# Patient Record
Sex: FEMALE | Race: BLACK OR AFRICAN AMERICAN | NOT HISPANIC OR LATINO | Employment: OTHER | URBAN - METROPOLITAN AREA
[De-identification: names, ages, dates, MRNs, and addresses within clinical notes are randomized per-mention and may not be internally consistent; named-entity substitution may affect disease eponyms.]

---

## 2023-03-14 PROBLEM — I73.9 PAD (PERIPHERAL ARTERY DISEASE) (CMS-HCC): Status: ACTIVE | Noted: 2023-03-14

## 2023-03-14 PROBLEM — M54.50 LOWER BACK PAIN: Status: ACTIVE | Noted: 2023-03-14

## 2023-03-14 PROBLEM — N18.6 ESRD (END STAGE RENAL DISEASE) ON DIALYSIS (MULTI): Status: ACTIVE | Noted: 2023-03-14

## 2023-03-14 PROBLEM — E87.5 HYPERKALEMIA: Status: ACTIVE | Noted: 2023-03-14

## 2023-03-14 PROBLEM — E16.2 HYPOGLYCEMIA: Status: ACTIVE | Noted: 2023-03-14

## 2023-03-14 PROBLEM — R09.02 HYPOXIA: Status: ACTIVE | Noted: 2023-03-14

## 2023-03-14 PROBLEM — E87.1 HYPONATREMIA: Status: ACTIVE | Noted: 2023-03-14

## 2023-03-14 PROBLEM — I20.9 ANGINA PECTORIS (CMS-HCC): Status: ACTIVE | Noted: 2023-03-14

## 2023-03-14 PROBLEM — R63.4 WEIGHT LOSS: Status: ACTIVE | Noted: 2023-03-14

## 2023-03-14 PROBLEM — I10 HTN (HYPERTENSION): Status: ACTIVE | Noted: 2023-03-14

## 2023-03-14 PROBLEM — K57.90 DIVERTICULOSIS: Status: ACTIVE | Noted: 2023-03-14

## 2023-03-14 PROBLEM — R91.8 PULMONARY INFILTRATES ON CXR: Status: ACTIVE | Noted: 2023-03-14

## 2023-03-14 PROBLEM — I50.9: Status: ACTIVE | Noted: 2023-03-14

## 2023-03-14 PROBLEM — J44.9 COPD (CHRONIC OBSTRUCTIVE PULMONARY DISEASE) (MULTI): Status: ACTIVE | Noted: 2023-03-14

## 2023-03-14 PROBLEM — E87.70 VOLUME OVERLOAD: Status: ACTIVE | Noted: 2023-03-14

## 2023-03-14 PROBLEM — J18.9 PNA (PNEUMONIA): Status: ACTIVE | Noted: 2023-03-14

## 2023-03-14 PROBLEM — K22.10 EROSIVE ESOPHAGITIS: Status: ACTIVE | Noted: 2023-03-14

## 2023-03-14 PROBLEM — I42.9 CARDIOMYOPATHY (MULTI): Status: ACTIVE | Noted: 2023-03-14

## 2023-03-14 PROBLEM — N18.5 CHRONIC KIDNEY DISEASE, STAGE 5 (MULTI): Status: ACTIVE | Noted: 2023-03-14

## 2023-03-14 PROBLEM — Z99.2 ESRD (END STAGE RENAL DISEASE) ON DIALYSIS (MULTI): Status: ACTIVE | Noted: 2023-03-14

## 2023-03-14 PROBLEM — K29.00 ACUTE GASTRITIS: Status: ACTIVE | Noted: 2023-03-14

## 2023-03-14 PROBLEM — R53.81 PHYSICAL DEBILITY: Status: ACTIVE | Noted: 2023-03-14

## 2023-03-14 PROBLEM — E78.5 HYPERLIPIDEMIA: Status: ACTIVE | Noted: 2023-03-14

## 2023-03-14 PROBLEM — R63.0 POOR APPETITE: Status: ACTIVE | Noted: 2023-03-14

## 2023-03-14 PROBLEM — K59.09 CHRONIC CONSTIPATION: Status: ACTIVE | Noted: 2023-03-14

## 2023-03-14 PROBLEM — R06.02 SOB (SHORTNESS OF BREATH) ON EXERTION: Status: ACTIVE | Noted: 2023-03-14

## 2023-03-14 PROBLEM — E83.51 HYPOCALCEMIA: Status: ACTIVE | Noted: 2023-03-14

## 2023-03-14 PROBLEM — E83.42 HYPOMAGNESEMIA: Status: ACTIVE | Noted: 2023-03-14

## 2023-03-14 PROBLEM — R10.2 PELVIC PAIN IN FEMALE: Status: ACTIVE | Noted: 2023-03-14

## 2023-03-14 PROBLEM — M11.239 PSEUDOGOUT OF WRIST: Status: ACTIVE | Noted: 2023-03-14

## 2023-03-14 PROBLEM — R53.1 WEAKNESS: Status: ACTIVE | Noted: 2023-03-14

## 2023-03-14 RX ORDER — LANOLIN ALCOHOL/MO/W.PET/CERES
1 CREAM (GRAM) TOPICAL DAILY
COMMUNITY
Start: 2022-06-26

## 2023-03-14 RX ORDER — DOCUSATE SODIUM 100 MG/1
100 CAPSULE, LIQUID FILLED ORAL 2 TIMES DAILY
COMMUNITY
End: 2024-01-31

## 2023-03-14 RX ORDER — CARVEDILOL 12.5 MG/1
12.5 TABLET ORAL 2 TIMES DAILY
COMMUNITY
End: 2023-05-02 | Stop reason: SDUPTHER

## 2023-03-14 RX ORDER — ISOSORBIDE DINITRATE 30 MG/1
1 TABLET ORAL DAILY
COMMUNITY
Start: 2022-06-26 | End: 2023-07-03 | Stop reason: WASHOUT

## 2023-03-14 RX ORDER — IPRATROPIUM BROMIDE AND ALBUTEROL SULFATE 2.5; .5 MG/3ML; MG/3ML
3 SOLUTION RESPIRATORY (INHALATION) EVERY 4 HOURS PRN
Status: ON HOLD | COMMUNITY
End: 2024-01-31 | Stop reason: ENTERED-IN-ERROR

## 2023-03-14 RX ORDER — ASPIRIN 81 MG/1
81 TABLET ORAL DAILY
COMMUNITY

## 2023-03-14 RX ORDER — ISOSORBIDE MONONITRATE 30 MG/1
60 TABLET, EXTENDED RELEASE ORAL DAILY
COMMUNITY
End: 2023-07-03 | Stop reason: SDUPTHER

## 2023-03-14 RX ORDER — MIRTAZAPINE 7.5 MG/1
1 TABLET, FILM COATED ORAL NIGHTLY
COMMUNITY
Start: 2022-09-12 | End: 2023-05-24 | Stop reason: SDUPTHER

## 2023-03-14 RX ORDER — TALC
1 POWDER (GRAM) TOPICAL NIGHTLY PRN
COMMUNITY

## 2023-03-14 RX ORDER — SEVELAMER HYDROCHLORIDE 800 MG/1
800 TABLET, FILM COATED ORAL
COMMUNITY
End: 2023-08-15 | Stop reason: SDUPTHER

## 2023-03-14 RX ORDER — ATORVASTATIN CALCIUM 80 MG/1
1 TABLET, FILM COATED ORAL DAILY
COMMUNITY
Start: 2022-06-26 | End: 2023-04-03 | Stop reason: SDUPTHER

## 2023-03-14 RX ORDER — ACETAMINOPHEN 325 MG/1
1-2 TABLET ORAL EVERY 4 HOURS PRN
COMMUNITY
Start: 2022-06-26 | End: 2024-01-31

## 2023-03-14 RX ORDER — FUROSEMIDE 80 MG/1
1 TABLET ORAL 2 TIMES DAILY
COMMUNITY
Start: 2022-06-26 | End: 2023-07-03 | Stop reason: WASHOUT

## 2023-03-14 RX ORDER — AMLODIPINE BESYLATE 5 MG/1
5 TABLET ORAL DAILY
COMMUNITY
End: 2023-05-02 | Stop reason: SDUPTHER

## 2023-03-14 RX ORDER — GABAPENTIN 100 MG/1
1 CAPSULE ORAL NIGHTLY
COMMUNITY
Start: 2022-06-26 | End: 2023-05-02 | Stop reason: SDUPTHER

## 2023-03-16 ENCOUNTER — APPOINTMENT (OUTPATIENT)
Dept: PRIMARY CARE | Facility: CLINIC | Age: 83
End: 2023-03-16
Payer: COMMERCIAL

## 2023-04-03 ENCOUNTER — OFFICE VISIT (OUTPATIENT)
Dept: PRIMARY CARE | Facility: CLINIC | Age: 83
End: 2023-04-03
Payer: COMMERCIAL

## 2023-04-03 VITALS
SYSTOLIC BLOOD PRESSURE: 120 MMHG | BODY MASS INDEX: 17.79 KG/M2 | DIASTOLIC BLOOD PRESSURE: 70 MMHG | WEIGHT: 110.23 LBS | TEMPERATURE: 98 F | HEART RATE: 72 BPM | RESPIRATION RATE: 16 BRPM

## 2023-04-03 DIAGNOSIS — I50.9 CHF, STAGE C (MULTI): ICD-10-CM

## 2023-04-03 DIAGNOSIS — I73.9 PAD (PERIPHERAL ARTERY DISEASE) (CMS-HCC): ICD-10-CM

## 2023-04-03 DIAGNOSIS — M11.231 PSEUDOGOUT OF RIGHT WRIST: ICD-10-CM

## 2023-04-03 DIAGNOSIS — I10 PRIMARY HYPERTENSION: ICD-10-CM

## 2023-04-03 DIAGNOSIS — J44.9 CHRONIC OBSTRUCTIVE PULMONARY DISEASE, UNSPECIFIED COPD TYPE (MULTI): Primary | ICD-10-CM

## 2023-04-03 DIAGNOSIS — N18.6 ANEMIA DUE TO CHRONIC KIDNEY DISEASE, ON CHRONIC DIALYSIS (MULTI): ICD-10-CM

## 2023-04-03 DIAGNOSIS — N18.6 ESRD (END STAGE RENAL DISEASE) ON DIALYSIS (MULTI): ICD-10-CM

## 2023-04-03 DIAGNOSIS — Z99.2 ANEMIA DUE TO CHRONIC KIDNEY DISEASE, ON CHRONIC DIALYSIS (MULTI): ICD-10-CM

## 2023-04-03 DIAGNOSIS — E78.49 OTHER HYPERLIPIDEMIA: ICD-10-CM

## 2023-04-03 DIAGNOSIS — D63.1 ANEMIA DUE TO CHRONIC KIDNEY DISEASE, ON CHRONIC DIALYSIS (MULTI): ICD-10-CM

## 2023-04-03 DIAGNOSIS — Z99.2 ESRD (END STAGE RENAL DISEASE) ON DIALYSIS (MULTI): ICD-10-CM

## 2023-04-03 PROCEDURE — 1157F ADVNC CARE PLAN IN RCRD: CPT | Performed by: INTERNAL MEDICINE

## 2023-04-03 PROCEDURE — 1159F MED LIST DOCD IN RCRD: CPT | Performed by: INTERNAL MEDICINE

## 2023-04-03 PROCEDURE — 3078F DIAST BP <80 MM HG: CPT | Performed by: INTERNAL MEDICINE

## 2023-04-03 PROCEDURE — 1160F RVW MEDS BY RX/DR IN RCRD: CPT | Performed by: INTERNAL MEDICINE

## 2023-04-03 PROCEDURE — 1036F TOBACCO NON-USER: CPT | Performed by: INTERNAL MEDICINE

## 2023-04-03 PROCEDURE — 99495 TRANSJ CARE MGMT MOD F2F 14D: CPT | Performed by: INTERNAL MEDICINE

## 2023-04-03 PROCEDURE — 3074F SYST BP LT 130 MM HG: CPT | Performed by: INTERNAL MEDICINE

## 2023-04-03 RX ORDER — ATORVASTATIN CALCIUM 80 MG/1
80 TABLET, FILM COATED ORAL DAILY
Qty: 30 TABLET | Refills: 2 | Status: SHIPPED | OUTPATIENT
Start: 2023-04-03 | End: 2024-02-13 | Stop reason: SDUPTHER

## 2023-04-03 NOTE — PROGRESS NOTES
Alyce Dougherty is a 83 y.o. female   Patient with a past medical history of HTN,  HLD, ESRD on hemodialysis (T, Th, Sat), HFrEF,  PAD, GERD,  Anemia of Chronic Disease, history of Breast CA status postlumpectomy s/p displaced fractures of left superior and inferior pubic rami after a fall, Pseudogout, Osteoarthritis    Admit 3/19  Discharged 3/23  Was admitted with LGI Bleed  Received 3 units PRBC  Colonoscopy done was negative    Feels fine  No chest pain/  SOB/ dizziness  BM OK  Energy level ok  Appetite OK             Review of Systems     Constitutional: no fever, no chills, not feeling poorly, not feeling tired and no recent weight gain, no recent weight loss.   ENT: no earache, no hearing loss, no nosebleeds, no nasal discharge, no sore throat and no hoarseness.   Cardiovascular: the heart rate was not slow, the heart rate was not fast, no chest pain, no palpitations, no intermittent leg claudication and no lower extremity edema.   Respiratory: no cough, wheezing or shortness of breath at rest or exertion  Gastrointestinal: no abdominal pain, no constipation, no melena, no nausea, no diarrhea, no vomiting and no blood in stools.   Musculoskeletal: no arthralgias, no myalgias, no back pain, no joint swelling, no joint stiffness, no limb pain and no limb swelling.   Integumentary: no skin rashes, no skin lesions, no itching, no skin wound and no dry skin.   Neurological: no headache, no confusion, no numbness, no dizziness, no tingling and no fainting.   All other systems have been reviewed and are negative for complaint.       Vitals:    04/03/23 1147   BP: 120/70   Pulse: 72   Resp: 16   Temp: 36.7 °C (98 °F)        Physical Exam     Constitutional   General appearance: Alert and in no acute distress.     Pulmonary   Respiratory assessment: No respiratory distress, normal respiratory rhythm and effort.    Auscultation of Lungs: Clear bilateral breath sounds.   Cardiovascular   Auscultation of heart: Apical  pulse normal, heart rate and rhythm normal, normal S1 and S2, no murmurs and no pericardial rub.    Exam for edema: 1 +  edema.   Abdomen   Abdominal Exam: No bruits, normal bowel sounds, soft, non-tender, no abdominal mass palpated.    Liver and Spleen exam: No hepato-splenomegaly.   Musculoskeletal   Examination of gait: Normal.    Inspection of digits and nails: No clubbing or cyanosis of the fingernails.    Inspection/palpation of joints, bones and muscles: No joint swelling. Normal movement of all extremities.   Skin   Skin inspection: Normal skin color and pigmentation, normal skin turgor and no visible rash.   Neurologic   Cranial nerves: Nerves 2-12 were intact, no focal neuro defects.     Assessment/Plan          Patient with a past medical history of HTN,  HLD, ESRD on hemodialysis (T, Th, Sat), HFrEF,  PAD, GERD,  Anemia of Chronic Disease, history of Breast CA status postlumpectomy s/p displaced fractures of left superior and inferior pubic rami after a fall, Pseudogout, Osteoarthritis    # LGI Bleed  Normal colonoscopy  Likely diverticular  Check CBC    # HTN  Stable  Continue current medications    # HLD  Stable  Continue current medications    # ESRD  On HD    # HFeEF  # Pedal edema  stable

## 2023-05-02 ENCOUNTER — TELEPHONE (OUTPATIENT)
Dept: PRIMARY CARE | Facility: CLINIC | Age: 83
End: 2023-05-02
Payer: COMMERCIAL

## 2023-05-02 DIAGNOSIS — I10 PRIMARY HYPERTENSION: Primary | ICD-10-CM

## 2023-05-02 DIAGNOSIS — G89.29 CHRONIC LOW BACK PAIN WITH SCIATICA, SCIATICA LATERALITY UNSPECIFIED, UNSPECIFIED BACK PAIN LATERALITY: ICD-10-CM

## 2023-05-02 DIAGNOSIS — M54.40 CHRONIC LOW BACK PAIN WITH SCIATICA, SCIATICA LATERALITY UNSPECIFIED, UNSPECIFIED BACK PAIN LATERALITY: ICD-10-CM

## 2023-05-02 RX ORDER — FUROSEMIDE 40 MG/1
40 TABLET ORAL DAILY
Qty: 60 TABLET | Refills: 2 | Status: SHIPPED | OUTPATIENT
Start: 2023-05-02 | End: 2023-07-03 | Stop reason: SDUPTHER

## 2023-05-02 RX ORDER — FUROSEMIDE 40 MG/1
TABLET ORAL 2 TIMES DAILY
COMMUNITY
Start: 2021-12-23 | End: 2023-05-02 | Stop reason: SDUPTHER

## 2023-05-02 RX ORDER — CARVEDILOL 12.5 MG/1
12.5 TABLET ORAL 2 TIMES DAILY
Qty: 120 TABLET | Refills: 2 | Status: SHIPPED | OUTPATIENT
Start: 2023-05-02 | End: 2023-06-27 | Stop reason: SDUPTHER

## 2023-05-02 RX ORDER — AMLODIPINE BESYLATE 5 MG/1
5 TABLET ORAL DAILY
Qty: 60 TABLET | Refills: 2 | Status: SHIPPED | OUTPATIENT
Start: 2023-05-02 | End: 2023-08-15 | Stop reason: SDUPTHER

## 2023-05-02 RX ORDER — GABAPENTIN 100 MG/1
100 CAPSULE ORAL NIGHTLY
Qty: 60 CAPSULE | Refills: 2 | Status: SHIPPED | OUTPATIENT
Start: 2023-05-02 | End: 2023-11-17 | Stop reason: SDUPTHER

## 2023-05-02 NOTE — TELEPHONE ENCOUNTER
Rx Refill Request Telephone Encounter    Name:  Alyce Dougherty  :  [id number]  Medication Name: Gabapentin 100MG Furosemide 40MG Carvedilol 12.5MG Amlodpine 5MG  Specific Pharmacy location:  [facility]   Date of last appointment:  4/3/2023  Date of next appointment:  2023

## 2023-05-02 NOTE — TELEPHONE ENCOUNTER
Patient son walked into office to state patient needs refills on these medications:    Gabapentin 100MG  Furosemide 40MG  Carvedilol 12.5MG  Amlodpine 5MG    [facility] [phone]

## 2023-05-11 ENCOUNTER — TELEPHONE (OUTPATIENT)
Dept: PRIMARY CARE | Facility: CLINIC | Age: 83
End: 2023-05-11
Payer: COMMERCIAL

## 2023-05-11 NOTE — TELEPHONE ENCOUNTER
Macy from visiting nurse wants to know if you received paper work for Dr. Corado to sign.   # [phone]

## 2023-05-18 ENCOUNTER — PATIENT OUTREACH (OUTPATIENT)
Dept: CARE COORDINATION | Facility: CLINIC | Age: 83
End: 2023-05-18
Payer: COMMERCIAL

## 2023-05-18 NOTE — PROGRESS NOTES
Discharge Facility:[facility]  Discharge Diagnosis: Hyperkalemia  Admission Date:23  Discharge Date: 23    PCP Appointment Date:  Specialist Appointment Date: Follow up with GI Appt will be made by Patient   Hospital Encounter and Summary: Linked   See discharge assessment below for further details  Engagement  Call Start Time: 0120 (2023  1:23 PM)    Medications  Medications reviewed with patient/caregiver?: Yes (no new meds) (2023  1:23 PM)  Is the patient having any side effects they believe may be caused by any medication additions or changes?: No (2023  1:23 PM)  Does the patient have all medications ordered at discharge?: Not applicable (2023  1:23 PM)  Is the patient taking all medications as directed (includes completed medication regime)?: Yes (2023  1:23 PM)    Appointments  Does the patient have a primary care provider?: Yes (2023  1:23 PM)  Care Management Interventions: Advised patient to make appointment (2023  1:23 PM)    Self Management  Has home health visited the patient within 72 hours of discharge?: Not applicable (2023  1:23 PM)  Has all Durable Medical Equipment (DME) been delivered?: No (2023  1:23 PM)    Patient Teaching  Does the patient have access to their discharge instructions?: No (2023  1:23 PM)  What is the patient's perception of their health status since discharge?: Improving (patient stated that she on ly had a few min to talk she was onher way to a [employer] .) (2023  1:23 PM)    Wrap Up  Call End Time: 0130 (2023  1:23 PM)

## 2023-05-24 DIAGNOSIS — R63.0 POOR APPETITE: ICD-10-CM

## 2023-05-24 RX ORDER — MIRTAZAPINE 7.5 MG/1
7.5 TABLET, FILM COATED ORAL NIGHTLY
Qty: 90 TABLET | Refills: 0 | Status: SHIPPED | OUTPATIENT
Start: 2023-05-24 | End: 2023-07-03 | Stop reason: SDUPTHER

## 2023-05-24 NOTE — TELEPHONE ENCOUNTER
A nurse from her nursing facility said she is only going to have physical therapy one time this week due to conflict with her other appointments.

## 2023-06-06 ENCOUNTER — PATIENT OUTREACH (OUTPATIENT)
Dept: CARE COORDINATION | Facility: CLINIC | Age: 83
End: 2023-06-06
Payer: COMMERCIAL

## 2023-06-06 NOTE — PROGRESS NOTES
Unable to reach patient for call back after patient's follow up appointment with PCP.  Patient had a no show for her follow up on 06/05/23.  [facility] with call back number for patient to call if needed   If no voicemail available call attempts x 2 were made to contact the patient to assist with any questions or concerns patient may have.

## 2023-06-12 ENCOUNTER — PATIENT OUTREACH (OUTPATIENT)
Dept: CARE COORDINATION | Facility: CLINIC | Age: 83
End: 2023-06-12
Payer: COMMERCIAL

## 2023-06-12 NOTE — PROGRESS NOTES
Discharge Facility:[facility]  Discharge Diagnosis:GI Bleed  Admission Date:06/07/23  Discharge Date: 06/09/23    PCP Appointment Date:  Specialist Appointment Date:   Hospital Encounter and Summary: Linked   See discharge assessment below for further details

## 2023-06-23 ENCOUNTER — TELEPHONE (OUTPATIENT)
Dept: PRIMARY CARE | Facility: CLINIC | Age: 83
End: 2023-06-23
Payer: COMMERCIAL

## 2023-06-27 DIAGNOSIS — I10 PRIMARY HYPERTENSION: ICD-10-CM

## 2023-06-27 RX ORDER — CARVEDILOL 12.5 MG/1
12.5 TABLET ORAL 2 TIMES DAILY
Qty: 120 TABLET | Refills: 2 | Status: SHIPPED | OUTPATIENT
Start: 2023-06-27 | End: 2024-02-05 | Stop reason: HOSPADM

## 2023-07-03 ENCOUNTER — OFFICE VISIT (OUTPATIENT)
Dept: PRIMARY CARE | Facility: CLINIC | Age: 83
End: 2023-07-03
Payer: COMMERCIAL

## 2023-07-03 VITALS
DIASTOLIC BLOOD PRESSURE: 62 MMHG | SYSTOLIC BLOOD PRESSURE: 140 MMHG | HEART RATE: 62 BPM | WEIGHT: 116 LBS | OXYGEN SATURATION: 96 % | RESPIRATION RATE: 16 BRPM | BODY MASS INDEX: 19.3 KG/M2

## 2023-07-03 DIAGNOSIS — K62.5 RECTAL BLEED: Primary | ICD-10-CM

## 2023-07-03 DIAGNOSIS — I73.9 PAD (PERIPHERAL ARTERY DISEASE) (CMS-HCC): ICD-10-CM

## 2023-07-03 DIAGNOSIS — R63.0 POOR APPETITE: ICD-10-CM

## 2023-07-03 DIAGNOSIS — I10 PRIMARY HYPERTENSION: ICD-10-CM

## 2023-07-03 DIAGNOSIS — I50.9 CHF, STAGE C (MULTI): ICD-10-CM

## 2023-07-03 PROCEDURE — 3078F DIAST BP <80 MM HG: CPT

## 2023-07-03 PROCEDURE — 1036F TOBACCO NON-USER: CPT

## 2023-07-03 PROCEDURE — 1160F RVW MEDS BY RX/DR IN RCRD: CPT

## 2023-07-03 PROCEDURE — 3077F SYST BP >= 140 MM HG: CPT

## 2023-07-03 PROCEDURE — 1157F ADVNC CARE PLAN IN RCRD: CPT

## 2023-07-03 PROCEDURE — 1159F MED LIST DOCD IN RCRD: CPT

## 2023-07-03 PROCEDURE — 99214 OFFICE O/P EST MOD 30 MIN: CPT

## 2023-07-03 RX ORDER — FUROSEMIDE 40 MG/1
40 TABLET ORAL 2 TIMES DAILY
Qty: 60 TABLET | Refills: 0 | Status: SHIPPED | OUTPATIENT
Start: 2023-07-03 | End: 2023-08-03 | Stop reason: SDUPTHER

## 2023-07-03 RX ORDER — PANTOPRAZOLE SODIUM 40 MG/1
40 TABLET, DELAYED RELEASE ORAL
COMMUNITY
Start: 2021-03-09 | End: 2024-02-04 | Stop reason: ENTERED-IN-ERROR

## 2023-07-03 RX ORDER — MIRTAZAPINE 7.5 MG/1
7.5 TABLET, FILM COATED ORAL NIGHTLY
Qty: 90 TABLET | Refills: 0 | Status: SHIPPED | OUTPATIENT
Start: 2023-07-03 | End: 2023-12-13 | Stop reason: SDUPTHER

## 2023-07-03 RX ORDER — ISOSORBIDE MONONITRATE 30 MG/1
60 TABLET, EXTENDED RELEASE ORAL DAILY
Qty: 30 TABLET | Refills: 0 | Status: SHIPPED | OUTPATIENT
Start: 2023-07-03 | End: 2024-02-13 | Stop reason: SDUPTHER

## 2023-07-03 ASSESSMENT — ENCOUNTER SYMPTOMS
POLYPHAGIA: 0
DYSURIA: 0
PALPITATIONS: 0
CONSTIPATION: 0
CONFUSION: 0
SLEEP DISTURBANCE: 0
EYE DISCHARGE: 0
BRUISES/BLEEDS EASILY: 0
MYALGIAS: 0
HEADACHES: 0
UNEXPECTED WEIGHT CHANGE: 0
FREQUENCY: 0
RESPIRATORY NEGATIVE: 1
COUGH: 0
ACTIVITY CHANGE: 0
NUMBNESS: 0
DIAPHORESIS: 0
PSYCHIATRIC NEGATIVE: 1
EYES NEGATIVE: 1
HYPERACTIVE: 0
APPETITE CHANGE: 0
FLANK PAIN: 0
TROUBLE SWALLOWING: 0
BACK PAIN: 0
FATIGUE: 0
VOICE CHANGE: 0
CHEST TIGHTNESS: 0
WHEEZING: 0
NAUSEA: 0
STRIDOR: 0
BLOOD IN STOOL: 0
SORE THROAT: 0
SHORTNESS OF BREATH: 0
DIARRHEA: 0
RECTAL PAIN: 0
LIGHT-HEADEDNESS: 0
DYSPHORIC MOOD: 0
NEUROLOGICAL NEGATIVE: 1
NECK PAIN: 0
ABDOMINAL DISTENTION: 0
CHILLS: 0
ANAL BLEEDING: 0
DIZZINESS: 0
TREMORS: 0
SPEECH DIFFICULTY: 0
SEIZURES: 0
HEMATURIA: 0
PHOTOPHOBIA: 0
NERVOUS/ANXIOUS: 0
RHINORRHEA: 0
GASTROINTESTINAL NEGATIVE: 1
APNEA: 0
COLOR CHANGE: 0
DIFFICULTY URINATING: 0
WEAKNESS: 0
CONSTITUTIONAL NEGATIVE: 1
CARDIOVASCULAR NEGATIVE: 1
ABDOMINAL PAIN: 0
JOINT SWELLING: 0
SINUS PRESSURE: 0
MUSCULOSKELETAL NEGATIVE: 1
AGITATION: 0
ENDOCRINE NEGATIVE: 1
HEMATOLOGIC/LYMPHATIC NEGATIVE: 1
NECK STIFFNESS: 0
FEVER: 0
POLYDIPSIA: 0

## 2023-07-03 NOTE — PROGRESS NOTES
Primary Care Provider: Chanelle Corado MD    Subjective   Alyce Dougherty is a 83 y.o. female who presents for Follow-up (Hospital discharge.).    Patient with a past medical history of HTN, HLD, ESRD on hemodialysis (T, Th, Sat), HFrEF, PAD, GERD, Anemia of Chronic Disease, history of Breast CA status postlumpectomy s/p displaced fractures of left superior and inferior pubic rami after a fall, Pseudogout, Osteoarthritis    First week of June hospitalized with Acute blood loss anemia. GI bleed; diverticular likely. Received a few units of blood.  Planned for outpatient GI follow-up.  She has an appointment 7/7 at 10am.    Feels fine  No chest pain/  SOB/ dizziness  BM OK  Energy level ok  Appetite OK           Review of Systems   Constitutional: Negative.  Negative for activity change, appetite change, chills, diaphoresis, fatigue, fever and unexpected weight change.   HENT: Negative.  Negative for congestion, dental problem, ear discharge, ear pain, hearing loss, mouth sores, nosebleeds, postnasal drip, rhinorrhea, sinus pressure, sneezing, sore throat, tinnitus, trouble swallowing and voice change.    Eyes: Negative.  Negative for photophobia, discharge and visual disturbance.   Respiratory: Negative.  Negative for apnea, cough, chest tightness, shortness of breath, wheezing and stridor.    Cardiovascular: Negative.  Negative for chest pain, palpitations and leg swelling.   Gastrointestinal: Negative.  Negative for abdominal distention, abdominal pain, anal bleeding, blood in stool, constipation, diarrhea, nausea and rectal pain.   Endocrine: Negative.  Negative for cold intolerance, heat intolerance, polydipsia, polyphagia and polyuria.   Genitourinary: Negative.  Negative for decreased urine volume, difficulty urinating, dysuria, flank pain, frequency, hematuria and urgency.   Musculoskeletal: Negative.  Negative for back pain, gait problem, joint swelling, myalgias, neck pain and neck stiffness.   Skin:  Negative.  Negative for color change and rash.   Neurological: Negative.  Negative for dizziness, tremors, seizures, syncope, speech difficulty, weakness, light-headedness, numbness and headaches.   Hematological: Negative.  Does not bruise/bleed easily.   Psychiatric/Behavioral: Negative.  Negative for agitation, confusion, dysphoric mood, sleep disturbance and suicidal ideas. The patient is not nervous/anxious and is not hyperactive.    All other systems reviewed and are negative.        Objective   /62   Pulse 62   Resp 16   Wt 52.6 kg (116 lb)   SpO2 96%   BMI 19.30 kg/m²     Physical Exam  Vitals reviewed.   Constitutional:       General: She is not in acute distress.     Appearance: Normal appearance. She is normal weight. She is not ill-appearing, toxic-appearing or diaphoretic.   HENT:      Head: Normocephalic and atraumatic.      Nose: Nose normal.   Eyes:      Extraocular Movements: Extraocular movements intact.      Conjunctiva/sclera: Conjunctivae normal.      Pupils: Pupils are equal, round, and reactive to light.   Neck:      Vascular: No carotid bruit.   Cardiovascular:      Rate and Rhythm: Normal rate and regular rhythm.      Pulses: Normal pulses.      Heart sounds: Normal heart sounds. No murmur heard.     No friction rub. No gallop.   Pulmonary:      Effort: Pulmonary effort is normal. No respiratory distress.      Breath sounds: Normal breath sounds.   Abdominal:      General: Abdomen is flat. Bowel sounds are normal.      Palpations: Abdomen is soft.   Musculoskeletal:         General: Normal range of motion.      Cervical back: Normal range of motion and neck supple.   Lymphadenopathy:      Cervical: No cervical adenopathy.   Skin:     General: Skin is warm and dry.      Capillary Refill: Capillary refill takes less than 2 seconds.   Neurological:      General: No focal deficit present.      Mental Status: She is alert and oriented to person, place, and time. Mental status is at  baseline.   Psychiatric:         Mood and Affect: Mood normal.         Behavior: Behavior normal.         Thought Content: Thought content normal.         Judgment: Judgment normal.         Assessment/Plan   Problem List Items Addressed This Visit       CHF, stage C (CMS/HCC)    Relevant Medications    isosorbide mononitrate ER (Imdur) 30 mg 24 hr tablet    furosemide (Lasix) 40 mg tablet    Other Relevant Orders    CBC    Comprehensive metabolic panel    HTN (hypertension)    Relevant Medications    furosemide (Lasix) 40 mg tablet    Other Relevant Orders    CBC    Comprehensive metabolic panel    PAD (peripheral artery disease) (CMS/HCC)    Relevant Orders    Referral to Vascular Medicine    Poor appetite    Relevant Medications    mirtazapine (Remeron) 7.5 mg tablet     Other Visit Diagnoses       Rectal bleed    -  Primary    Relevant Orders    CBC    Comprehensive metabolic panel        Follow up in 3 months or sooner as needed

## 2023-07-10 ENCOUNTER — LAB (OUTPATIENT)
Dept: LAB | Facility: LAB | Age: 83
End: 2023-07-10
Payer: COMMERCIAL

## 2023-07-10 DIAGNOSIS — I10 PRIMARY HYPERTENSION: ICD-10-CM

## 2023-07-10 DIAGNOSIS — I50.9 CHF, STAGE C (MULTI): ICD-10-CM

## 2023-07-10 DIAGNOSIS — K62.5 RECTAL BLEED: ICD-10-CM

## 2023-07-10 LAB
ERYTHROCYTE DISTRIBUTION WIDTH (RATIO) BY AUTOMATED COUNT: 17.5 % (ref 11.5–14.5)
ERYTHROCYTE MEAN CORPUSCULAR HEMOGLOBIN CONCENTRATION (G/DL) BY AUTOMATED: 30 G/DL (ref 32–36)
ERYTHROCYTE MEAN CORPUSCULAR VOLUME (FL) BY AUTOMATED COUNT: 98 FL (ref 80–100)
ERYTHROCYTES (10*6/UL) IN BLOOD BY AUTOMATED COUNT: 3.35 X10E12/L (ref 4–5.2)
HEMATOCRIT (%) IN BLOOD BY AUTOMATED COUNT: 32.7 % (ref 36–46)
HEMOGLOBIN (G/DL) IN BLOOD: 9.8 G/DL (ref 12–16)
LEUKOCYTES (10*3/UL) IN BLOOD BY AUTOMATED COUNT: 3.8 X10E9/L (ref 4.4–11.3)
PLATELETS (10*3/UL) IN BLOOD AUTOMATED COUNT: 133 X10E9/L (ref 150–450)

## 2023-07-10 PROCEDURE — 85027 COMPLETE CBC AUTOMATED: CPT

## 2023-07-10 PROCEDURE — 36415 COLL VENOUS BLD VENIPUNCTURE: CPT

## 2023-07-10 PROCEDURE — 80053 COMPREHEN METABOLIC PANEL: CPT

## 2023-07-11 LAB
ALANINE AMINOTRANSFERASE (SGPT) (U/L) IN SER/PLAS: 21 U/L (ref 7–45)
ALBUMIN (G/DL) IN SER/PLAS: 3.5 G/DL (ref 3.4–5)
ALKALINE PHOSPHATASE (U/L) IN SER/PLAS: 232 U/L (ref 33–136)
ANION GAP IN SER/PLAS: 17 MMOL/L (ref 10–20)
ASPARTATE AMINOTRANSFERASE (SGOT) (U/L) IN SER/PLAS: 26 U/L (ref 9–39)
BILIRUBIN TOTAL (MG/DL) IN SER/PLAS: 0.5 MG/DL (ref 0–1.2)
CALCIUM (MG/DL) IN SER/PLAS: 9.6 MG/DL (ref 8.6–10.6)
CARBON DIOXIDE, TOTAL (MMOL/L) IN SER/PLAS: 26 MMOL/L (ref 21–32)
CHLORIDE (MMOL/L) IN SER/PLAS: 98 MMOL/L (ref 98–107)
CREATININE (MG/DL) IN SER/PLAS: 6.4 MG/DL (ref 0.5–1.05)
GFR FEMALE: 6 ML/MIN/1.73M2
GLUCOSE (MG/DL) IN SER/PLAS: 109 MG/DL (ref 74–99)
POTASSIUM (MMOL/L) IN SER/PLAS: 5.1 MMOL/L (ref 3.5–5.3)
PROTEIN TOTAL: 6.8 G/DL (ref 6.4–8.2)
SODIUM (MMOL/L) IN SER/PLAS: 136 MMOL/L (ref 136–145)
UREA NITROGEN (MG/DL) IN SER/PLAS: 38 MG/DL (ref 6–23)

## 2023-08-03 DIAGNOSIS — I10 PRIMARY HYPERTENSION: ICD-10-CM

## 2023-08-03 DIAGNOSIS — I50.9 CHF, STAGE C (MULTI): ICD-10-CM

## 2023-08-04 RX ORDER — FUROSEMIDE 40 MG/1
40 TABLET ORAL 2 TIMES DAILY
Qty: 60 TABLET | Refills: 3 | Status: SHIPPED | OUTPATIENT
Start: 2023-08-04 | End: 2024-02-05 | Stop reason: HOSPADM

## 2023-08-15 DIAGNOSIS — I10 PRIMARY HYPERTENSION: ICD-10-CM

## 2023-08-15 DIAGNOSIS — Z99.2 ESRD (END STAGE RENAL DISEASE) ON DIALYSIS (MULTI): Primary | ICD-10-CM

## 2023-08-15 DIAGNOSIS — N18.6 ESRD (END STAGE RENAL DISEASE) ON DIALYSIS (MULTI): Primary | ICD-10-CM

## 2023-08-15 RX ORDER — AMLODIPINE BESYLATE 5 MG/1
5 TABLET ORAL DAILY
Qty: 60 TABLET | Refills: 2 | Status: SHIPPED | OUTPATIENT
Start: 2023-08-15 | End: 2024-02-05 | Stop reason: HOSPADM

## 2023-08-15 RX ORDER — SEVELAMER HYDROCHLORIDE 800 MG/1
800 TABLET, FILM COATED ORAL DAILY
Qty: 30 TABLET | Refills: 3 | Status: SHIPPED | OUTPATIENT
Start: 2023-08-15

## 2023-08-15 NOTE — TELEPHONE ENCOUNTER
PT son called and stated that his mother needs a script refill for different medications.    RX: Sevelamer 800 mg tablet    RX: Amlodipine 5 mg tablet

## 2023-08-22 ENCOUNTER — PATIENT OUTREACH (OUTPATIENT)
Dept: CARE COORDINATION | Facility: CLINIC | Age: 83
End: 2023-08-22
Payer: COMMERCIAL

## 2023-08-23 ENCOUNTER — TELEPHONE (OUTPATIENT)
Dept: PRIMARY CARE | Facility: CLINIC | Age: 83
End: 2023-08-23

## 2023-08-23 NOTE — TELEPHONE ENCOUNTER
PT son called and stated that she needs a new order [facility] to come.    PT needs to get more lidocaine from [facility].

## 2023-08-24 DIAGNOSIS — M54.50 CHRONIC LOW BACK PAIN, UNSPECIFIED BACK PAIN LATERALITY, UNSPECIFIED WHETHER SCIATICA PRESENT: Primary | ICD-10-CM

## 2023-08-24 DIAGNOSIS — G89.29 CHRONIC LOW BACK PAIN, UNSPECIFIED BACK PAIN LATERALITY, UNSPECIFIED WHETHER SCIATICA PRESENT: Primary | ICD-10-CM

## 2023-08-24 RX ORDER — LIDOCAINE 50 MG/G
1 PATCH TOPICAL DAILY
Qty: 30 PATCH | Refills: 11 | Status: SHIPPED | OUTPATIENT
Start: 2023-08-24 | End: 2024-02-05 | Stop reason: HOSPADM

## 2023-09-20 ENCOUNTER — PATIENT OUTREACH (OUTPATIENT)
Dept: CARE COORDINATION | Facility: CLINIC | Age: 83
End: 2023-09-20
Payer: COMMERCIAL

## 2023-09-20 NOTE — PROGRESS NOTES
Patient has met target of no readmission for 90) days post (hospital  discharge and is graduated from Transitional Care Management program at this time.

## 2023-10-06 ENCOUNTER — HOME HEALTH ADMISSION (OUTPATIENT)
Dept: HOME HEALTH SERVICES | Facility: HOME HEALTH | Age: 83
End: 2023-10-06
Payer: MEDICARE

## 2023-10-06 ENCOUNTER — OFFICE VISIT (OUTPATIENT)
Dept: PRIMARY CARE | Facility: CLINIC | Age: 83
End: 2023-10-06
Payer: COMMERCIAL

## 2023-10-06 VITALS
BODY MASS INDEX: 19.8 KG/M2 | RESPIRATION RATE: 18 BRPM | SYSTOLIC BLOOD PRESSURE: 130 MMHG | DIASTOLIC BLOOD PRESSURE: 70 MMHG | HEART RATE: 68 BPM | WEIGHT: 119 LBS

## 2023-10-06 DIAGNOSIS — E78.49 OTHER HYPERLIPIDEMIA: ICD-10-CM

## 2023-10-06 DIAGNOSIS — J44.9 CHRONIC OBSTRUCTIVE PULMONARY DISEASE, UNSPECIFIED COPD TYPE (MULTI): ICD-10-CM

## 2023-10-06 DIAGNOSIS — Z99.2 ESRD (END STAGE RENAL DISEASE) ON DIALYSIS (MULTI): ICD-10-CM

## 2023-10-06 DIAGNOSIS — Z00.00 HEALTHCARE MAINTENANCE: ICD-10-CM

## 2023-10-06 DIAGNOSIS — I10 PRIMARY HYPERTENSION: Primary | ICD-10-CM

## 2023-10-06 DIAGNOSIS — N18.6 ESRD (END STAGE RENAL DISEASE) ON DIALYSIS (MULTI): ICD-10-CM

## 2023-10-06 PROCEDURE — 1160F RVW MEDS BY RX/DR IN RCRD: CPT | Performed by: INTERNAL MEDICINE

## 2023-10-06 PROCEDURE — 90471 IMMUNIZATION ADMIN: CPT | Performed by: INTERNAL MEDICINE

## 2023-10-06 PROCEDURE — 99214 OFFICE O/P EST MOD 30 MIN: CPT | Performed by: INTERNAL MEDICINE

## 2023-10-06 PROCEDURE — 3075F SYST BP GE 130 - 139MM HG: CPT | Performed by: INTERNAL MEDICINE

## 2023-10-06 PROCEDURE — 1126F AMNT PAIN NOTED NONE PRSNT: CPT | Performed by: INTERNAL MEDICINE

## 2023-10-06 PROCEDURE — 1159F MED LIST DOCD IN RCRD: CPT | Performed by: INTERNAL MEDICINE

## 2023-10-06 PROCEDURE — 3078F DIAST BP <80 MM HG: CPT | Performed by: INTERNAL MEDICINE

## 2023-10-06 PROCEDURE — 1036F TOBACCO NON-USER: CPT | Performed by: INTERNAL MEDICINE

## 2023-10-06 PROCEDURE — 90662 IIV NO PRSV INCREASED AG IM: CPT | Performed by: INTERNAL MEDICINE

## 2023-10-06 RX ORDER — TIOTROPIUM BROMIDE 18 UG/1
1 CAPSULE ORAL; RESPIRATORY (INHALATION) NIGHTLY
Qty: 30 CAPSULE | Refills: 11 | Status: SHIPPED | OUTPATIENT
Start: 2023-10-06 | End: 2024-05-20 | Stop reason: SDUPTHER

## 2023-10-06 RX ORDER — BENZONATATE 100 MG/1
100 CAPSULE ORAL 3 TIMES DAILY PRN
Qty: 42 CAPSULE | Refills: 0 | Status: SHIPPED | OUTPATIENT
Start: 2023-10-06 | End: 2023-11-05

## 2023-10-06 NOTE — PROGRESS NOTES
Alyce Dougherty is a 83 y.o. female   Patient with a past medical history of HTN,  HLD, ESRD on hemodialysis (T, Th, Sat), HFrEF,  PAD, GERD,  Anemia of Chronic Disease, history of Breast CA status postlumpectomy s/p displaced fractures of left superior and inferior pubic rami after a fall, Pseudogout, Osteoarthritis    C/o morning phlegm  Every morning, clears up bey afternoon  No SOB  No Sinus congestion    Feels fine  No chest pain/  SOB/ dizziness  BM OK  Energy level ok  Appetite OK             Review of Systems     Constitutional: no fever, no chills, not feeling poorly, not feeling tired and no recent weight gain, no recent weight loss.   ENT: no earache, no hearing loss, no nosebleeds, no nasal discharge, no sore throat and no hoarseness.   Cardiovascular: the heart rate was not slow, the heart rate was not fast, no chest pain, no palpitations, no intermittent leg claudication and no lower extremity edema.   Respiratory: no cough, wheezing or shortness of breath at rest or exertion  Gastrointestinal: no abdominal pain, no constipation, no melena, no nausea, no diarrhea, no vomiting and no blood in stools.   Musculoskeletal: no arthralgias, no myalgias, no back pain, no joint swelling, no joint stiffness, no limb pain and no limb swelling.   Integumentary: no skin rashes, no skin lesions, no itching, no skin wound and no dry skin.   Neurological: no headache, no confusion, no numbness, no dizziness, no tingling and no fainting.   All other systems have been reviewed and are negative for complaint.       There were no vitals filed for this visit.       Physical Exam     Constitutional   General appearance: Alert and in no acute distress.     Pulmonary   Respiratory assessment: No respiratory distress, normal respiratory rhythm and effort.    Auscultation of Lungs: Clear bilateral breath sounds.   Cardiovascular   Auscultation of heart: Apical pulse normal, heart rate and rhythm normal, normal S1 and S2, no  murmurs and no pericardial rub.    Exam for edema: 1 +  edema.   Abdomen   Abdominal Exam: No bruits, normal bowel sounds, soft, non-tender, no abdominal mass palpated.    Liver and Spleen exam: No hepato-splenomegaly.   Musculoskeletal   Examination of gait: Normal.    Inspection of digits and nails: No clubbing or cyanosis of the fingernails.    Inspection/palpation of joints, bones and muscles: No joint swelling. Normal movement of all extremities.   Skin   Skin inspection: Normal skin color and pigmentation, normal skin turgor and no visible rash.   Neurologic   Cranial nerves: Nerves 2-12 were intact, no focal neuro defects.     Assessment/Plan          Patient with a past medical history of HTN,  HLD, ESRD on hemodialysis (T, Th, Sat), HFrEF,  PAD, GERD,  Anemia of Chronic Disease, history of Breast CA status postlumpectomy s/p displaced fractures of left superior and inferior pubic rami after a fall, Pseudogout, Osteoarthritis    # Chronic productive cough in AM  # COPD  Chest xray  Tessalon perles  Start Spiriva    # HTN  Stable  Continue current medications    # HLD  Stable  Continue current medications    # ESRD  On HD    # HFeEF  # Pedal edema  stable

## 2023-10-09 ENCOUNTER — HOME CARE VISIT (OUTPATIENT)
Dept: HOME HEALTH SERVICES | Facility: HOME HEALTH | Age: 83
End: 2023-10-09
Payer: COMMERCIAL

## 2023-10-09 VITALS
HEIGHT: 65 IN | WEIGHT: 101.5 LBS | SYSTOLIC BLOOD PRESSURE: 100 MMHG | BODY MASS INDEX: 16.91 KG/M2 | TEMPERATURE: 97.6 F | HEART RATE: 68 BPM | DIASTOLIC BLOOD PRESSURE: 68 MMHG

## 2023-10-09 PROCEDURE — G0299 HHS/HOSPICE OF RN EA 15 MIN: HCPCS | Mod: HHH

## 2023-10-09 PROCEDURE — 1090000003 HH PPS REVENUE ADJ

## 2023-10-09 PROCEDURE — 169592 NO-PAY CLAIM PROCEDURE

## 2023-10-09 PROCEDURE — 1090000002 HH PPS REVENUE DEBIT

## 2023-10-09 PROCEDURE — 1090000001 HH PPS REVENUE CREDIT

## 2023-10-09 PROCEDURE — 0023 HH SOC

## 2023-10-09 ASSESSMENT — PAIN SCALES - PAIN ASSESSMENT IN ADVANCED DEMENTIA (PAINAD)
BODYLANGUAGE: 0 - RELAXED.
FACIALEXPRESSION: 0 - SMILING OR INEXPRESSIVE.
BREATHING: 0
CONSOLABILITY: 0 - NO NEED TO CONSOLE.
TOTALSCORE: 0
NEGVOCALIZATION: 0
BODYLANGUAGE: 0
CONSOLABILITY: 0
NEGVOCALIZATION: 0 - NONE.
FACIALEXPRESSION: 0

## 2023-10-09 ASSESSMENT — ENCOUNTER SYMPTOMS
PAIN LOCATION: BACK
PAIN LOCATION - PAIN SEVERITY: 2/10
PAIN LOCATION - RELIEVING FACTORS: PAIN MEDS
PAIN: 1
PAIN SEVERITY GOAL: 0/10
SUBJECTIVE PAIN PROGRESSION: WAXING AND WANING
LOWEST PAIN SEVERITY IN PAST 24 HOURS: 0/10
PERSON REPORTING PAIN: PATIENT
HIGHEST PAIN SEVERITY IN PAST 24 HOURS: 4/10

## 2023-10-09 ASSESSMENT — ACTIVITIES OF DAILY LIVING (ADL): ENTERING_EXITING_HOME: ONE PERSON

## 2023-10-10 PROCEDURE — 1090000001 HH PPS REVENUE CREDIT

## 2023-10-10 PROCEDURE — 1090000002 HH PPS REVENUE DEBIT

## 2023-10-11 PROCEDURE — 1090000002 HH PPS REVENUE DEBIT

## 2023-10-11 PROCEDURE — 1090000001 HH PPS REVENUE CREDIT

## 2023-10-12 PROCEDURE — 1090000001 HH PPS REVENUE CREDIT

## 2023-10-12 PROCEDURE — 1090000002 HH PPS REVENUE DEBIT

## 2023-10-13 PROCEDURE — 1090000001 HH PPS REVENUE CREDIT

## 2023-10-13 PROCEDURE — 1090000002 HH PPS REVENUE DEBIT

## 2023-10-13 ASSESSMENT — ACTIVITIES OF DAILY LIVING (ADL)
BATHING_CURRENT_FUNCTION: CONTACT GUARD ASSIST
AMBULATION ASSISTANCE: ONE PERSON
AMBULATION ASSISTANCE: CONTACT GUARD ASSIST
BATHING ASSESSED: 1
BATHING_CURRENT_FUNCTION: ONE PERSON
AMBULATION ASSISTANCE: 1
OASIS_M1830: 05

## 2023-10-13 ASSESSMENT — ENCOUNTER SYMPTOMS
COUGH CHARACTERISTICS: NON-PRODUCTIVE
LOSS OF SENSATION IN FEET: 0
CHANGE IN APPETITE: VARYING
COUGH: 1
FATIGUES EASILY: 1
OCCASIONAL FEELINGS OF UNSTEADINESS: 0
DEPRESSION: 0
APPETITE LEVEL: FAIR
FORGETFULNESS: 1

## 2023-10-14 PROCEDURE — 1090000001 HH PPS REVENUE CREDIT

## 2023-10-14 PROCEDURE — 1090000002 HH PPS REVENUE DEBIT

## 2023-10-15 PROCEDURE — 1090000001 HH PPS REVENUE CREDIT

## 2023-10-15 PROCEDURE — 1090000002 HH PPS REVENUE DEBIT

## 2023-10-16 PROCEDURE — 1090000002 HH PPS REVENUE DEBIT

## 2023-10-16 PROCEDURE — 1090000001 HH PPS REVENUE CREDIT

## 2023-10-17 ENCOUNTER — HOSPITAL ENCOUNTER (OUTPATIENT)
Dept: RADIOLOGY | Facility: HOSPITAL | Age: 83
Discharge: HOME | End: 2023-10-17
Payer: COMMERCIAL

## 2023-10-17 DIAGNOSIS — J44.9 CHRONIC OBSTRUCTIVE PULMONARY DISEASE, UNSPECIFIED COPD TYPE (MULTI): ICD-10-CM

## 2023-10-17 PROCEDURE — 1090000001 HH PPS REVENUE CREDIT

## 2023-10-17 PROCEDURE — 1090000002 HH PPS REVENUE DEBIT

## 2023-10-17 PROCEDURE — 71046 X-RAY EXAM CHEST 2 VIEWS: CPT

## 2023-10-17 PROCEDURE — 71046 X-RAY EXAM CHEST 2 VIEWS: CPT | Performed by: RADIOLOGY

## 2023-10-18 PROCEDURE — 1090000002 HH PPS REVENUE DEBIT

## 2023-10-18 PROCEDURE — 1090000001 HH PPS REVENUE CREDIT

## 2023-10-19 PROCEDURE — 1090000002 HH PPS REVENUE DEBIT

## 2023-10-19 PROCEDURE — 1090000001 HH PPS REVENUE CREDIT

## 2023-10-20 PROCEDURE — 1090000002 HH PPS REVENUE DEBIT

## 2023-10-20 PROCEDURE — 1090000001 HH PPS REVENUE CREDIT

## 2023-10-21 PROCEDURE — 1090000001 HH PPS REVENUE CREDIT

## 2023-10-21 PROCEDURE — 1090000002 HH PPS REVENUE DEBIT

## 2023-10-22 PROCEDURE — 1090000001 HH PPS REVENUE CREDIT

## 2023-10-22 PROCEDURE — 1090000002 HH PPS REVENUE DEBIT

## 2023-10-23 PROCEDURE — 1090000001 HH PPS REVENUE CREDIT

## 2023-10-23 PROCEDURE — 1090000002 HH PPS REVENUE DEBIT

## 2023-10-24 PROCEDURE — 1090000002 HH PPS REVENUE DEBIT

## 2023-10-24 PROCEDURE — 1090000001 HH PPS REVENUE CREDIT

## 2023-10-25 PROCEDURE — 1090000002 HH PPS REVENUE DEBIT

## 2023-10-25 PROCEDURE — 1090000001 HH PPS REVENUE CREDIT

## 2023-10-26 PROCEDURE — 1090000002 HH PPS REVENUE DEBIT

## 2023-10-26 PROCEDURE — 1090000001 HH PPS REVENUE CREDIT

## 2023-10-27 PROCEDURE — 1090000002 HH PPS REVENUE DEBIT

## 2023-10-27 PROCEDURE — 1090000001 HH PPS REVENUE CREDIT

## 2023-10-28 PROCEDURE — 1090000001 HH PPS REVENUE CREDIT

## 2023-10-28 PROCEDURE — 1090000002 HH PPS REVENUE DEBIT

## 2023-10-29 PROCEDURE — 1090000002 HH PPS REVENUE DEBIT

## 2023-10-29 PROCEDURE — 1090000001 HH PPS REVENUE CREDIT

## 2023-10-30 ENCOUNTER — TELEPHONE (OUTPATIENT)
Dept: PRIMARY CARE | Facility: CLINIC | Age: 83
End: 2023-10-30
Payer: COMMERCIAL

## 2023-10-30 PROCEDURE — 1090000001 HH PPS REVENUE CREDIT

## 2023-10-30 PROCEDURE — 1090000002 HH PPS REVENUE DEBIT

## 2023-10-30 NOTE — TELEPHONE ENCOUNTER
PT son called in and stated that the PT is still waiting for her test results from her chest x ray. PT son stated that the pt still having some chest congestion and would like a CB from Dr. Cowan              PLEASE PEND TO DR COWAN

## 2023-10-31 DIAGNOSIS — J20.9 ACUTE BRONCHITIS, UNSPECIFIED ORGANISM: Primary | ICD-10-CM

## 2023-10-31 PROCEDURE — 1090000001 HH PPS REVENUE CREDIT

## 2023-10-31 PROCEDURE — 1090000002 HH PPS REVENUE DEBIT

## 2023-10-31 RX ORDER — BENZONATATE 100 MG/1
100 CAPSULE ORAL 3 TIMES DAILY PRN
Qty: 42 CAPSULE | Refills: 0 | Status: SHIPPED | OUTPATIENT
Start: 2023-10-31 | End: 2023-11-30

## 2023-10-31 RX ORDER — AZITHROMYCIN 250 MG/1
TABLET, FILM COATED ORAL
Qty: 6 TABLET | Refills: 0 | Status: SHIPPED | OUTPATIENT
Start: 2023-10-31 | End: 2023-11-05

## 2023-11-01 PROCEDURE — 1090000001 HH PPS REVENUE CREDIT

## 2023-11-01 PROCEDURE — 1090000002 HH PPS REVENUE DEBIT

## 2023-11-02 PROCEDURE — 1090000002 HH PPS REVENUE DEBIT

## 2023-11-02 PROCEDURE — 1090000001 HH PPS REVENUE CREDIT

## 2023-11-03 PROCEDURE — 1090000002 HH PPS REVENUE DEBIT

## 2023-11-03 PROCEDURE — 1090000001 HH PPS REVENUE CREDIT

## 2023-11-04 PROCEDURE — 1090000002 HH PPS REVENUE DEBIT

## 2023-11-04 PROCEDURE — 1090000001 HH PPS REVENUE CREDIT

## 2023-11-05 PROCEDURE — 1090000001 HH PPS REVENUE CREDIT

## 2023-11-05 PROCEDURE — 1090000002 HH PPS REVENUE DEBIT

## 2023-11-06 PROCEDURE — 1090000001 HH PPS REVENUE CREDIT

## 2023-11-06 PROCEDURE — 1090000002 HH PPS REVENUE DEBIT

## 2023-11-07 PROCEDURE — 1090000002 HH PPS REVENUE DEBIT

## 2023-11-07 PROCEDURE — 1090000001 HH PPS REVENUE CREDIT

## 2023-11-07 PROCEDURE — 1090000003 HH PPS REVENUE ADJ

## 2023-11-08 PROCEDURE — 0023 HH SOC

## 2023-11-08 PROCEDURE — 1090000001 HH PPS REVENUE CREDIT

## 2023-11-08 PROCEDURE — 1090000002 HH PPS REVENUE DEBIT

## 2023-11-09 PROCEDURE — 1090000001 HH PPS REVENUE CREDIT

## 2023-11-09 PROCEDURE — 1090000002 HH PPS REVENUE DEBIT

## 2023-11-10 PROCEDURE — 1090000002 HH PPS REVENUE DEBIT

## 2023-11-10 PROCEDURE — 1090000001 HH PPS REVENUE CREDIT

## 2023-11-11 PROCEDURE — 1090000002 HH PPS REVENUE DEBIT

## 2023-11-11 PROCEDURE — 1090000001 HH PPS REVENUE CREDIT

## 2023-11-12 PROCEDURE — 1090000001 HH PPS REVENUE CREDIT

## 2023-11-12 PROCEDURE — 1090000002 HH PPS REVENUE DEBIT

## 2023-11-13 PROCEDURE — 1090000001 HH PPS REVENUE CREDIT

## 2023-11-13 PROCEDURE — 1090000002 HH PPS REVENUE DEBIT

## 2023-11-14 PROCEDURE — 1090000001 HH PPS REVENUE CREDIT

## 2023-11-14 PROCEDURE — 1090000002 HH PPS REVENUE DEBIT

## 2023-11-15 PROCEDURE — 1090000001 HH PPS REVENUE CREDIT

## 2023-11-15 PROCEDURE — 1090000002 HH PPS REVENUE DEBIT

## 2023-11-16 PROCEDURE — 1090000002 HH PPS REVENUE DEBIT

## 2023-11-16 PROCEDURE — 1090000001 HH PPS REVENUE CREDIT

## 2023-11-17 DIAGNOSIS — M54.40 CHRONIC LOW BACK PAIN WITH SCIATICA, SCIATICA LATERALITY UNSPECIFIED, UNSPECIFIED BACK PAIN LATERALITY: ICD-10-CM

## 2023-11-17 DIAGNOSIS — G89.29 CHRONIC LOW BACK PAIN WITH SCIATICA, SCIATICA LATERALITY UNSPECIFIED, UNSPECIFIED BACK PAIN LATERALITY: ICD-10-CM

## 2023-11-17 PROCEDURE — 1090000002 HH PPS REVENUE DEBIT

## 2023-11-17 PROCEDURE — 1090000001 HH PPS REVENUE CREDIT

## 2023-11-17 RX ORDER — GABAPENTIN 100 MG/1
100 CAPSULE ORAL NIGHTLY
Qty: 90 CAPSULE | Refills: 0 | Status: SHIPPED | OUTPATIENT
Start: 2023-11-17 | End: 2024-02-13 | Stop reason: SDUPTHER

## 2023-11-17 NOTE — TELEPHONE ENCOUNTER
Rx Refill Request Telephone Encounter    Name:  Alyce Dougherty  :  [id number]  Specific Pharmacy location:  [facility]

## 2023-11-18 PROCEDURE — 1090000002 HH PPS REVENUE DEBIT

## 2023-11-18 PROCEDURE — 1090000001 HH PPS REVENUE CREDIT

## 2023-11-19 PROCEDURE — 1090000002 HH PPS REVENUE DEBIT

## 2023-11-19 PROCEDURE — 1090000001 HH PPS REVENUE CREDIT

## 2023-11-20 PROCEDURE — 1090000001 HH PPS REVENUE CREDIT

## 2023-11-20 PROCEDURE — 1090000002 HH PPS REVENUE DEBIT

## 2023-11-21 PROCEDURE — 1090000001 HH PPS REVENUE CREDIT

## 2023-11-21 PROCEDURE — 1090000002 HH PPS REVENUE DEBIT

## 2023-11-22 PROCEDURE — 1090000002 HH PPS REVENUE DEBIT

## 2023-11-22 PROCEDURE — 1090000001 HH PPS REVENUE CREDIT

## 2023-11-23 PROCEDURE — 1090000001 HH PPS REVENUE CREDIT

## 2023-11-23 PROCEDURE — 1090000002 HH PPS REVENUE DEBIT

## 2023-11-24 PROCEDURE — 1090000002 HH PPS REVENUE DEBIT

## 2023-11-24 PROCEDURE — 1090000001 HH PPS REVENUE CREDIT

## 2023-11-25 PROCEDURE — 1090000001 HH PPS REVENUE CREDIT

## 2023-11-25 PROCEDURE — 1090000002 HH PPS REVENUE DEBIT

## 2023-11-26 PROCEDURE — 1090000001 HH PPS REVENUE CREDIT

## 2023-11-26 PROCEDURE — 1090000002 HH PPS REVENUE DEBIT

## 2023-11-27 PROCEDURE — 1090000001 HH PPS REVENUE CREDIT

## 2023-11-27 PROCEDURE — 1090000002 HH PPS REVENUE DEBIT

## 2023-11-28 PROCEDURE — 1090000001 HH PPS REVENUE CREDIT

## 2023-11-28 PROCEDURE — 1090000002 HH PPS REVENUE DEBIT

## 2023-11-29 PROCEDURE — 1090000001 HH PPS REVENUE CREDIT

## 2023-11-29 PROCEDURE — 1090000002 HH PPS REVENUE DEBIT

## 2023-11-30 PROCEDURE — 1090000002 HH PPS REVENUE DEBIT

## 2023-11-30 PROCEDURE — 1090000001 HH PPS REVENUE CREDIT

## 2023-12-01 PROCEDURE — 1090000001 HH PPS REVENUE CREDIT

## 2023-12-01 PROCEDURE — 1090000002 HH PPS REVENUE DEBIT

## 2023-12-02 PROCEDURE — 1090000002 HH PPS REVENUE DEBIT

## 2023-12-02 PROCEDURE — 1090000001 HH PPS REVENUE CREDIT

## 2023-12-03 PROCEDURE — 1090000001 HH PPS REVENUE CREDIT

## 2023-12-03 PROCEDURE — 1090000002 HH PPS REVENUE DEBIT

## 2023-12-04 PROCEDURE — 1090000002 HH PPS REVENUE DEBIT

## 2023-12-04 PROCEDURE — 1090000001 HH PPS REVENUE CREDIT

## 2023-12-05 PROCEDURE — 1090000002 HH PPS REVENUE DEBIT

## 2023-12-05 PROCEDURE — 1090000001 HH PPS REVENUE CREDIT

## 2023-12-06 PROCEDURE — 1090000001 HH PPS REVENUE CREDIT

## 2023-12-06 PROCEDURE — 1090000002 HH PPS REVENUE DEBIT

## 2023-12-07 ENCOUNTER — HOME CARE VISIT (OUTPATIENT)
Dept: HOME HEALTH SERVICES | Facility: HOME HEALTH | Age: 83
End: 2023-12-07

## 2023-12-07 PROCEDURE — 1090000001 HH PPS REVENUE CREDIT

## 2023-12-07 PROCEDURE — 1090000002 HH PPS REVENUE DEBIT

## 2023-12-13 DIAGNOSIS — R63.0 POOR APPETITE: ICD-10-CM

## 2023-12-15 RX ORDER — MIRTAZAPINE 7.5 MG/1
7.5 TABLET, FILM COATED ORAL NIGHTLY
Qty: 90 TABLET | Refills: 0 | Status: SHIPPED | OUTPATIENT
Start: 2023-12-15 | End: 2024-03-28 | Stop reason: SDUPTHER

## 2023-12-26 ASSESSMENT — ACTIVITIES OF DAILY LIVING (ADL)
OASIS_M1830: 00
HOME_HEALTH_OASIS: 00

## 2023-12-28 ENCOUNTER — HOSPITAL ENCOUNTER (EMERGENCY)
Facility: HOSPITAL | Age: 83
Discharge: HOME | End: 2023-12-28
Attending: EMERGENCY MEDICINE
Payer: COMMERCIAL

## 2023-12-28 VITALS
RESPIRATION RATE: 18 BRPM | SYSTOLIC BLOOD PRESSURE: 178 MMHG | OXYGEN SATURATION: 97 % | TEMPERATURE: 98.6 F | HEART RATE: 79 BPM | DIASTOLIC BLOOD PRESSURE: 77 MMHG | WEIGHT: 120 LBS

## 2023-12-28 DIAGNOSIS — T82.9XXA COMPLICATION OF ARTERIOVENOUS DIALYSIS FISTULA, INITIAL ENCOUNTER: Primary | ICD-10-CM

## 2023-12-28 PROCEDURE — 99283 EMERGENCY DEPT VISIT LOW MDM: CPT | Performed by: EMERGENCY MEDICINE

## 2023-12-28 ASSESSMENT — PAIN SCALES - GENERAL
PAINLEVEL_OUTOF10: 0 - NO PAIN

## 2023-12-28 ASSESSMENT — PAIN - FUNCTIONAL ASSESSMENT: PAIN_FUNCTIONAL_ASSESSMENT: 0-10

## 2023-12-28 NOTE — DISCHARGE INSTRUCTIONS
Please follow-up with your vascular surgeons to continue care for your fistula.  Please return ED for being bleeding, pain, dialysis fistula not working or any other concerning symptoms

## 2023-12-28 NOTE — ED PROVIDER NOTES
HPI   Chief Complaint   Patient presents with   • Coagulation Disorder     Fistula would not stop bleeding       83-year-old woman here with bleeding from her dialysis fistula.  Dressing was applied by EMS and bleeding is now stopped.  Denies any chest or abdominal pain.  No blood thinner usage.  No infectious symptoms.                        [place] Coma Scale Score: 15                  Patient History   No past medical history on file.  No past surgical history on file.  No family history on file.  Social History     Tobacco Use   • Smoking status: Not on file   • Smokeless tobacco: Not on file   Substance Use Topics   • Alcohol use: Not on file   • Drug use: Not on file       Physical Exam   ED Triage Vitals [12/28/23 1308]   Temp Heart Rate Resp BP   37 °C (98.6 °F) 80 16 (!) 186/80      SpO2 Temp src Heart Rate Source Patient Position   98 % -- -- --      BP Location FiO2 (%)     Right arm --       Physical Exam  Vitals and nursing note reviewed.   Constitutional:       Appearance: Normal appearance. She is normal weight.   HENT:      Head: Normocephalic and atraumatic.      Mouth/Throat:      Mouth: Mucous membranes are dry.      Pharynx: Oropharynx is clear.   Eyes:      Extraocular Movements: Extraocular movements intact.      Conjunctiva/sclera: Conjunctivae normal.      Pupils: Pupils are equal, round, and reactive to light.   Cardiovascular:      Rate and Rhythm: Normal rate and regular rhythm.      Pulses: Normal pulses.      Heart sounds: Normal heart sounds.   Pulmonary:      Effort: Pulmonary effort is normal.      Breath sounds: Normal breath sounds.   Abdominal:      General: Abdomen is flat. Bowel sounds are normal. There is no distension.      Tenderness: There is no abdominal tenderness. There is no right CVA tenderness, left CVA tenderness or guarding.   Musculoskeletal:         General: Normal range of motion.      Cervical back: Normal range of motion and neck supple.      Comments: The left  arm does have fistula noted.  Thrill is noted.  Pressure dressing was applied to the arm by EMS this is resolved bleeding.  Did remove this and no further episode of bleeding is noted.  Dressing is replaced    Skin:     General: Skin is warm.      Capillary Refill: Capillary refill takes less than 2 seconds.   Neurological:      General: No focal deficit present.      Mental Status: She is alert and oriented to person, place, and time. Mental status is at baseline.   Psychiatric:         Mood and Affect: Mood normal.         Behavior: Behavior normal.         Thought Content: Thought content normal.         Judgment: Judgment normal.         ED Course & MDM   Diagnoses as of 12/28/23 1353   Complication of arteriovenous dialysis fistula, initial encounter       Medical Decision Making  I did remove the pressure dressing applied by EMS which did resolve her bleeding.  No further bleeding is noted.  No chest or abdominal pain.  Patient be safely discharged home plan to follow-up with outpatient provider.  Return precautions are discussed.        Procedure  Procedures     Adithya Hoover MD  12/28/23 8186

## 2024-01-11 ENCOUNTER — LAB REQUISITION (OUTPATIENT)
Dept: LAB | Facility: HOSPITAL | Age: 84
End: 2024-01-11
Payer: COMMERCIAL

## 2024-01-11 LAB — POTASSIUM SERPL-SCNC: 4.9 MMOL/L (ref 3.5–5.3)

## 2024-01-11 PROCEDURE — 84132 ASSAY OF SERUM POTASSIUM: CPT

## 2024-01-30 ENCOUNTER — HOSPITAL ENCOUNTER (OUTPATIENT)
Facility: HOSPITAL | Age: 84
Setting detail: OBSERVATION
Discharge: HOME | End: 2024-01-31
Attending: EMERGENCY MEDICINE | Admitting: INTERNAL MEDICINE
Payer: COMMERCIAL

## 2024-01-30 ENCOUNTER — APPOINTMENT (OUTPATIENT)
Dept: RADIOLOGY | Facility: HOSPITAL | Age: 84
End: 2024-01-30
Payer: COMMERCIAL

## 2024-01-30 DIAGNOSIS — J40 BRONCHITIS: Primary | ICD-10-CM

## 2024-01-30 DIAGNOSIS — J20.9 ACUTE BRONCHITIS, UNSPECIFIED ORGANISM: ICD-10-CM

## 2024-01-30 PROBLEM — I10 ESSENTIAL HYPERTENSION: Chronic | Status: ACTIVE | Noted: 2023-03-14

## 2024-01-30 PROBLEM — Z99.2 ESRD (END STAGE RENAL DISEASE) ON DIALYSIS (MULTI): Chronic | Status: ACTIVE | Noted: 2023-03-14

## 2024-01-30 PROBLEM — I73.9 PAD (PERIPHERAL ARTERY DISEASE) (CMS-HCC): Chronic | Status: ACTIVE | Noted: 2023-03-14

## 2024-01-30 PROBLEM — N18.6 ESRD (END STAGE RENAL DISEASE) ON DIALYSIS (MULTI): Chronic | Status: ACTIVE | Noted: 2023-03-14

## 2024-01-30 PROBLEM — J81.0 ACUTE PULMONARY EDEMA (MULTI): Status: ACTIVE | Noted: 2024-01-30

## 2024-01-30 PROBLEM — J44.9 COPD (CHRONIC OBSTRUCTIVE PULMONARY DISEASE) (MULTI): Chronic | Status: ACTIVE | Noted: 2023-03-14

## 2024-01-30 PROBLEM — Z85.3 PERSONAL HISTORY OF MALIGNANT NEOPLASM OF BREAST: Status: ACTIVE | Noted: 2024-01-30

## 2024-01-30 LAB
ALBUMIN SERPL BCP-MCNC: 3.7 G/DL (ref 3.4–5)
ALP SERPL-CCNC: 285 U/L (ref 33–136)
ALT SERPL W P-5'-P-CCNC: 22 U/L (ref 7–45)
ANION GAP SERPL CALC-SCNC: 19 MMOL/L (ref 10–20)
AST SERPL W P-5'-P-CCNC: 21 U/L (ref 9–39)
BASOPHILS # BLD AUTO: 0.03 X10*3/UL (ref 0–0.1)
BASOPHILS NFR BLD AUTO: 0.3 %
BILIRUB SERPL-MCNC: 0.8 MG/DL (ref 0–1.2)
BNP SERPL-MCNC: 498 PG/ML (ref 0–99)
BUN SERPL-MCNC: 25 MG/DL (ref 6–23)
CALCIUM SERPL-MCNC: 9.3 MG/DL (ref 8.6–10.3)
CARDIAC TROPONIN I PNL SERPL HS: 30 NG/L (ref 0–13)
CHLORIDE SERPL-SCNC: 88 MMOL/L (ref 98–107)
CO2 SERPL-SCNC: 29 MMOL/L (ref 21–32)
CREAT SERPL-MCNC: 4.7 MG/DL (ref 0.5–1.05)
EGFRCR SERPLBLD CKD-EPI 2021: 9 ML/MIN/1.73M*2
EOSINOPHIL # BLD AUTO: 0.17 X10*3/UL (ref 0–0.4)
EOSINOPHIL NFR BLD AUTO: 1.6 %
ERYTHROCYTE [DISTWIDTH] IN BLOOD BY AUTOMATED COUNT: 16.2 % (ref 11.5–14.5)
FLUAV RNA RESP QL NAA+PROBE: NOT DETECTED
FLUBV RNA RESP QL NAA+PROBE: NOT DETECTED
GLUCOSE SERPL-MCNC: 97 MG/DL (ref 74–99)
HCT VFR BLD AUTO: 42.2 % (ref 36–46)
HGB BLD-MCNC: 12.8 G/DL (ref 12–16)
IMM GRANULOCYTES # BLD AUTO: 0.04 X10*3/UL (ref 0–0.5)
IMM GRANULOCYTES NFR BLD AUTO: 0.4 % (ref 0–0.9)
LYMPHOCYTES # BLD AUTO: 0.73 X10*3/UL (ref 0.8–3)
LYMPHOCYTES NFR BLD AUTO: 6.8 %
MCH RBC QN AUTO: 27.5 PG (ref 26–34)
MCHC RBC AUTO-ENTMCNC: 30.3 G/DL (ref 32–36)
MCV RBC AUTO: 91 FL (ref 80–100)
MONOCYTES # BLD AUTO: 0.81 X10*3/UL (ref 0.05–0.8)
MONOCYTES NFR BLD AUTO: 7.6 %
NEUTROPHILS # BLD AUTO: 8.9 X10*3/UL (ref 1.6–5.5)
NEUTROPHILS NFR BLD AUTO: 83.3 %
NRBC BLD-RTO: 0 /100 WBCS (ref 0–0)
PLATELET # BLD AUTO: 182 X10*3/UL (ref 150–450)
POTASSIUM SERPL-SCNC: 4.8 MMOL/L (ref 3.5–5.3)
PROT SERPL-MCNC: 8.6 G/DL (ref 6.4–8.2)
RBC # BLD AUTO: 4.65 X10*6/UL (ref 4–5.2)
SARS-COV-2 RNA RESP QL NAA+PROBE: NOT DETECTED
SODIUM SERPL-SCNC: 131 MMOL/L (ref 136–145)
WBC # BLD AUTO: 10.7 X10*3/UL (ref 4.4–11.3)

## 2024-01-30 PROCEDURE — 2500000001 HC RX 250 WO HCPCS SELF ADMINISTERED DRUGS (ALT 637 FOR MEDICARE OP): Performed by: EMERGENCY MEDICINE

## 2024-01-30 PROCEDURE — 96372 THER/PROPH/DIAG INJ SC/IM: CPT

## 2024-01-30 PROCEDURE — 84484 ASSAY OF TROPONIN QUANT: CPT | Performed by: PHYSICIAN ASSISTANT

## 2024-01-30 PROCEDURE — G0378 HOSPITAL OBSERVATION PER HR: HCPCS

## 2024-01-30 PROCEDURE — 83880 ASSAY OF NATRIURETIC PEPTIDE: CPT | Performed by: PHYSICIAN ASSISTANT

## 2024-01-30 PROCEDURE — 85025 COMPLETE CBC W/AUTO DIFF WBC: CPT | Performed by: PHYSICIAN ASSISTANT

## 2024-01-30 PROCEDURE — 99222 1ST HOSP IP/OBS MODERATE 55: CPT | Performed by: PHYSICIAN ASSISTANT

## 2024-01-30 PROCEDURE — 71046 X-RAY EXAM CHEST 2 VIEWS: CPT | Mod: FR

## 2024-01-30 PROCEDURE — 71046 X-RAY EXAM CHEST 2 VIEWS: CPT | Mod: FOREIGN READ | Performed by: RADIOLOGY

## 2024-01-30 PROCEDURE — 2500000004 HC RX 250 GENERAL PHARMACY W/ HCPCS (ALT 636 FOR OP/ED): Performed by: EMERGENCY MEDICINE

## 2024-01-30 PROCEDURE — 99285 EMERGENCY DEPT VISIT HI MDM: CPT | Performed by: EMERGENCY MEDICINE

## 2024-01-30 PROCEDURE — 2500000002 HC RX 250 W HCPCS SELF ADMINISTERED DRUGS (ALT 637 FOR MEDICARE OP, ALT 636 FOR OP/ED): Mod: MUE | Performed by: PHYSICIAN ASSISTANT

## 2024-01-30 PROCEDURE — 2500000004 HC RX 250 GENERAL PHARMACY W/ HCPCS (ALT 636 FOR OP/ED): Performed by: PHYSICIAN ASSISTANT

## 2024-01-30 PROCEDURE — 94640 AIRWAY INHALATION TREATMENT: CPT

## 2024-01-30 PROCEDURE — 87636 SARSCOV2 & INF A&B AMP PRB: CPT | Performed by: EMERGENCY MEDICINE

## 2024-01-30 PROCEDURE — 36415 COLL VENOUS BLD VENIPUNCTURE: CPT | Performed by: PHYSICIAN ASSISTANT

## 2024-01-30 PROCEDURE — 84075 ASSAY ALKALINE PHOSPHATASE: CPT | Performed by: PHYSICIAN ASSISTANT

## 2024-01-30 RX ORDER — TALC
3 POWDER (GRAM) TOPICAL
Status: DISCONTINUED | OUTPATIENT
Start: 2024-01-31 | End: 2024-01-31 | Stop reason: HOSPADM

## 2024-01-30 RX ORDER — ALBUTEROL SULFATE 0.83 MG/ML
2.5 SOLUTION RESPIRATORY (INHALATION) EVERY 4 HOURS PRN
Status: DISCONTINUED | OUTPATIENT
Start: 2024-01-30 | End: 2024-01-31 | Stop reason: HOSPADM

## 2024-01-30 RX ORDER — DOCUSATE SODIUM 100 MG/1
100 CAPSULE, LIQUID FILLED ORAL 2 TIMES DAILY PRN
Status: DISCONTINUED | OUTPATIENT
Start: 2024-01-30 | End: 2024-01-31 | Stop reason: HOSPADM

## 2024-01-30 RX ORDER — PREDNISONE 20 MG/1
40 TABLET ORAL ONCE
Status: COMPLETED | OUTPATIENT
Start: 2024-01-30 | End: 2024-01-30

## 2024-01-30 RX ORDER — AZITHROMYCIN 500 MG/1
500 TABLET, FILM COATED ORAL ONCE
Status: COMPLETED | OUTPATIENT
Start: 2024-01-30 | End: 2024-01-30

## 2024-01-30 RX ORDER — IPRATROPIUM BROMIDE AND ALBUTEROL SULFATE 2.5; .5 MG/3ML; MG/3ML
3 SOLUTION RESPIRATORY (INHALATION)
Status: DISCONTINUED | OUTPATIENT
Start: 2024-01-31 | End: 2024-01-31 | Stop reason: HOSPADM

## 2024-01-30 RX ORDER — ACETAMINOPHEN 325 MG/1
950 TABLET ORAL EVERY 6 HOURS PRN
Status: DISCONTINUED | OUTPATIENT
Start: 2024-01-30 | End: 2024-01-31 | Stop reason: HOSPADM

## 2024-01-30 RX ORDER — PANTOPRAZOLE SODIUM 40 MG/10ML
40 INJECTION, POWDER, LYOPHILIZED, FOR SOLUTION INTRAVENOUS
Status: DISCONTINUED | OUTPATIENT
Start: 2024-01-31 | End: 2024-01-31 | Stop reason: HOSPADM

## 2024-01-30 RX ORDER — PREDNISONE 20 MG/1
40 TABLET ORAL DAILY
Status: DISCONTINUED | OUTPATIENT
Start: 2024-01-31 | End: 2024-01-31 | Stop reason: HOSPADM

## 2024-01-30 RX ORDER — BUDESONIDE 0.5 MG/2ML
0.5 INHALANT ORAL
Status: DISCONTINUED | OUTPATIENT
Start: 2024-01-30 | End: 2024-01-31 | Stop reason: HOSPADM

## 2024-01-30 RX ORDER — PANTOPRAZOLE SODIUM 40 MG/1
40 TABLET, DELAYED RELEASE ORAL
Status: DISCONTINUED | OUTPATIENT
Start: 2024-01-31 | End: 2024-01-31 | Stop reason: HOSPADM

## 2024-01-30 RX ORDER — HEPARIN SODIUM 5000 [USP'U]/ML
5000 INJECTION, SOLUTION INTRAVENOUS; SUBCUTANEOUS EVERY 8 HOURS
Status: DISCONTINUED | OUTPATIENT
Start: 2024-01-30 | End: 2024-01-31 | Stop reason: HOSPADM

## 2024-01-30 RX ORDER — IPRATROPIUM BROMIDE AND ALBUTEROL SULFATE 2.5; .5 MG/3ML; MG/3ML
3 SOLUTION RESPIRATORY (INHALATION)
Status: DISCONTINUED | OUTPATIENT
Start: 2024-01-30 | End: 2024-01-30

## 2024-01-30 RX ORDER — AZITHROMYCIN 250 MG/1
250 TABLET, FILM COATED ORAL
Status: DISCONTINUED | OUTPATIENT
Start: 2024-01-31 | End: 2024-01-31 | Stop reason: HOSPADM

## 2024-01-30 RX ADMIN — AZITHROMYCIN DIHYDRATE 500 MG: 500 TABLET ORAL at 21:24

## 2024-01-30 RX ADMIN — HEPARIN SODIUM 5000 UNITS: 5000 INJECTION INTRAVENOUS; SUBCUTANEOUS at 22:50

## 2024-01-30 RX ADMIN — IPRATROPIUM BROMIDE AND ALBUTEROL SULFATE 3 ML: .5; 3 SOLUTION RESPIRATORY (INHALATION) at 22:18

## 2024-01-30 RX ADMIN — BUDESONIDE 0.5 MG: 0.5 INHALANT ORAL at 22:18

## 2024-01-30 RX ADMIN — PREDNISONE 40 MG: 20 TABLET ORAL at 21:24

## 2024-01-30 ASSESSMENT — PAIN - FUNCTIONAL ASSESSMENT: PAIN_FUNCTIONAL_ASSESSMENT: 0-10

## 2024-01-30 ASSESSMENT — COLUMBIA-SUICIDE SEVERITY RATING SCALE - C-SSRS
2. HAVE YOU ACTUALLY HAD ANY THOUGHTS OF KILLING YOURSELF?: NO
1. IN THE PAST MONTH, HAVE YOU WISHED YOU WERE DEAD OR WISHED YOU COULD GO TO SLEEP AND NOT WAKE UP?: NO
6. HAVE YOU EVER DONE ANYTHING, STARTED TO DO ANYTHING, OR PREPARED TO DO ANYTHING TO END YOUR LIFE?: NO

## 2024-01-30 ASSESSMENT — PAIN SCALES - GENERAL: PAINLEVEL_OUTOF10: 0 - NO PAIN

## 2024-01-30 NOTE — ED TRIAGE NOTES
TRIAGE NOTE   I saw the patient as the Clinician in Triage and performed a brief history and physical exam, established acuity, and ordered appropriate tests to develop basic plan of care. Patient will be seen by an NAJMA, resident and/or physician who will independently evaluate the patient. Please see subsequent provider notes for further details and disposition.     Brief HPI: In brief, Alyce Dougherty is a 83 y.o. female that presents for persistent cough for several weeks.  ESRD on dialysis.  Last dialysis today.  Cough is occasionally productive.  No hemoptysis.  No chest pain pleuritic pain.  Temperature in triage 100.2.  No respiratory difficulty.  No increased work of breathing.  She is not on ACE inhibitors.    Focused Physical exam:   Temp 37.9.  No tachycardia tachypnea.  Pulse ox 93%.  No distress nontoxic speaking easily in full sentences.  Cardiovascular RRR.  Lungs diminished breath sounds throughout.  Plan/MDM:   Workup initiated.  Working diagnosis: Pneumonia bronchitis CHF rule out COVID influenza.  Stable pending further evaluation  Please see subsequent provider note for further details and disposition

## 2024-01-31 ENCOUNTER — PHARMACY VISIT (OUTPATIENT)
Dept: PHARMACY | Facility: CLINIC | Age: 84
End: 2024-01-31
Payer: COMMERCIAL

## 2024-01-31 VITALS
SYSTOLIC BLOOD PRESSURE: 112 MMHG | OXYGEN SATURATION: 93 % | HEART RATE: 66 BPM | TEMPERATURE: 98.2 F | RESPIRATION RATE: 17 BRPM | HEIGHT: 67 IN | WEIGHT: 116.84 LBS | DIASTOLIC BLOOD PRESSURE: 55 MMHG | BODY MASS INDEX: 18.34 KG/M2

## 2024-01-31 PROBLEM — E78.5 HYPERLIPIDEMIA: Chronic | Status: ACTIVE | Noted: 2023-03-14

## 2024-01-31 PROBLEM — I50.42 CHRONIC COMBINED SYSTOLIC AND DIASTOLIC CONGESTIVE HEART FAILURE (MULTI): Chronic | Status: ACTIVE | Noted: 2024-01-31

## 2024-01-31 LAB
ANION GAP SERPL CALC-SCNC: 19 MMOL/L (ref 10–20)
ANION GAP SERPL CALC-SCNC: 21 MMOL/L (ref 10–20)
BUN SERPL-MCNC: 45 MG/DL (ref 6–23)
BUN SERPL-MCNC: 56 MG/DL (ref 6–23)
CALCIUM SERPL-MCNC: 8.8 MG/DL (ref 8.6–10.3)
CALCIUM SERPL-MCNC: 9 MG/DL (ref 8.6–10.3)
CHLORIDE SERPL-SCNC: 87 MMOL/L (ref 98–107)
CHLORIDE SERPL-SCNC: 89 MMOL/L (ref 98–107)
CO2 SERPL-SCNC: 23 MMOL/L (ref 21–32)
CO2 SERPL-SCNC: 24 MMOL/L (ref 21–32)
CREAT SERPL-MCNC: 6.21 MG/DL (ref 0.5–1.05)
CREAT SERPL-MCNC: 6.97 MG/DL (ref 0.5–1.05)
EGFRCR SERPLBLD CKD-EPI 2021: 5 ML/MIN/1.73M*2
EGFRCR SERPLBLD CKD-EPI 2021: 6 ML/MIN/1.73M*2
ERYTHROCYTE [DISTWIDTH] IN BLOOD BY AUTOMATED COUNT: 16.1 % (ref 11.5–14.5)
GLUCOSE SERPL-MCNC: 155 MG/DL (ref 74–99)
GLUCOSE SERPL-MCNC: 269 MG/DL (ref 74–99)
HCT VFR BLD AUTO: 40 % (ref 36–46)
HGB BLD-MCNC: 12.3 G/DL (ref 12–16)
LACTATE SERPL-SCNC: 0.9 MMOL/L (ref 0.4–2)
MCH RBC QN AUTO: 28 PG (ref 26–34)
MCHC RBC AUTO-ENTMCNC: 30.8 G/DL (ref 32–36)
MCV RBC AUTO: 91 FL (ref 80–100)
NRBC BLD-RTO: 0 /100 WBCS (ref 0–0)
PLATELET # BLD AUTO: 178 X10*3/UL (ref 150–450)
POTASSIUM SERPL-SCNC: 4.5 MMOL/L (ref 3.5–5.3)
POTASSIUM SERPL-SCNC: 4.8 MMOL/L (ref 3.5–5.3)
RBC # BLD AUTO: 4.39 X10*6/UL (ref 4–5.2)
SODIUM SERPL-SCNC: 126 MMOL/L (ref 136–145)
SODIUM SERPL-SCNC: 127 MMOL/L (ref 136–145)
WBC # BLD AUTO: 9.8 X10*3/UL (ref 4.4–11.3)

## 2024-01-31 PROCEDURE — 99232 SBSQ HOSP IP/OBS MODERATE 35: CPT | Performed by: NURSE PRACTITIONER

## 2024-01-31 PROCEDURE — 94640 AIRWAY INHALATION TREATMENT: CPT

## 2024-01-31 PROCEDURE — 80048 BASIC METABOLIC PNL TOTAL CA: CPT | Performed by: NURSE PRACTITIONER

## 2024-01-31 PROCEDURE — 83605 ASSAY OF LACTIC ACID: CPT | Performed by: PHYSICIAN ASSISTANT

## 2024-01-31 PROCEDURE — 87040 BLOOD CULTURE FOR BACTERIA: CPT | Mod: AHULAB | Performed by: PHYSICIAN ASSISTANT

## 2024-01-31 PROCEDURE — G0378 HOSPITAL OBSERVATION PER HR: HCPCS

## 2024-01-31 PROCEDURE — RXMED WILLOW AMBULATORY MEDICATION CHARGE

## 2024-01-31 PROCEDURE — 80048 BASIC METABOLIC PNL TOTAL CA: CPT | Performed by: PHYSICIAN ASSISTANT

## 2024-01-31 PROCEDURE — 36415 COLL VENOUS BLD VENIPUNCTURE: CPT | Performed by: PHYSICIAN ASSISTANT

## 2024-01-31 PROCEDURE — 2500000002 HC RX 250 W HCPCS SELF ADMINISTERED DRUGS (ALT 637 FOR MEDICARE OP, ALT 636 FOR OP/ED): Mod: MUE | Performed by: PHYSICIAN ASSISTANT

## 2024-01-31 PROCEDURE — 2500000001 HC RX 250 WO HCPCS SELF ADMINISTERED DRUGS (ALT 637 FOR MEDICARE OP): Performed by: PHYSICIAN ASSISTANT

## 2024-01-31 PROCEDURE — 85027 COMPLETE CBC AUTOMATED: CPT | Performed by: PHYSICIAN ASSISTANT

## 2024-01-31 PROCEDURE — 2500000002 HC RX 250 W HCPCS SELF ADMINISTERED DRUGS (ALT 637 FOR MEDICARE OP, ALT 636 FOR OP/ED): Mod: MUE | Performed by: INTERNAL MEDICINE

## 2024-01-31 PROCEDURE — 2500000004 HC RX 250 GENERAL PHARMACY W/ HCPCS (ALT 636 FOR OP/ED): Performed by: PHYSICIAN ASSISTANT

## 2024-01-31 RX ORDER — AZITHROMYCIN 250 MG/1
TABLET, FILM COATED ORAL
Qty: 6 TABLET | Refills: 0 | Status: SHIPPED | OUTPATIENT
Start: 2024-01-31 | End: 2024-02-04 | Stop reason: ENTERED-IN-ERROR

## 2024-01-31 RX ORDER — ASPIRIN 81 MG/1
81 TABLET ORAL DAILY
Status: DISCONTINUED | OUTPATIENT
Start: 2024-01-31 | End: 2024-01-31 | Stop reason: HOSPADM

## 2024-01-31 RX ORDER — AMLODIPINE BESYLATE 5 MG/1
5 TABLET ORAL DAILY
Status: DISCONTINUED | OUTPATIENT
Start: 2024-01-31 | End: 2024-01-31 | Stop reason: HOSPADM

## 2024-01-31 RX ORDER — GABAPENTIN 100 MG/1
100 CAPSULE ORAL NIGHTLY
Status: DISCONTINUED | OUTPATIENT
Start: 2024-01-31 | End: 2024-01-31 | Stop reason: HOSPADM

## 2024-01-31 RX ORDER — SEVELAMER CARBONATE 800 MG/1
800 TABLET, FILM COATED ORAL
Status: DISCONTINUED | OUTPATIENT
Start: 2024-01-31 | End: 2024-01-31 | Stop reason: HOSPADM

## 2024-01-31 RX ORDER — ATORVASTATIN CALCIUM 80 MG/1
80 TABLET, FILM COATED ORAL DAILY
Status: DISCONTINUED | OUTPATIENT
Start: 2024-01-31 | End: 2024-01-31 | Stop reason: HOSPADM

## 2024-01-31 RX ORDER — CARVEDILOL 12.5 MG/1
12.5 TABLET ORAL
Status: DISCONTINUED | OUTPATIENT
Start: 2024-01-31 | End: 2024-01-31 | Stop reason: HOSPADM

## 2024-01-31 RX ORDER — ISOSORBIDE MONONITRATE 30 MG/1
60 TABLET, EXTENDED RELEASE ORAL DAILY
Status: DISCONTINUED | OUTPATIENT
Start: 2024-01-31 | End: 2024-01-31 | Stop reason: HOSPADM

## 2024-01-31 RX ORDER — MIRTAZAPINE 15 MG/1
7.5 TABLET, FILM COATED ORAL NIGHTLY
Status: DISCONTINUED | OUTPATIENT
Start: 2024-01-31 | End: 2024-01-31 | Stop reason: HOSPADM

## 2024-01-31 RX ORDER — AZITHROMYCIN 250 MG/1
250 TABLET, FILM COATED ORAL
Qty: 6 TABLET | Refills: 0 | Status: SHIPPED | OUTPATIENT
Start: 2024-02-01 | End: 2024-02-05 | Stop reason: HOSPADM

## 2024-01-31 RX ORDER — ATORVASTATIN CALCIUM 80 MG/1
80 TABLET, FILM COATED ORAL NIGHTLY
COMMUNITY
End: 2024-02-04 | Stop reason: ENTERED-IN-ERROR

## 2024-01-31 RX ADMIN — AZITHROMYCIN 250 MG: 250 TABLET, FILM COATED ORAL at 09:02

## 2024-01-31 RX ADMIN — BUDESONIDE 0.5 MG: 0.5 INHALANT ORAL at 07:32

## 2024-01-31 RX ADMIN — AMLODIPINE BESYLATE 5 MG: 5 TABLET ORAL at 09:02

## 2024-01-31 RX ADMIN — GABAPENTIN 100 MG: 100 CAPSULE ORAL at 01:00

## 2024-01-31 RX ADMIN — MIRTAZAPINE 7.5 MG: 15 TABLET, FILM COATED ORAL at 01:00

## 2024-01-31 RX ADMIN — TIOTROPIUM BROMIDE INHALATION SPRAY 2 PUFF: 3.12 SPRAY, METERED RESPIRATORY (INHALATION) at 07:42

## 2024-01-31 RX ADMIN — IPRATROPIUM BROMIDE AND ALBUTEROL SULFATE 3 ML: .5; 3 SOLUTION RESPIRATORY (INHALATION) at 12:29

## 2024-01-31 RX ADMIN — CARVEDILOL 12.5 MG: 12.5 TABLET, FILM COATED ORAL at 09:02

## 2024-01-31 RX ADMIN — PREDNISONE 40 MG: 20 TABLET ORAL at 09:02

## 2024-01-31 RX ADMIN — IPRATROPIUM BROMIDE AND ALBUTEROL SULFATE 3 ML: .5; 3 SOLUTION RESPIRATORY (INHALATION) at 07:32

## 2024-01-31 RX ADMIN — SEVELAMER CARBONATE 800 MG: 800 TABLET, FILM COATED ORAL at 09:02

## 2024-01-31 RX ADMIN — ASPIRIN 81 MG: 81 TABLET, COATED ORAL at 09:03

## 2024-01-31 RX ADMIN — ISOSORBIDE MONONITRATE 60 MG: 30 TABLET, EXTENDED RELEASE ORAL at 09:01

## 2024-01-31 RX ADMIN — ATORVASTATIN CALCIUM 80 MG: 80 TABLET, FILM COATED ORAL at 09:02

## 2024-01-31 SDOH — SOCIAL STABILITY: SOCIAL INSECURITY: WERE YOU ABLE TO COMPLETE ALL THE BEHAVIORAL HEALTH SCREENINGS?: YES

## 2024-01-31 SDOH — SOCIAL STABILITY: SOCIAL INSECURITY: ARE YOU OR HAVE YOU BEEN THREATENED OR ABUSED PHYSICALLY, EMOTIONALLY, OR SEXUALLY BY ANYONE?: NO

## 2024-01-31 SDOH — SOCIAL STABILITY: SOCIAL INSECURITY: ARE THERE ANY APPARENT SIGNS OF INJURIES/BEHAVIORS THAT COULD BE RELATED TO ABUSE/NEGLECT?: NO

## 2024-01-31 SDOH — SOCIAL STABILITY: SOCIAL INSECURITY: HAS ANYONE EVER THREATENED TO HURT YOUR FAMILY OR YOUR PETS?: NO

## 2024-01-31 SDOH — SOCIAL STABILITY: SOCIAL INSECURITY: HAVE YOU HAD THOUGHTS OF HARMING ANYONE ELSE?: NO

## 2024-01-31 SDOH — SOCIAL STABILITY: SOCIAL INSECURITY: DO YOU FEEL UNSAFE GOING BACK TO THE PLACE WHERE YOU ARE LIVING?: NO

## 2024-01-31 SDOH — SOCIAL STABILITY: SOCIAL INSECURITY: ABUSE: ADULT

## 2024-01-31 SDOH — SOCIAL STABILITY: SOCIAL INSECURITY: DOES ANYONE TRY TO KEEP YOU FROM HAVING/CONTACTING OTHER FRIENDS OR DOING THINGS OUTSIDE YOUR HOME?: NO

## 2024-01-31 SDOH — SOCIAL STABILITY: SOCIAL INSECURITY: DO YOU FEEL ANYONE HAS EXPLOITED OR TAKEN ADVANTAGE OF YOU FINANCIALLY OR OF YOUR PERSONAL PROPERTY?: NO

## 2024-01-31 ASSESSMENT — COGNITIVE AND FUNCTIONAL STATUS - GENERAL
WALKING IN HOSPITAL ROOM: A LITTLE
DAILY ACTIVITIY SCORE: 24
CLIMB 3 TO 5 STEPS WITH RAILING: A LITTLE
DAILY ACTIVITIY SCORE: 24
MOBILITY SCORE: 22
MOBILITY SCORE: 22
CLIMB 3 TO 5 STEPS WITH RAILING: A LITTLE
WALKING IN HOSPITAL ROOM: A LITTLE
PATIENT BASELINE BEDBOUND: NO

## 2024-01-31 ASSESSMENT — ENCOUNTER SYMPTOMS
COUGH: 1
ENDOCRINE NEGATIVE: 1
ALLERGIC/IMMUNOLOGIC NEGATIVE: 1
GASTROINTESTINAL NEGATIVE: 1
EYES NEGATIVE: 1
NEUROLOGICAL NEGATIVE: 1
CHILLS: 0
SHORTNESS OF BREATH: 1
HEMATOLOGIC/LYMPHATIC NEGATIVE: 1
FEVER: 1
PSYCHIATRIC NEGATIVE: 1
CARDIOVASCULAR NEGATIVE: 1
MUSCULOSKELETAL NEGATIVE: 1

## 2024-01-31 ASSESSMENT — ACTIVITIES OF DAILY LIVING (ADL)
ADEQUATE_TO_COMPLETE_ADL: YES
ASSISTIVE_DEVICE: WALKER
LACK_OF_TRANSPORTATION: NO
BATHING: INDEPENDENT
TOILETING: INDEPENDENT
JUDGMENT_ADEQUATE_SAFELY_COMPLETE_DAILY_ACTIVITIES: YES
PATIENT'S MEMORY ADEQUATE TO SAFELY COMPLETE DAILY ACTIVITIES?: YES
HEARING - RIGHT EAR: HEARING AID
WALKS IN HOME: NEEDS ASSISTANCE
GROOMING: INDEPENDENT
FEEDING YOURSELF: INDEPENDENT
DRESSING YOURSELF: INDEPENDENT
HEARING - LEFT EAR: HEARING AID

## 2024-01-31 ASSESSMENT — LIFESTYLE VARIABLES
HOW MANY STANDARD DRINKS CONTAINING ALCOHOL DO YOU HAVE ON A TYPICAL DAY: PATIENT DOES NOT DRINK
SKIP TO QUESTIONS 9-10: 1
AUDIT-C TOTAL SCORE: 0
HOW OFTEN DO YOU HAVE 6 OR MORE DRINKS ON ONE OCCASION: NEVER
AUDIT-C TOTAL SCORE: 0
HOW OFTEN DO YOU HAVE A DRINK CONTAINING ALCOHOL: NEVER

## 2024-01-31 ASSESSMENT — PATIENT HEALTH QUESTIONNAIRE - PHQ9
2. FEELING DOWN, DEPRESSED OR HOPELESS: NOT AT ALL
SUM OF ALL RESPONSES TO PHQ9 QUESTIONS 1 & 2: 0
1. LITTLE INTEREST OR PLEASURE IN DOING THINGS: NOT AT ALL

## 2024-01-31 ASSESSMENT — PAIN - FUNCTIONAL ASSESSMENT: PAIN_FUNCTIONAL_ASSESSMENT: 0-10

## 2024-01-31 ASSESSMENT — PAIN SCALES - GENERAL
PAINLEVEL_OUTOF10: 0 - NO PAIN
PAINLEVEL_OUTOF10: 0 - NO PAIN

## 2024-01-31 NOTE — ED PROVIDER NOTES
HPI   Chief Complaint   Patient presents with    Cough     Pt arrived from home with complaints of coughing. Pt is on dialysis had dialysis treatment. Pt has had a cough for 3 weeks. Pt was seen for it. Pt was on a medication for it but has not helped. Pt has no other complaints. Pt has no sob. Pt states coughing gets worse when laying down.       HPI  Patient is an 83-year-old female with a past medical history significant for ESRD on hemodialysis with presents emergency for chronic cough.  She states for about 3 to 4 weeks she has been experiencing cough.  Sometimes it is productive and sometimes it is not.  He denies any chest pain, shortness of breath but did feel tired after dialysis for a little bit today she states that her primary care physician prescribed albuterol but she often forgets to take denies any fevers or chills, nausea, vomiting or other symptoms at this time.  Of note, patient was a former smoker but quit several decades ago.    PMHx: As above  PSHx: Denies pertinent  FamilyHx:  SocialHx: Denies pertinent as above  Allergies: Per EMR  Medications: See Medication Reconciliation     ROS  As above but otherwise denies    Physical Exam    GENERAL: Awake and Alert, No Acute Distress  HEENT: AT/NC, PERRL, EOMI, Normal Oropharynx, No Signs of Dehydration  NECK: Normal Inspection, No JVD  CARDIOVASCULAR: RRR, No M/R/G  RESPIRATORY: CTA Bilaterally, No Wheezes, Rales or Rhonchi, Chest Wall Non-tender  ABDOMEN: Soft, non-tender abdomen, Normal Bowel Sounds, No Distention  BACK: No CVA Tenderness  SKIN: Normal Color, Warm, Dry, No Rashes   EXTREMITIES: Non-Tender, Full ROM, No Pedal Edema  NEURO: A&O x 3, Normal Motor and Sensation, Normal Mood and Affect    Nursing Assessment and Vitals Reviewed    Medical Decision  Patient is an 83-year-old female who presented to the emergency room with a chief pain of chronic cough.  She has been seen by her primary care physician for this and given a prescription for  albuterol a few weeks ago but states that she forgets to take it.  She continues to have the cough and today she felt tired after dialysis brought her to be evaluated.  On evaluation patient is very well-appearing and in no acute distress.  Lungs are clear and heart is regular.  Patient is saturating well on room air while at rest.  Workup thus far BUN and creatinine    He has mild hyponatremia and hypochloremia.  BNP is elevated at 488 which is significantly from prior.  Initial troponin is 30 but again patient is without any chest pain or anginal equivalent CBC showed no leukocytosis or anemia and/COVID swabs are negative.  Chest x-ray showed unchanged cardiomegaly with chronic interstitial changes.  On reevaluation patient remained stable.  Will ambulate with a pulse ox to determine level of oxygenation.  Anticipate likely will be discharged home and does not desaturate with antibiotics, steroids, send follow-up with primary care physician.                          No data recorded                Patient History   Past Medical History:   Diagnosis Date    Acute pulmonary edema (CMS/HCC)     Flash pulmonary edema    Chronic systolic (congestive) heart failure (CMS/HCC) 06/26/2022    Chronic systolic heart failure, ACC/AHA stage C    End stage renal disease (CMS/HCC) 06/26/2022    ESRD on dialysis    Metabolic encephalopathy 07/20/2022    Metabolic encephalopathy    Personal history of malignant neoplasm of breast 07/20/2022    History of malignant neoplasm of breast    Personal history of other diseases of the circulatory system     History of hypertension    Personal history of other diseases of the circulatory system     History of pulmonary hypertension    Personal history of other diseases of the musculoskeletal system and connective tissue 06/26/2022    History of chronic back pain    Personal history of other endocrine, nutritional and metabolic disease     History of hyperlipidemia     Past Surgical  History:   Procedure Laterality Date    CT ABDOMEN PELVIS ANGIOGRAM W AND/OR WO IV CONTRAST  3/19/2023    CT ABDOMEN PELVIS ANGIOGRAM W AND/OR WO IV CONTRAST [facility]    CT ABDOMEN PELVIS ANGIOGRAM W AND/OR WO IV CONTRAST  6/7/2023    CT ABDOMEN PELVIS ANGIOGRAM W AND/OR WO IV CONTRAST 6/7/2023 [facility] CT    OTHER SURGICAL HISTORY  10/03/2020    Arteriovenous graft fistula creation procedure    OTHER SURGICAL HISTORY  10/03/2020    Arteriovenous fistula creation procedure    OTHER SURGICAL HISTORY  10/03/2020    Lumpectomy    OTHER SURGICAL HISTORY  10/03/2020    Cataract surgery    OTHER SURGICAL HISTORY  10/03/2020    Tubal ligation bilateral    OTHER SURGICAL HISTORY  10/03/2020    Lung biopsy    OTHER SURGICAL HISTORY  10/03/2020    Mastectomy partial    OTHER SURGICAL HISTORY  10/03/2020    Tonsillectomy     Family History   Problem Relation Name Age of Onset    Other (CVA) Mother      Coronary artery disease Mother      Lymphoma Father      Breast cancer Sister      Breast cancer Daughter       Social History     Tobacco Use    Smoking status: Never    Smokeless tobacco: Never   Vaping Use    Vaping Use: Never used   Substance Use Topics    Alcohol use: Not Currently    Drug use: Not Currently       Physical Exam   ED Triage Vitals [01/30/24 1537]   Temperature Heart Rate Respirations BP   37.9 °C (100.2 °F) 82 18 (!) 137/49      Pulse Ox Temp Source Heart Rate Source Patient Position   (!) 93 % Oral -- --      BP Location FiO2 (%)     -- --       Physical Exam    ED Course & MDM   Diagnoses as of 02/02/24 1110   Bronchitis       Medical Decision Making      Procedure  Procedures     Park Bella MD  02/02/24 1116

## 2024-01-31 NOTE — PROGRESS NOTES
01/31/24 0650   ACS Disability Status   Are you deaf or do you have serious difficulty hearing? N   Are you blind or do you have serious difficulty seeing, even when wearing glasses? N   Because of a physical, mental, or emotional condition, do you have serious difficulty concentrating, remembering, or making decisions? (5 years old or older) Y   Do you have serious difficulty walking or climbing stairs? Y   Do you have serious difficulty dressing or bathing? Y   Because of a physical, mental, or emotional condition, do you have serious difficulty doing errands alone such as visiting the doctor? Y

## 2024-01-31 NOTE — DISCHARGE SUMMARY
Discharge Diagnosis  Acute bronchitis    Issues Requiring Follow-Up  Cough bronchitis    Discharge Meds     Your medication list        ASK your doctor about these medications        Instructions Last Dose Given Next Dose Due   amLODIPine 5 mg tablet  Commonly known as: Norvasc      Take 1 tablet (5 mg) by mouth once daily.       aspirin 81 mg EC tablet           atorvastatin 80 mg tablet  Commonly known as: Lipitor           atorvastatin 80 mg tablet  Commonly known as: Lipitor      Take 1 tablet (80 mg) by mouth once daily.       carvedilol 12.5 mg tablet  Commonly known as: Coreg      Take 1 tablet (12.5 mg) by mouth 2 times a day.       furosemide 40 mg tablet  Commonly known as: Lasix      Take 1 tablet (40 mg) by mouth 2 times a day.       gabapentin 100 mg capsule  Commonly known as: Neurontin      Take 1 capsule (100 mg) by mouth once daily at bedtime.       isosorbide mononitrate ER 30 mg 24 hr tablet  Commonly known as: Imdur      Take 2 tablets (60 mg) by mouth once daily.       lidocaine 5 % patch  Commonly known as: Lidoderm      Place 1 patch over 12 hours on the skin once daily. Apply to painful area 12 hours per day, remove for 12 hours.       magnesium oxide 400 mg (241.3 mg magnesium) tablet  Commonly known as: Mag-Ox           melatonin 3 mg tablet           mirtazapine 7.5 mg tablet  Commonly known as: Remeron      Take 1 tablet (7.5 mg) by mouth once daily at bedtime.       pantoprazole 40 mg EC tablet  Commonly known as: ProtoNix           sevelamer HCl 800 mg tablet  Commonly known as: Renagel      Take 1 tablet (800 mg) by mouth once daily. Before meals       tiotropium 18 mcg inhalation capsule  Commonly known as: Spiriva with HandiHaler      Place 1 capsule (18 mcg) into inhaler and inhale once daily at bedtime.                Test Results Pending At Discharge  Pending Labs       Order Current Status    Blood Culture Preliminary result    Blood Culture Preliminary result            Hospital  Course   Principal Problem:    Acute bronchitis  Active Problems:    ESRD (end stage renal disease) on dialysis (CMS/[facility])    Essential hypertension    Hyperlipidemia    PAD (peripheral artery disease) (CMS/[facility])    Chronic combined systolic and diastolic congestive heart failure (CMS/[facility])  -CBC: no leukocytosis, no acute anemia, no thrombocytopenia  -CMP: no acute electrolyte abnormalities, BUN/creatinine consistent with ESRD, alk phos elevated but stable with prior elevations, otherwise no acute liver dysfunction appreciated  -COVID, Flu, RSV negative  -CXR: mild stable cardiomegaly.  Chronic interstitial changes. No acute processes.    -HS MICHAEL 30 --> in the setting of ESRD, no CP, no acute EKG changes, not ACS  -: in setting of known HFrEF and ESRD, but significantly improved from prior elevations, no evidence of acute CHF exam  -clinically presentation appears consistent with bronchitis   -she did have temp of 100.2 in the ER --> she does not appear septic, no leukocytosis, BP and HR stable, but will send lactate and Bcx x 2   -continue with prednisone and azithromycin for now  -BPH, duonebs PRN, RT consult  -consider pulm referral as OP  -repeat labs in AM   - Hyponatremia will repeat this after noon  -resume home meds as appropriate  -anticipate discharge tomorrow if remains afebrile and symptoms continue to improve  -if patient needs to stay additional night, will need nephrology consult for dialysis on Thursday  -GI ppx: Protonix, bowel regimen  -VTE ppx: Heparin   Labs/Testing reviewed     Interdisciplinary team rounding completed with hospitalist, nurse, TCC     NP discussed plan and lab/testing results with Dr. Sanches     Repeat BMP if Na is better she can go home on azithromycin for bronchitis  No further fevers while here, will trend blood cultures   Corrected Na with calculation for hyperglycemia is 130     * 45 minutes total spent on patient's care today; >50% time spent on counseling/coordination  of care       Pertinent Physical Exam At Time of Discharge  Physical Exam  General: Vital signs stable, Pt is alert, no acute distress  Eyes: Conjunctiva normal, PERRL, EOMs intact  HENMT: Normocephalic, atraumatic, external ears and nose normal, no scars or masses.  No mastoid tenderness. Trachea is midline. No meningeal signs, negative Kernig and Brudzinski, moves neck freely.  No sinus tenderness  Resp: Respiratory effort is normal, no retractions, no stridor. Lungs CTA, no wheezes or rhonchi  CV: Heart is regular rate and rhythm.   Skin: No evidence of trauma, skin is warm and dry. No rashes, lesions or ulcers.  Skel: full range of motion of upper and lower extremities.   Neuro: Normal gait, CN II-XII intact, no motor or sensory changes.  Psych: Alert and oriented ×3, judgment is appropriate, normal mood and affect    Outpatient Follow-Up  No future appointments.      Katlyn Maradiaga, APRN-CNP

## 2024-01-31 NOTE — H&P
"History Of Present Illness  Alyce Dougherty is a 83 y.o. female  who presented with c/o persistent cough time 3-4 weeks. Reports intermittently productive with white sputum.  Denies increased sputum production.  She was previously seen by her PCP (Dr. Corado) and was prescribed an inhaler.  Reports no improvement with inhaler.   She had temp of 100.2 in the ER, but denies prior fevers before today.  Denies HA, nausea, vomiting, CP, abdominal pain, changes in bowel habits, syncope. Endorses mild SOB with coughing episodes.  Denies prior history of COPD.  Reports she was told in the past she had \"sarcoidosis, but the type she doesn't need to take medication for.\"   She does make a small amount of urine, denies changes in urinary habits.  She tolerated full dialysis session today.  She due for dialysis on Thursday.  She lives at home with her son and daughter in law.      Past Medical History  Past Medical History:   Diagnosis Date    Chronic combined systolic and diastolic congestive heart failure (CMS/HCC) 01/31/2024    -ECHO 8/1/2022:  The left ventricular systolic function is moderately to severely decreased, with an estimated ejection fraction of 30-35%. There is global hypokinesis of the left ventricle with minor regional variations. The left ventricular cavity size is normal. Spectral Doppler shows a pseudonormal pattern of left ventricular diastolic filling.     Chronic systolic (congestive) heart failure (CMS/HCC) 06/26/2022    End stage renal disease (CMS/[facility]) 06/26/2022    Essential hypertension 03/14/2023    Hyperlipidemia 03/14/2023    Metabolic encephalopathy 07/20/2022    PAD (peripheral artery disease) (CMS/HCC) 03/14/2023       Surgical History  Past Surgical History:   Procedure Laterality Date    CT ABDOMEN PELVIS ANGIOGRAM W AND/OR WO IV CONTRAST  3/19/2023    CT ABDOMEN PELVIS ANGIOGRAM W AND/OR WO IV CONTRAST [facility]    CT ABDOMEN PELVIS ANGIOGRAM W AND/OR WO IV CONTRAST  6/7/2023    CT ABDOMEN " PELVIS ANGIOGRAM W AND/OR WO IV CONTRAST 6/7/2023 [facility]    OTHER SURGICAL HISTORY  10/03/2020    Arteriovenous graft fistula creation procedure    OTHER SURGICAL HISTORY  10/03/2020    Arteriovenous fistula creation procedure    OTHER SURGICAL HISTORY  10/03/2020    Lumpectomy    OTHER SURGICAL HISTORY  10/03/2020    Cataract surgery    OTHER SURGICAL HISTORY  10/03/2020    Tubal ligation bilateral    OTHER SURGICAL HISTORY  10/03/2020    Lung biopsy    OTHER SURGICAL HISTORY  10/03/2020    Mastectomy partial    OTHER SURGICAL HISTORY  10/03/2020    Tonsillectomy       Social History  Social History     Socioeconomic History    Marital status:      Spouse name: None    Number of children: None    Years of education: None    Highest education level: None   Occupational History    None   Tobacco Use    Smoking status: Never    Smokeless tobacco: Never   Vaping Use    Vaping Use: Never used   Substance and Sexual Activity    Alcohol use: Not Currently    Drug use: Not Currently    Sexual activity: None   Other Topics Concern    None   Social History Narrative    None     Social Determinants of Health     Financial Resource Strain: Not on file   Food Insecurity: Not on file   Transportation Needs: No Transportation Needs (12/7/2023)    OASIS : Transportation     Lack of Transportation (Medical): No     Lack of Transportation (Non-Medical): No     Patient Unable or Declines to Respond: No   Physical Activity: Not on file   Stress: Not on file   Social Connections: Feeling Socially Integrated (12/7/2023)    OASIS : Social Isolation     Frequency of experiencing loneliness or isolation: Never   Intimate Partner Violence: Not on file   Housing Stability: Not on file        Family History  Family History   Problem Relation Name Age of Onset    Other (CVA) Mother      Coronary artery disease Mother      Lymphoma Father      Breast cancer Sister      Breast cancer Daughter          Allergies  Allergies as of  01/30/2024 - Reviewed 01/30/2024   Allergen Reaction Noted    Lisinopril Angioedema 03/14/2023    Hydralazine Unknown, Itching, and Other 09/01/2020    Metoprolol Itching 03/14/2023    Hydrochlorothiazide Itching, Rash, and Unknown 08/21/2010        Review of Systems  Review of Systems   Constitutional:  Positive for fever. Negative for chills.   HENT: Negative.     Eyes: Negative.    Respiratory:  Positive for cough and shortness of breath.    Cardiovascular: Negative.  Negative for chest pain and leg swelling.   Gastrointestinal: Negative.    Endocrine: Negative.    Genitourinary: Negative.    Musculoskeletal: Negative.    Skin: Negative.    Allergic/Immunologic: Negative.    Neurological: Negative.    Hematological: Negative.    Psychiatric/Behavioral: Negative.     All other systems reviewed and are negative.      Relevant results reviewed   PROVIDER notes  Nursing notes  MEDS:  Current Facility-Administered Medications   Medication Dose Route Frequency Provider Last Rate Last Admin    acetaminophen (Tylenol) tablet 975 mg  975 mg oral q6h PRN Myriam Ramos PA-C        albuterol 2.5 mg /3 mL (0.083 %) nebulizer solution 2.5 mg  2.5 mg nebulization q4h PRN Myriam Ramos PA-C        amLODIPine (Norvasc) tablet 5 mg  5 mg oral Daily Myriam Ramos PA-C        aspirin EC tablet 81 mg  81 mg oral Daily Myriam Ramos PA-C        atorvastatin (Lipitor) tablet 80 mg  80 mg oral Daily Myriam Ramos PA-C        azithromycin (Zithromax) tablet 250 mg  250 mg oral q24h [facility] Myriam Ramos PA-C        budesonide (Pulmicort) 0.5 mg/2 mL nebulizer solution 0.5 mg  0.5 mg nebulization BID Myriam Ramos PA-C   0.5 mg at 01/30/24 2218    carvedilol (Coreg) tablet 12.5 mg  12.5 mg oral BID with meals Myriam Ramos PA-C        docusate sodium (Colace) capsule 100 mg  100 mg oral BID PRN Myriam Ramos PA-C        gabapentin (Neurontin) capsule 100 mg  100 mg oral Nightly Myriam Ramos PA-C   100 mg at  01/31/24 0100    heparin (porcine) injection 5,000 Units  5,000 Units subcutaneous q8h Myriam Ramos PA-C   5,000 Units at 01/30/24 2250    ipratropium-albuteroL (Duo-Neb) 0.5-2.5 mg/3 mL nebulizer solution 3 mL  3 mL nebulization TID Nory Sanches MD        isosorbide mononitrate ER (Imdur) 24 hr tablet 60 mg  60 mg oral Daily Myriam Ramos PA-C        melatonin tablet 3 mg  3 mg oral Daily Myriam Ramos PA-C        mirtazapine (Remeron) tablet 7.5 mg  7.5 mg oral Nightly Myriam Ramos PA-C   7.5 mg at 01/31/24 0100    pantoprazole (ProtoNix) EC tablet 40 mg  40 mg oral Daily before breakfast Myriam Ramos PA-C        Or    pantoprazole (ProtoNix) injection 40 mg  40 mg intravenous Daily before breakfast Myriam Ramos PA-C        predniSONE (Deltasone) tablet 40 mg  40 mg oral Daily Myriam Ramos PA-C        sevelamer carbonate (Renvela) tablet 800 mg  800 mg oral TID with meals Myriam Ramos PA-C        tiotropium (Spiriva Respimat) 2.5 mcg/actuation inhaler 2 puff  2 Inhalation inhalation Daily Myriam Ramos PA-C         Current Outpatient Medications   Medication Sig Dispense Refill    amLODIPine (Norvasc) 5 mg tablet Take 1 tablet (5 mg) by mouth once daily. 60 tablet 2    aspirin 81 mg EC tablet Take 1 tablet (81 mg) by mouth once daily.      atorvastatin (Lipitor) 80 mg tablet Take 1 tablet (80 mg) by mouth once daily. 30 tablet 2    carvedilol (Coreg) 12.5 mg tablet Take 1 tablet (12.5 mg) by mouth 2 times a day. 120 tablet 2    furosemide (Lasix) 40 mg tablet Take 1 tablet (40 mg) by mouth 2 times a day. 60 tablet 3    gabapentin (Neurontin) 100 mg capsule Take 1 capsule (100 mg) by mouth once daily at bedtime. 90 capsule 0    ipratropium-albuteroL (Duo-Neb) 0.5-2.5 mg/3 mL nebulizer solution Take 3 mL by nebulization every 4 hours if needed.      isosorbide mononitrate ER (Imdur) 30 mg 24 hr tablet Take 2 tablets (60 mg) by mouth once daily. 30 tablet 0    lidocaine (Lidoderm)  5 % patch Place 1 patch over 12 hours on the skin once daily. Apply to painful area 12 hours per day, remove for 12 hours. 30 patch 11    magnesium oxide (Mag-Ox) 400 mg (241.3 mg magnesium) tablet Take 1 tablet (400 mg) by mouth once daily.      melatonin tablet Take 1 tablet (3 mg) by mouth once daily at bedtime.      mirtazapine (Remeron) 7.5 mg tablet Take 1 tablet (7.5 mg) by mouth once daily at bedtime. 90 tablet 0    pantoprazole (ProtoNix) 40 mg EC tablet Take 1 tablet (40 mg) by mouth once daily.      sevelamer (Renagel) 800 mg tablet Take 1 tablet (800 mg) by mouth once daily. Before meals (Patient taking differently: Take 1 tablet (800 mg) by mouth 3 times a day with meals. Before meals) 30 tablet 3    tiotropium (Spiriva with HandiHaler) 18 mcg inhalation capsule Place 1 capsule (18 mcg) into inhaler and inhale once daily at bedtime. 30 capsule 11      LABS:  Results for orders placed or performed during the hospital encounter of 01/30/24 (from the past 24 hour(s))   CBC and Auto Differential   Result Value Ref Range    WBC 10.7 4.4 - 11.3 x10*3/uL    nRBC 0.0 0.0 - 0.0 /100 WBCs    RBC 4.65 4.00 - 5.20 x10*6/uL    Hemoglobin 12.8 12.0 - 16.0 g/dL    Hematocrit 42.2 36.0 - 46.0 %    MCV 91 80 - 100 fL    MCH 27.5 26.0 - 34.0 pg    MCHC 30.3 (L) 32.0 - 36.0 g/dL    RDW 16.2 (H) 11.5 - 14.5 %    Platelets 182 150 - 450 x10*3/uL    Neutrophils % 83.3 40.0 - 80.0 %    Immature Granulocytes %, Automated 0.4 0.0 - 0.9 %    Lymphocytes % 6.8 13.0 - 44.0 %    Monocytes % 7.6 2.0 - 10.0 %    Eosinophils % 1.6 0.0 - 6.0 %    Basophils % 0.3 0.0 - 2.0 %    Neutrophils Absolute 8.90 (H) 1.60 - 5.50 x10*3/uL    Immature Granulocytes Absolute, Automated 0.04 0.00 - 0.50 x10*3/uL    Lymphocytes Absolute 0.73 (L) 0.80 - 3.00 x10*3/uL    Monocytes Absolute 0.81 (H) 0.05 - 0.80 x10*3/uL    Eosinophils Absolute 0.17 0.00 - 0.40 x10*3/uL    Basophils Absolute 0.03 0.00 - 0.10 x10*3/uL   Comprehensive metabolic panel  "  Result Value Ref Range    Glucose 97 74 - 99 mg/dL    Sodium 131 (L) 136 - 145 mmol/L    Potassium 4.8 3.5 - 5.3 mmol/L    Chloride 88 (L) 98 - 107 mmol/L    Bicarbonate 29 21 - 32 mmol/L    Anion Gap 19 10 - 20 mmol/L    Urea Nitrogen 25 (H) 6 - 23 mg/dL    Creatinine 4.70 (H) 0.50 - 1.05 mg/dL    eGFR 9 (L) >60 mL/min/1.73m*2    Calcium 9.3 8.6 - 10.3 mg/dL    Albumin 3.7 3.4 - 5.0 g/dL    Alkaline Phosphatase 285 (H) 33 - 136 U/L    Total Protein 8.6 (H) 6.4 - 8.2 g/dL    AST 21 9 - 39 U/L    Bilirubin, Total 0.8 0.0 - 1.2 mg/dL    ALT 22 7 - 45 U/L   Troponin I, High Sensitivity   Result Value Ref Range    Troponin I, High Sensitivity 30 (H) 0 - 13 ng/L   B-Type Natriuretic Peptide   Result Value Ref Range     (H) 0 - 99 pg/mL   Influenza A, and B PCR   Result Value Ref Range    Flu A Result Not Detected Not Detected    Flu B Result Not Detected Not Detected   SARS-CoV-2 RT PCR   Result Value Ref Range    Coronavirus 2019, PCR Not Detected Not Detected      IMAGING:  XR chest 2 views   Final Result   Mild unchanged cardiomegaly with chronic interstitial changes.   No acute process.   Signed by Mil Ford MD             PHYSICAL EXAM  /58   Pulse 76   Temp 37.9 °C (100.2 °F) (Oral)   Resp (!) 22   Ht 1.702 m (5' 7\")   Wt 54.4 kg (120 lb)   SpO2 94%   BMI 18.79 kg/m²   PHYSICAL EXAM:  GENERAL: Alert, NAD, cooperative  SKIN: Warm and dry.  No suspicious lesions or rashes.  HEENT:  NCAT, PERRLA, EOMI, nonicteric sclera, MMM, neck supple, trachea midline  LUNGS: No respiratory distress, +cough, diminished at bases, no wheezing, rhonchi, or rales appreciated  CARDS: RRR  GI: Soft, NTND, BS+, no rebound, no guarding   MS/Extremities: WWP, no significant pitting edema, distal pulses intact, moves all extremities, LUE AV fistula +bruit/+thrill   NEURO: A&Ox3, grossly nonfocal exam, speech fluent, follows commands, answers questions appropriately  PSYCH: mood and behavior " appropriate    Assessment/Plan:   Principal Problem:    Acute bronchitis  Active Problems:    ESRD (end stage renal disease) on dialysis (CMS/[facility])    Essential hypertension    Hyperlipidemia    PAD (peripheral artery disease) (CMS/[facility])    Chronic combined systolic and diastolic congestive heart failure (CMS/[facility])  -CBC: no leukocytosis, no acute anemia, no thrombocytopenia  -CMP: no acute electrolyte abnormalities, BUN/creatinine consistent with ESRD, alk phos elevated but stable with prior elevations, otherwise no acute liver dysfunction appreciated  -COVID, Flu, RSV negative  -CXR: mild stable cardiomegaly.  Chronic interstitial changes. No acute processes.    -HS MICHAEL 30 --> in the setting of ESRD, no CP, no acute EKG changes, not ACS  -: in setting of known HFrEF and ESRD, but significantly improved from prior elevations, no evidence of acute CHF exam  -clinically presentation appears consistent with bronchitis   -she did have temp of 100.2 in the ER --> she does not appear septic, no leukocytosis, BP and HR stable, but will send lactate and Bcx x 2   -continue with prednisone and azithromycin for now  -BPH, duonebs PRN, RT consult  -consider pulm referral as OP  -repeat labs in AM   -resume home meds as appropriate  -anticipate discharge tomorrow if remains afebrile and symptoms continue to improve  -if patient needs to stay additional night, will need nephrology consult for dialysis on Thursday  -GI ppx: Protonix, bowel regimen  -VTE ppx: Heparin       Total time spent [including but not limited to]: Obtaining and reviewing patient medical records/history, obtaining a separate history,  examining and assessing the patient, providing  and education, placing pertinent orders for labs/tests/medications,  communicating with the patient/family/health team, documenting in the patient's EMR/formulation of this note, independently interpreting results and data, coordinating care:   55 minutes, with greater  than 50% spent in personal discussion with patient and/or family    Myriam Ramos PA-C

## 2024-01-31 NOTE — PROGRESS NOTES
Transitional Care Coordination Progress Note:  Plan per Medical/Surgical team: treatment of bronchitis with inhalers, prednisone, Duoneb, PO ATB  Status: Observation  Payor source: medicare A, MMO [employer] HMO  Discharge disposition: home with son Calin & daughter in law  Potential Barriers: Hemodialysis [facility] DTT T/TH/SAT @ 0800, , renal 45/6.21  ADOD: 2/1/2024  FARTUN Paez RN, BSN Transitional Care Coordinator ED# [phone]      01/31/24 0650   Discharge Planning   Living Arrangements Children   Support Systems Children   Assistance Needed Dialysis [facility] DTT T/TH/SAT @ 0800   Type of Residence Private residence   Number of Stairs to Enter Residence 4   Number of Stairs Within Residence 12   Home or Post Acute Services None   Patient expects to be discharged to: home with son Calin & daughter in law   Does the patient need discharge transport arranged? Yes   RoundTrip coordination needed? Yes   Has discharge transport been arranged? No   Financial Resource Strain   How hard is it for you to pay for the very basics like food, housing, medical care, and heating? Not hard   Housing Stability   In the last 12 months, was there a time when you were not able to pay the mortgage or rent on time? N   In the last 12 months, how many places have you lived? 1   In the last 12 months, was there a time when you did not have a steady place to sleep or slept in a shelter (including now)? N   Transportation Needs   In the past 12 months, has lack of transportation kept you from medical appointments or from getting medications? no   In the past 12 months, has lack of transportation kept you from meetings, work, or from getting things needed for daily living? No

## 2024-01-31 NOTE — PROGRESS NOTES
Emergency Medicine Transition of Care Note.    I received Alyce Dougherty in signout from Dr. Zuniga.  Please see the previous ED provider note for all HPI, PE and MDM up to the time of signout. This is in addition to the primary record.    In brief Alyce Dougherty is an 83 y.o. female presenting for   Chief Complaint   Patient presents with    Cough     Pt arrived from home with complaints of coughing. Pt is on dialysis had dialysis treatment. Pt has had a cough for 3 weeks. Pt was seen for it. Pt was on a medication for it but has not helped. Pt has no other complaints. Pt has no sob. Pt states coughing gets worse when laying down.     At the time of signout we were awaiting: Reevaluation ambulation.    Diagnoses as of 01/30/24 2148   Bronchitis       Medical Decision Making  Patient was found to 90% with walking.  Family is agreeable to stay for observation.  Patient did have a borderline fever on arrival.  No obvious pneumonia on x-ray.  Will treat this as a bronchitis per plan from previous provider steroids and azithromycin orders.    Final diagnoses:   [J40] Bronchitis           Procedure  Procedures    Celestine Rojas MD

## 2024-01-31 NOTE — PROGRESS NOTES
"Alyce Dougherty is a 83 y.o. female on day 0 of admission presenting with Acute bronchitis.    Subjective   Patient feeling much better no longer having cough or fevers.  Workup negative.  Sodium was slightly low we will recheck this afternoon to see if it has gone up.  Patient denies chest pain or shortness of breath.     ROS  Gen: No fatigue, fever, sweats.  Head: No headache, trauma.  Eyes: No vision loss, double vision, drainage, eye pain.  ENT: No hearing changes, pain, epistaxis, congestion  Cardiac: No chest pain  Pulmonary: No shortness of breath,  pleuritic pain,   Heme/lymph: No swollen glands  GI: No abdominal pain, nausea, vomiting, diarrhea  : No  dysuria, frequency, urgency, hematuria  Musculoskeletal: No limb pain, joint pain, back pain, joint swelling or stiffness.  Skin: No rashes, pruritus, lumps, lesions.  Neuro: No Numbness, tingling, or weakness.  Psych: No  anxiety     Review of systems is otherwise negative unless stated above or in history of present illness.    Objective     Physical Exam  General: Vital signs stable, Pt is alert, no acute distress  Eyes: Conjunctiva normal, PERRL, EOMs intact  HENMT: Normocephalic, atraumatic, external ears and nose normal, no scars or masses.  No mastoid tenderness. Trachea is midline. No meningeal signs, negative Kernig and Brudzinski, moves neck freely.  No sinus tenderness  Resp: Respiratory effort is normal, no retractions, no stridor. Lungs CTA, no wheezes or rhonchi  CV: Heart is regular rate and rhythm.   Skin: No evidence of trauma, skin is warm and dry. No rashes, lesions or ulcers.  Skel: full range of motion of upper and lower extremities.   Neuro: Normal gait, CN II-XII intact, no motor or sensory changes.  Psych: Alert and oriented ×3, judgment is appropriate, normal mood and affect     Last Recorded Vitals  Blood pressure 114/51, pulse 65, temperature 36.8 °C (98.2 °F), temperature source Temporal, resp. rate 18, height 1.702 m (5' 7.01\"), " weight 53 kg (116 lb 13.5 oz), SpO2 95 %.  Intake/Output last 3 Shifts:  No intake/output data recorded.    Relevant Results  Results for orders placed or performed during the hospital encounter of 01/30/24 (from the past 24 hour(s))   CBC and Auto Differential   Result Value Ref Range    WBC 10.7 4.4 - 11.3 x10*3/uL    nRBC 0.0 0.0 - 0.0 /100 WBCs    RBC 4.65 4.00 - 5.20 x10*6/uL    Hemoglobin 12.8 12.0 - 16.0 g/dL    Hematocrit 42.2 36.0 - 46.0 %    MCV 91 80 - 100 fL    MCH 27.5 26.0 - 34.0 pg    MCHC 30.3 (L) 32.0 - 36.0 g/dL    RDW 16.2 (H) 11.5 - 14.5 %    Platelets 182 150 - 450 x10*3/uL    Neutrophils % 83.3 40.0 - 80.0 %    Immature Granulocytes %, Automated 0.4 0.0 - 0.9 %    Lymphocytes % 6.8 13.0 - 44.0 %    Monocytes % 7.6 2.0 - 10.0 %    Eosinophils % 1.6 0.0 - 6.0 %    Basophils % 0.3 0.0 - 2.0 %    Neutrophils Absolute 8.90 (H) 1.60 - 5.50 x10*3/uL    Immature Granulocytes Absolute, Automated 0.04 0.00 - 0.50 x10*3/uL    Lymphocytes Absolute 0.73 (L) 0.80 - 3.00 x10*3/uL    Monocytes Absolute 0.81 (H) 0.05 - 0.80 x10*3/uL    Eosinophils Absolute 0.17 0.00 - 0.40 x10*3/uL    Basophils Absolute 0.03 0.00 - 0.10 x10*3/uL   Comprehensive metabolic panel   Result Value Ref Range    Glucose 97 74 - 99 mg/dL    Sodium 131 (L) 136 - 145 mmol/L    Potassium 4.8 3.5 - 5.3 mmol/L    Chloride 88 (L) 98 - 107 mmol/L    Bicarbonate 29 21 - 32 mmol/L    Anion Gap 19 10 - 20 mmol/L    Urea Nitrogen 25 (H) 6 - 23 mg/dL    Creatinine 4.70 (H) 0.50 - 1.05 mg/dL    eGFR 9 (L) >60 mL/min/1.73m*2    Calcium 9.3 8.6 - 10.3 mg/dL    Albumin 3.7 3.4 - 5.0 g/dL    Alkaline Phosphatase 285 (H) 33 - 136 U/L    Total Protein 8.6 (H) 6.4 - 8.2 g/dL    AST 21 9 - 39 U/L    Bilirubin, Total 0.8 0.0 - 1.2 mg/dL    ALT 22 7 - 45 U/L   Troponin I, High Sensitivity   Result Value Ref Range    Troponin I, High Sensitivity 30 (H) 0 - 13 ng/L   B-Type Natriuretic Peptide   Result Value Ref Range     (H) 0 - 99 pg/mL   Influenza A,  and B PCR   Result Value Ref Range    Flu A Result Not Detected Not Detected    Flu B Result Not Detected Not Detected   SARS-CoV-2 RT PCR   Result Value Ref Range    Coronavirus 2019, PCR Not Detected Not Detected   Blood Culture    Specimen: Peripheral Venipuncture; Blood culture   Result Value Ref Range    Blood Culture Loaded on Instrument - Culture in progress    Blood Culture    Specimen: Peripheral Venipuncture; Blood culture   Result Value Ref Range    Blood Culture Loaded on Instrument - Culture in progress    Lactate   Result Value Ref Range    Lactate 0.9 0.4 - 2.0 mmol/L   CBC   Result Value Ref Range    WBC 9.8 4.4 - 11.3 x10*3/uL    nRBC 0.0 0.0 - 0.0 /100 WBCs    RBC 4.39 4.00 - 5.20 x10*6/uL    Hemoglobin 12.3 12.0 - 16.0 g/dL    Hematocrit 40.0 36.0 - 46.0 %    MCV 91 80 - 100 fL    MCH 28.0 26.0 - 34.0 pg    MCHC 30.8 (L) 32.0 - 36.0 g/dL    RDW 16.1 (H) 11.5 - 14.5 %    Platelets 178 150 - 450 x10*3/uL   Basic metabolic panel   Result Value Ref Range    Glucose 155 (H) 74 - 99 mg/dL    Sodium 127 (L) 136 - 145 mmol/L    Potassium 4.5 3.5 - 5.3 mmol/L    Chloride 89 (L) 98 - 107 mmol/L    Bicarbonate 24 21 - 32 mmol/L    Anion Gap 19 10 - 20 mmol/L    Urea Nitrogen 45 (H) 6 - 23 mg/dL    Creatinine 6.21 (H) 0.50 - 1.05 mg/dL    eGFR 6 (L) >60 mL/min/1.73m*2    Calcium 8.8 8.6 - 10.3 mg/dL       Imaging Results  XR chest 2 views    Result Date: 1/30/2024  STUDY: Chest Radiographs;  1/30/2024 at 4:00 PM. INDICATION: Cough. COMPARISON: XR chest 1/30/2023, 1/26/2023, 1/12/2023. ACCESSION NUMBER(S): [id number] ORDERING CLINICIAN: SHELLI SANTOS TECHNIQUE:  Frontal and lateral chest. FINDINGS: CARDIOMEDIASTINAL SILHOUETTE: Cardiomediastinal silhouette is mildly enlarged but stable.  LUNGS: Mild chronic interstitial changes noted.  ABDOMEN: No remarkable upper abdominal findings.  BONES: No acute osseous changes.    Mild unchanged cardiomegaly with chronic interstitial changes. No acute process. Signed  by Mil Ford MD      Medications:  amLODIPine, 5 mg, oral, Daily  aspirin, 81 mg, oral, Daily  atorvastatin, 80 mg, oral, Daily  azithromycin, 250 mg, oral, q24h CLEMENT  budesonide, 0.5 mg, nebulization, BID  carvedilol, 12.5 mg, oral, BID with meals  gabapentin, 100 mg, oral, Nightly  heparin (porcine), 5,000 Units, subcutaneous, q8h  ipratropium-albuteroL, 3 mL, nebulization, TID  isosorbide mononitrate ER, 60 mg, oral, Daily  melatonin, 3 mg, oral, Daily  mirtazapine, 7.5 mg, oral, Nightly  pantoprazole, 40 mg, oral, Daily before breakfast   Or  pantoprazole, 40 mg, intravenous, Daily before breakfast  predniSONE, 40 mg, oral, Daily  sevelamer carbonate, 800 mg, oral, TID with meals  tiotropium, 2 Inhalation, inhalation, Daily       PRN medications: acetaminophen, albuterol, docusate sodium     Assessment/Plan   Principal Problem:    Acute bronchitis  Active Problems:    ESRD (end stage renal disease) on dialysis (CMS/[facility])    Essential hypertension    Hyperlipidemia    PAD (peripheral artery disease) (CMS/[facility])    Chronic combined systolic and diastolic congestive heart failure (CMS/[facility])  -CBC: no leukocytosis, no acute anemia, no thrombocytopenia  -CMP: no acute electrolyte abnormalities, BUN/creatinine consistent with ESRD, alk phos elevated but stable with prior elevations, otherwise no acute liver dysfunction appreciated  -COVID, Flu, RSV negative  -CXR: mild stable cardiomegaly.  Chronic interstitial changes. No acute processes.    -HS MICHAEL 30 --> in the setting of ESRD, no CP, no acute EKG changes, not ACS  -: in setting of known HFrEF and ESRD, but significantly improved from prior elevations, no evidence of acute CHF exam  -clinically presentation appears consistent with bronchitis   -she did have temp of 100.2 in the ER --> she does not appear septic, no leukocytosis, BP and HR stable, but will send lactate and Bcx x 2   -continue with prednisone and azithromycin for now  -BPH, duonebs PRN, RT  consult  -consider pulm referral as OP  -repeat labs in AM   - Hyponatremia will repeat this after noon  -resume home meds as appropriate  -anticipate discharge tomorrow if remains afebrile and symptoms continue to improve  -if patient needs to stay additional night, will need nephrology consult for dialysis on Thursday  -GI ppx: Protonix, bowel regimen  -VTE ppx: Heparin   Labs/Testing reviewed    Interdisciplinary team rounding completed with hospitalist, nurse, TCC    NP discussed plan and lab/testing results with Dr. Sanches    Repeat BMP if Na is better she can go home on azithromycin for bronchitis  No further fevers while here, will trend blood cultures     * 45 minutes total spent on patient's care today; >50% time spent on counseling/coordination of care    Katlyn Maradiaga, APRN-CNP

## 2024-01-31 NOTE — PROGRESS NOTES
home with son Calin & daughter in law     01/31/24 0649   Current Planned Discharge Disposition   Current Planned Discharge Disposition Home

## 2024-01-31 NOTE — PROGRESS NOTES
Pharmacy Medication History Review    Alyce Dougherty is a 83 y.o. female admitted for Acute bronchitis. Pharmacy reviewed the patient's [id number] medications and allergies for accuracy.    The list below reflectives the updated PTA list. Please review each medication in order reconciliation for additional clarification and justification.  Prior to Admission Medications   Prescriptions Last Dose Informant     amLODIPine (Norvasc) 5 mg tablet 1/30/2024      Sig: Take 1 tablet (5 mg) by mouth once daily.   aspirin 81 mg EC tablet 1/30/2024      Sig: Take 1 tablet (81 mg) by mouth once daily.             atorvastatin (Lipitor) 80 mg tablet 1/29/2024      Sig: Take 1 tablet (80 mg) by mouth once daily at bedtime.   carvedilol (Coreg) 12.5 mg tablet       Sig: Take 1 tablet (12.5 mg) by mouth 2 times a day.   furosemide (Lasix) 40 mg tablet 1/30/2024      Sig: Take 1 tablet (40 mg) by mouth 2 times a day.   gabapentin (Neurontin) 100 mg capsule       Sig: Take 1 capsule (100 mg) by mouth once daily at bedtime.   isosorbide mononitrate ER (Imdur) 30 mg 24 hr tablet 1/30/2024      Sig: Take 2 tablets (60 mg) by mouth once daily.                magnesium oxide (Mag-Ox) 400 mg (241.3 mg magnesium) tablet 1/30/2024      Sig: Take 1 tablet (400 mg) by mouth once daily.   melatonin tablet       Sig: Take 1 tablet (3 mg) by mouth as needed at bedtime.   mirtazapine (Remeron) 7.5 mg tablet 1/29/2024      Sig: Take 1 tablet (7.5 mg) by mouth once daily at bedtime.   pantoprazole (ProtoNix) 40 mg EC tablet 1/30/2024      Sig: Take 1 tablet (40 mg) by mouth once daily.   sevelamer (Renagel) 800 mg tablet 1/30/2024      Sig: Take 1 tablet (800 mg) by mouth once daily. Before meals   Patient taking differently: Take 1 tablet (800 mg) by mouth 3 times a day with meals. Before meals   tiotropium (Spiriva with HandiHaler) 18 mcg inhalation capsule Past Week      Sig: Place 1 capsule (18 mcg) into inhaler and inhale once daily  at bedtime.      Facility-Administered Medications: None          The list below reflectives the updated allergy list. Please review each documented allergy for additional clarification and justification.  Allergies  Reviewed by Vikash Freeman MD on 1/30/2024        Severity Reactions Comments    Lisinopril High Angioedema     Hydralazine Medium Unknown, Itching, Other     Metoprolol Not Specified Itching     Hydrochlorothiazide Low Itching, Rash, Unknown Feels too tired and voids to often on this medication and refuses to use.            Below are additional concerns with the patient's PTA list.  Patient verified all medications and doses.    Consuelo Hedrick CPhT

## 2024-02-01 ENCOUNTER — PATIENT OUTREACH (OUTPATIENT)
Dept: CARE COORDINATION | Facility: CLINIC | Age: 84
End: 2024-02-01
Payer: COMMERCIAL

## 2024-02-01 ENCOUNTER — DOCUMENTATION (OUTPATIENT)
Dept: PRIMARY CARE | Facility: CLINIC | Age: 84
End: 2024-02-01
Payer: COMMERCIAL

## 2024-02-01 NOTE — PROGRESS NOTES
Discharge Facility:[facility]  Discharge Diagnosis:Acute Bronchitis  Admission Date:01/31/24  Discharge Date: 01/31/24    PCP Appointment Date:none available sent to pcp office  Specialist Appointment Date:   Hospital Encounter and Summary: Linked  See discharge assessment below for further details  Engagement  Call Start Time: 0840 (2/1/2024  8:40 AM)    Medications  Medications reviewed with patient/caregiver?: Yes (caregiver has questions for her pcp about her meds he will address with pcp) (2/1/2024  8:40 AM)  Is the patient having any side effects they believe may be caused by any medication additions or changes?: No (2/1/2024  8:40 AM)  Does the patient have all medications ordered at discharge?: Yes (2/1/2024  8:40 AM)  Is the patient taking all medications as directed (includes completed medication regime)?: Yes (2/1/2024  8:40 AM)    Appointments  Does the patient have a primary care provider?: Yes (2/1/2024  8:40 AM)  Care Management Interventions: Advised patient to make appointment (2/1/2024  8:40 AM)    Self Management  Has home health visited the patient within 72 hours of discharge?: No (2/1/2024  8:40 AM)  Has all Durable Medical Equipment (DME) been delivered?: No (2/1/2024  8:40 AM)    Patient Teaching  Does the patient have access to their discharge instructions?: Yes (2/1/2024  8:40 AM)  Care Management Interventions: Reviewed instructions with patient (2/1/2024  8:40 AM)  What is the patient's perception of their health status since discharge?: Improving (per caregiver) (2/1/2024  8:40 AM)    Wrap Up  Call End Time: 0850 (2/1/2024  8:40 AM)

## 2024-02-03 ENCOUNTER — APPOINTMENT (OUTPATIENT)
Dept: CARDIOLOGY | Facility: HOSPITAL | Age: 84
DRG: 177 | End: 2024-02-03
Payer: MEDICARE

## 2024-02-03 ENCOUNTER — HOSPITAL ENCOUNTER (INPATIENT)
Facility: HOSPITAL | Age: 84
LOS: 2 days | Discharge: HOME | DRG: 177 | End: 2024-02-05
Attending: EMERGENCY MEDICINE | Admitting: INTERNAL MEDICINE
Payer: MEDICARE

## 2024-02-03 ENCOUNTER — APPOINTMENT (OUTPATIENT)
Dept: RADIOLOGY | Facility: HOSPITAL | Age: 84
DRG: 177 | End: 2024-02-03
Payer: MEDICARE

## 2024-02-03 DIAGNOSIS — J18.9 PNEUMONIA OF RIGHT LOWER LOBE DUE TO INFECTIOUS ORGANISM: ICD-10-CM

## 2024-02-03 DIAGNOSIS — I10 ESSENTIAL HYPERTENSION: Chronic | ICD-10-CM

## 2024-02-03 DIAGNOSIS — I95.9 HYPOTENSION: Primary | ICD-10-CM

## 2024-02-03 LAB
ALBUMIN SERPL BCP-MCNC: 3.5 G/DL (ref 3.4–5)
ALP SERPL-CCNC: 223 U/L (ref 33–136)
ALT SERPL W P-5'-P-CCNC: 24 U/L (ref 7–45)
ANION GAP BLDV CALCULATED.4IONS-SCNC: 2 MMOL/L (ref 10–25)
ANION GAP SERPL CALC-SCNC: 15 MMOL/L (ref 10–20)
AST SERPL W P-5'-P-CCNC: 19 U/L (ref 9–39)
BASE EXCESS BLDV CALC-SCNC: 11.6 MMOL/L (ref -2–3)
BASOPHILS # BLD AUTO: 0.03 X10*3/UL (ref 0–0.1)
BASOPHILS NFR BLD AUTO: 0.3 %
BILIRUB SERPL-MCNC: 0.6 MG/DL (ref 0–1.2)
BNP SERPL-MCNC: 243 PG/ML (ref 0–99)
BODY TEMPERATURE: 37 DEGREES CELSIUS
BUN SERPL-MCNC: 19 MG/DL (ref 6–23)
CA-I BLDV-SCNC: 1.13 MMOL/L (ref 1.1–1.33)
CALCIUM SERPL-MCNC: 9 MG/DL (ref 8.6–10.3)
CARDIAC TROPONIN I PNL SERPL HS: 18 NG/L (ref 0–13)
CARDIAC TROPONIN I PNL SERPL HS: 21 NG/L (ref 0–13)
CHLORIDE BLDV-SCNC: 94 MMOL/L (ref 98–107)
CHLORIDE SERPL-SCNC: 90 MMOL/L (ref 98–107)
CO2 SERPL-SCNC: 29 MMOL/L (ref 21–32)
CREAT SERPL-MCNC: 3.74 MG/DL (ref 0.5–1.05)
EGFRCR SERPLBLD CKD-EPI 2021: 11 ML/MIN/1.73M*2
EOSINOPHIL # BLD AUTO: 0.32 X10*3/UL (ref 0–0.4)
EOSINOPHIL NFR BLD AUTO: 3.2 %
ERYTHROCYTE [DISTWIDTH] IN BLOOD BY AUTOMATED COUNT: 15.9 % (ref 11.5–14.5)
FLUAV RNA RESP QL NAA+PROBE: NOT DETECTED
FLUBV RNA RESP QL NAA+PROBE: NOT DETECTED
GLUCOSE BLDV-MCNC: 110 MG/DL (ref 74–99)
GLUCOSE SERPL-MCNC: 98 MG/DL (ref 74–99)
HCO3 BLDV-SCNC: 35.9 MMOL/L (ref 22–26)
HCT VFR BLD AUTO: 41.6 % (ref 36–46)
HCT VFR BLD EST: 41 % (ref 36–46)
HGB BLD-MCNC: 12.6 G/DL (ref 12–16)
HGB BLDV-MCNC: 13.5 G/DL (ref 12–16)
IMM GRANULOCYTES # BLD AUTO: 0.07 X10*3/UL (ref 0–0.5)
IMM GRANULOCYTES NFR BLD AUTO: 0.7 % (ref 0–0.9)
INHALED O2 CONCENTRATION: 21 %
INR PPP: 1.1 (ref 0.9–1.1)
LACTATE BLDV-SCNC: 1.5 MMOL/L (ref 0.4–2)
LACTATE SERPL-SCNC: 1 MMOL/L (ref 0.4–2)
LYMPHOCYTES # BLD AUTO: 1.14 X10*3/UL (ref 0.8–3)
LYMPHOCYTES NFR BLD AUTO: 11.3 %
MAGNESIUM SERPL-MCNC: 2.3 MG/DL (ref 1.6–2.4)
MCH RBC QN AUTO: 28.5 PG (ref 26–34)
MCHC RBC AUTO-ENTMCNC: 30.3 G/DL (ref 32–36)
MCV RBC AUTO: 94 FL (ref 80–100)
MONOCYTES # BLD AUTO: 1 X10*3/UL (ref 0.05–0.8)
MONOCYTES NFR BLD AUTO: 9.9 %
MRSA DNA SPEC QL NAA+PROBE: NOT DETECTED
NEUTROPHILS # BLD AUTO: 7.57 X10*3/UL (ref 1.6–5.5)
NEUTROPHILS NFR BLD AUTO: 74.6 %
NRBC BLD-RTO: 0 /100 WBCS (ref 0–0)
OXYHGB MFR BLDV: 76.1 % (ref 45–75)
PCO2 BLDV: 44 MM HG (ref 41–51)
PH BLDV: 7.52 PH (ref 7.33–7.43)
PLATELET # BLD AUTO: 207 X10*3/UL (ref 150–450)
PO2 BLDV: 51 MM HG (ref 35–45)
POTASSIUM BLDV-SCNC: 4.4 MMOL/L (ref 3.5–5.3)
POTASSIUM SERPL-SCNC: 4 MMOL/L (ref 3.5–5.3)
PROT SERPL-MCNC: 8.3 G/DL (ref 6.4–8.2)
PROTHROMBIN TIME: 12.8 SECONDS (ref 9.8–12.8)
RBC # BLD AUTO: 4.42 X10*6/UL (ref 4–5.2)
SAO2 % BLDV: 78 % (ref 45–75)
SARS-COV-2 RNA RESP QL NAA+PROBE: NOT DETECTED
SODIUM BLDV-SCNC: 127 MMOL/L (ref 136–145)
SODIUM SERPL-SCNC: 130 MMOL/L (ref 136–145)
WBC # BLD AUTO: 10.1 X10*3/UL (ref 4.4–11.3)

## 2024-02-03 PROCEDURE — 96368 THER/DIAG CONCURRENT INF: CPT

## 2024-02-03 PROCEDURE — 2500000004 HC RX 250 GENERAL PHARMACY W/ HCPCS (ALT 636 FOR OP/ED): Performed by: EMERGENCY MEDICINE

## 2024-02-03 PROCEDURE — 84484 ASSAY OF TROPONIN QUANT: CPT | Performed by: EMERGENCY MEDICINE

## 2024-02-03 PROCEDURE — 83735 ASSAY OF MAGNESIUM: CPT | Performed by: EMERGENCY MEDICINE

## 2024-02-03 PROCEDURE — 36415 COLL VENOUS BLD VENIPUNCTURE: CPT | Performed by: EMERGENCY MEDICINE

## 2024-02-03 PROCEDURE — 71045 X-RAY EXAM CHEST 1 VIEW: CPT

## 2024-02-03 PROCEDURE — 85610 PROTHROMBIN TIME: CPT | Performed by: EMERGENCY MEDICINE

## 2024-02-03 PROCEDURE — 84132 ASSAY OF SERUM POTASSIUM: CPT | Performed by: EMERGENCY MEDICINE

## 2024-02-03 PROCEDURE — 99285 EMERGENCY DEPT VISIT HI MDM: CPT | Mod: 25 | Performed by: EMERGENCY MEDICINE

## 2024-02-03 PROCEDURE — 83880 ASSAY OF NATRIURETIC PEPTIDE: CPT | Performed by: EMERGENCY MEDICINE

## 2024-02-03 PROCEDURE — 96361 HYDRATE IV INFUSION ADD-ON: CPT

## 2024-02-03 PROCEDURE — 83605 ASSAY OF LACTIC ACID: CPT | Performed by: EMERGENCY MEDICINE

## 2024-02-03 PROCEDURE — 93005 ELECTROCARDIOGRAM TRACING: CPT

## 2024-02-03 PROCEDURE — 87641 MR-STAPH DNA AMP PROBE: CPT | Performed by: EMERGENCY MEDICINE

## 2024-02-03 PROCEDURE — 2500000004 HC RX 250 GENERAL PHARMACY W/ HCPCS (ALT 636 FOR OP/ED)

## 2024-02-03 PROCEDURE — 85025 COMPLETE CBC W/AUTO DIFF WBC: CPT | Performed by: EMERGENCY MEDICINE

## 2024-02-03 PROCEDURE — 96367 TX/PROPH/DG ADDL SEQ IV INF: CPT

## 2024-02-03 PROCEDURE — 96365 THER/PROPH/DIAG IV INF INIT: CPT

## 2024-02-03 PROCEDURE — 87636 SARSCOV2 & INF A&B AMP PRB: CPT | Performed by: EMERGENCY MEDICINE

## 2024-02-03 PROCEDURE — 87040 BLOOD CULTURE FOR BACTERIA: CPT | Mod: AHULAB | Performed by: EMERGENCY MEDICINE

## 2024-02-03 PROCEDURE — 2060000001 HC INTERMEDIATE ICU ROOM DAILY

## 2024-02-03 PROCEDURE — 84100 ASSAY OF PHOSPHORUS: CPT | Performed by: INTERNAL MEDICINE

## 2024-02-03 PROCEDURE — 71045 X-RAY EXAM CHEST 1 VIEW: CPT | Mod: FOREIGN READ | Performed by: RADIOLOGY

## 2024-02-03 RX ORDER — TALC
3 POWDER (GRAM) TOPICAL NIGHTLY PRN
Status: DISCONTINUED | OUTPATIENT
Start: 2024-02-03 | End: 2024-02-04 | Stop reason: SDUPTHER

## 2024-02-03 RX ORDER — VANCOMYCIN HYDROCHLORIDE 1 G/200ML
1000 INJECTION, SOLUTION INTRAVENOUS ONCE
Status: COMPLETED | OUTPATIENT
Start: 2024-02-03 | End: 2024-02-03

## 2024-02-03 RX ORDER — PANTOPRAZOLE SODIUM 40 MG/1
40 TABLET, DELAYED RELEASE ORAL
Status: DISCONTINUED | OUTPATIENT
Start: 2024-02-04 | End: 2024-02-04 | Stop reason: SDUPTHER

## 2024-02-03 RX ORDER — ONDANSETRON HYDROCHLORIDE 2 MG/ML
4 INJECTION, SOLUTION INTRAVENOUS EVERY 6 HOURS PRN
Status: DISCONTINUED | OUTPATIENT
Start: 2024-02-03 | End: 2024-02-04 | Stop reason: SDUPTHER

## 2024-02-03 RX ORDER — ACETAMINOPHEN 325 MG/1
650 TABLET ORAL EVERY 6 HOURS PRN
Status: DISCONTINUED | OUTPATIENT
Start: 2024-02-03 | End: 2024-02-05 | Stop reason: HOSPADM

## 2024-02-03 RX ORDER — TRAMADOL HYDROCHLORIDE 50 MG/1
50 TABLET ORAL EVERY 12 HOURS PRN
Status: DISCONTINUED | OUTPATIENT
Start: 2024-02-03 | End: 2024-02-05 | Stop reason: HOSPADM

## 2024-02-03 RX ADMIN — PIPERACILLIN SODIUM AND TAZOBACTAM SODIUM 3.38 G: 3; .375 INJECTION, SOLUTION INTRAVENOUS at 17:51

## 2024-02-03 RX ADMIN — SODIUM CHLORIDE, SODIUM LACTATE, POTASSIUM CHLORIDE, AND CALCIUM CHLORIDE 500 ML: 600; 310; 30; 20 INJECTION, SOLUTION INTRAVENOUS at 20:00

## 2024-02-03 RX ADMIN — VANCOMYCIN HYDROCHLORIDE 1000 MG: 1 INJECTION, SOLUTION INTRAVENOUS at 18:43

## 2024-02-03 RX ADMIN — AZITHROMYCIN MONOHYDRATE 500 MG: 500 INJECTION, POWDER, LYOPHILIZED, FOR SOLUTION INTRAVENOUS at 18:22

## 2024-02-03 RX ADMIN — SODIUM CHLORIDE, SODIUM LACTATE, POTASSIUM CHLORIDE, AND CALCIUM CHLORIDE 1000 ML: 600; 310; 30; 20 INJECTION, SOLUTION INTRAVENOUS at 17:42

## 2024-02-03 ASSESSMENT — COLUMBIA-SUICIDE SEVERITY RATING SCALE - C-SSRS
1. IN THE PAST MONTH, HAVE YOU WISHED YOU WERE DEAD OR WISHED YOU COULD GO TO SLEEP AND NOT WAKE UP?: NO
2. HAVE YOU ACTUALLY HAD ANY THOUGHTS OF KILLING YOURSELF?: NO
6. HAVE YOU EVER DONE ANYTHING, STARTED TO DO ANYTHING, OR PREPARED TO DO ANYTHING TO END YOUR LIFE?: NO

## 2024-02-03 NOTE — ED PROVIDER NOTES
History/Exam limitations: none.   Additional history was obtained from patient and relative(s).          HPI:    Alyce Dougherty is a 83 y.o. female PMH ESRD on HD TTS, CHF, prior GI bleed, breast cancer, right upper extremity lymphedema recent hospitalization for fever and bronchitis presenting after episode of loss of conscious.  Patient was reportedly sitting down at home and had been home from dialysis for some time today.  Had normal dialysis session today.  Family reportedly states that she lost consciousness for approximately 1 minute.  Patient denies any preceding symptoms.  Patient is unsure what happened but states she feels at baseline now.  Reportedly no shaking or postictal type.  Patient has a headache vision changes, chest pain, cough, neck stiffness, nausea, vomiting, abdominal pain, diarrhea, dysuria.  Dates that she felt well after dialysis.          Physical Exam:  ED Triage Vitals [02/03/24 1712]   Temperature Heart Rate Respirations BP   36.4 °C (97.6 °F) 74 16 109/52      SpO2 Temp Source Heart Rate Source Patient Position   -- Temporal -- --      BP Location FiO2 (%)     -- --        GEN:      Alert, NAD  Eyes:       PERRL, EOMI  HENT:      NC/AT, OP clear, airway patent, MM  CV:      RRR, no MRG, no LE pitting edema, 2+ radial and pedal pulses  PULM:     CTAB, no w/r/r, easy WOB, symmetric chest rise  ABD:      Soft, NT, ND, no masses, BS +  :       No CVA TTP  NEURO:   A/Ox4, CN II-XII intact, strength 5/5 in all 4 ext, SILT, FNF normal b/l, no pronator drift, no nystagmus, no truncal instability, negative test of skew  MSK:      FROM, no joint deformities or swelling, no e/o trauma  SKIN:       Warm and dry  PSYCH:    Appropriate mood and affect         MDM/ED Course:   Alyce Dougherty is a 83 y.o. female PMH ESRD on HD TTS, CHF, prior GI bleed, breast cancer, right upper extremity lymphedema recent hospitalization for fever and bronchitis presenting after episode of loss of conscious.   Initial vitals markable for blood pressure 109/52, reassuring heart rate.  Exam as documented above.  Differential includes syncope secondary to vasovagal, orthostatic hypotension, dehydration, arrhythmia, infectious etiology, ACS, stroke. Unlikely ACS given no chest pressure, chest pain, but has multiple risk factors including age and medical diseases. Doubt TIA/stroke given no report of focal neurologic deficits and evidence of this on exam.  Shortly after arrival, patient became hypotensive with maps in the 50s.  Became lethargic with no focal neurodeficits.  Heart rate remained in the 70s.  Patient's family at bedside and states that this is how she appeared during the episode at home.  No signs of underlying arrhythmia, appears sinus and narrow complex.  Sepsis alert initiated and patient to be given initially 1 L IV fluids given ESRD history.  Differential includes underlying infection, dehydration in setting of dialysis today.  Patient does appear dry on exam.  Patient does not appear to be on midodrine at home reviewed home medication list with patient's family.  Labs obtained CBC with no leukocytosis or significant anemia.  Venous blood gas overall reassuring.  Chemistry reassuring, alkaline phosphatase elevated consistent with prior.  Potassium reassuring.  Sodium low at 138 consistent with prior.  Lactate normal.   from last 400s and previously much higher.  Troponin stable x 2 unlikely ACS.  COVID flu negative.  Chest x-ray with possible right lower lobe infiltrate, IV azithromycin added.  Patient's blood pressure improved and mental status remained reassuring on reassessment.  Blood pressure remains slightly soft however additional 5 cc IV fluids given.  Maps remain low 70s on reassessments.  Patient was signed out to Dr. Corado for continued management.     ED Course as of 02/05/24 1618   Sat Feb 03, 2024   1721 EKG reviewed by me: 5:11 PM normal sinus rhythm, first-degree AV block, OH interval  222 ms, normal axis, no STEMI, T wave version borderline depressions in inferior leads. [JM]   1735 Patient was normotensive on arrival however became fatigued and repeat blood pressure with maps in the 50s.  Heart rate remained 70s with narrow complex.  Family bedside states that she got lethargic during the episode earlier dose that she has now.  Patient is alert and oriented still however or fatigue than previous baseline.  They reportedly removed 2 L at dialysis today, unclear whether this is baseline or not.  No blood pressure meds were given after dialysis.  Patient is not on home midodrine reportedly. [JM]      ED Course User Index  [JM] Richar Conteh MD         Diagnoses as of 02/05/24 1618   Hypotension         Chronic medical conditions affecting care listed in MDM. I independently reviewed imaging studies and agreed with radiology reads. I reviewed recent and relevant outside records including PCP notes, Prior discharge summaries, and prior radiology reports.    Procedure  Procedures    Diagnosis:   1.  Hypotension  2.  Syncope  3.  Pneumonia    Dispo: Hospitalized in stable condition      Disclaimer: Portions of this note were dictated by speech recognition. An attempt at proof reading was made to minimize errors. Minor errors in transcription may be present.  Please call if questions.     Richar Conteh MD  02/05/24 1616

## 2024-02-04 LAB
BACTERIA BLD CULT: NORMAL
BACTERIA BLD CULT: NORMAL
PHOSPHATE SERPL-MCNC: 3.6 MG/DL (ref 2.5–4.9)

## 2024-02-04 PROCEDURE — 2500000001 HC RX 250 WO HCPCS SELF ADMINISTERED DRUGS (ALT 637 FOR MEDICARE OP): Performed by: INTERNAL MEDICINE

## 2024-02-04 PROCEDURE — 99222 1ST HOSP IP/OBS MODERATE 55: CPT | Performed by: INTERNAL MEDICINE

## 2024-02-04 PROCEDURE — 1200000002 HC GENERAL ROOM WITH TELEMETRY DAILY

## 2024-02-04 PROCEDURE — 2500000004 HC RX 250 GENERAL PHARMACY W/ HCPCS (ALT 636 FOR OP/ED): Performed by: INTERNAL MEDICINE

## 2024-02-04 PROCEDURE — 99223 1ST HOSP IP/OBS HIGH 75: CPT | Performed by: INTERNAL MEDICINE

## 2024-02-04 RX ORDER — ACETAMINOPHEN 325 MG/1
650 TABLET ORAL EVERY 4 HOURS PRN
Status: DISCONTINUED | OUTPATIENT
Start: 2024-02-04 | End: 2024-02-05 | Stop reason: HOSPADM

## 2024-02-04 RX ORDER — POLYETHYLENE GLYCOL 3350 17 G/17G
17 POWDER, FOR SOLUTION ORAL DAILY PRN
Status: DISCONTINUED | OUTPATIENT
Start: 2024-02-04 | End: 2024-02-05 | Stop reason: HOSPADM

## 2024-02-04 RX ORDER — GUAIFENESIN 600 MG/1
600 TABLET, EXTENDED RELEASE ORAL EVERY 12 HOURS PRN
Status: DISCONTINUED | OUTPATIENT
Start: 2024-02-04 | End: 2024-02-05 | Stop reason: HOSPADM

## 2024-02-04 RX ORDER — ONDANSETRON HYDROCHLORIDE 2 MG/ML
4 INJECTION, SOLUTION INTRAVENOUS EVERY 8 HOURS PRN
Status: DISCONTINUED | OUTPATIENT
Start: 2024-02-04 | End: 2024-02-05 | Stop reason: HOSPADM

## 2024-02-04 RX ORDER — TALC
3 POWDER (GRAM) TOPICAL DAILY
Status: DISCONTINUED | OUTPATIENT
Start: 2024-02-04 | End: 2024-02-05 | Stop reason: HOSPADM

## 2024-02-04 RX ORDER — ACETAMINOPHEN 160 MG/5ML
650 SOLUTION ORAL EVERY 4 HOURS PRN
Status: DISCONTINUED | OUTPATIENT
Start: 2024-02-04 | End: 2024-02-05 | Stop reason: HOSPADM

## 2024-02-04 RX ORDER — ONDANSETRON 4 MG/1
4 TABLET, FILM COATED ORAL EVERY 8 HOURS PRN
Status: DISCONTINUED | OUTPATIENT
Start: 2024-02-04 | End: 2024-02-05 | Stop reason: HOSPADM

## 2024-02-04 RX ORDER — PANTOPRAZOLE SODIUM 40 MG/10ML
40 INJECTION, POWDER, LYOPHILIZED, FOR SOLUTION INTRAVENOUS
Status: DISCONTINUED | OUTPATIENT
Start: 2024-02-05 | End: 2024-02-05 | Stop reason: HOSPADM

## 2024-02-04 RX ORDER — TALC
3 POWDER (GRAM) TOPICAL NIGHTLY PRN
Status: DISCONTINUED | OUTPATIENT
Start: 2024-02-04 | End: 2024-02-04 | Stop reason: SDUPTHER

## 2024-02-04 RX ORDER — HEPARIN SODIUM 5000 [USP'U]/ML
5000 INJECTION, SOLUTION INTRAVENOUS; SUBCUTANEOUS EVERY 8 HOURS
Status: DISCONTINUED | OUTPATIENT
Start: 2024-02-04 | End: 2024-02-05 | Stop reason: HOSPADM

## 2024-02-04 RX ORDER — PANTOPRAZOLE SODIUM 40 MG/1
40 TABLET, DELAYED RELEASE ORAL
Status: DISCONTINUED | OUTPATIENT
Start: 2024-02-05 | End: 2024-02-05 | Stop reason: HOSPADM

## 2024-02-04 RX ORDER — ISOSORBIDE MONONITRATE 30 MG/1
60 TABLET, EXTENDED RELEASE ORAL DAILY
Status: DISCONTINUED | OUTPATIENT
Start: 2024-02-04 | End: 2024-02-05 | Stop reason: HOSPADM

## 2024-02-04 RX ORDER — ASPIRIN 81 MG/1
81 TABLET ORAL DAILY
Status: DISCONTINUED | OUTPATIENT
Start: 2024-02-04 | End: 2024-02-05 | Stop reason: HOSPADM

## 2024-02-04 RX ORDER — SEVELAMER CARBONATE 800 MG/1
800 TABLET, FILM COATED ORAL
Status: DISCONTINUED | OUTPATIENT
Start: 2024-02-04 | End: 2024-02-05 | Stop reason: HOSPADM

## 2024-02-04 RX ORDER — ACETAMINOPHEN 650 MG/1
650 SUPPOSITORY RECTAL EVERY 4 HOURS PRN
Status: DISCONTINUED | OUTPATIENT
Start: 2024-02-04 | End: 2024-02-05 | Stop reason: HOSPADM

## 2024-02-04 RX ORDER — MIRTAZAPINE 15 MG/1
7.5 TABLET, FILM COATED ORAL NIGHTLY
Status: DISCONTINUED | OUTPATIENT
Start: 2024-02-04 | End: 2024-02-05 | Stop reason: HOSPADM

## 2024-02-04 RX ORDER — ATORVASTATIN CALCIUM 80 MG/1
80 TABLET, FILM COATED ORAL DAILY
Status: DISCONTINUED | OUTPATIENT
Start: 2024-02-04 | End: 2024-02-05 | Stop reason: HOSPADM

## 2024-02-04 RX ORDER — CARVEDILOL 12.5 MG/1
12.5 TABLET ORAL 2 TIMES DAILY
Status: DISCONTINUED | OUTPATIENT
Start: 2024-02-04 | End: 2024-02-05

## 2024-02-04 RX ADMIN — PIPERACILLIN SODIUM AND TAZOBACTAM SODIUM 2.25 G: 2; .25 INJECTION, SOLUTION INTRAVENOUS at 14:09

## 2024-02-04 RX ADMIN — ISOSORBIDE MONONITRATE 60 MG: 30 TABLET, EXTENDED RELEASE ORAL at 14:14

## 2024-02-04 RX ADMIN — CARVEDILOL 12.5 MG: 12.5 TABLET, FILM COATED ORAL at 14:13

## 2024-02-04 RX ADMIN — SEVELAMER CARBONATE 800 MG: 800 TABLET, FILM COATED ORAL at 14:14

## 2024-02-04 RX ADMIN — HEPARIN SODIUM 5000 UNITS: 5000 INJECTION INTRAVENOUS; SUBCUTANEOUS at 20:44

## 2024-02-04 RX ADMIN — PIPERACILLIN SODIUM AND TAZOBACTAM SODIUM 2.25 G: 2; .25 INJECTION, SOLUTION INTRAVENOUS at 20:39

## 2024-02-04 RX ADMIN — HEPARIN SODIUM 5000 UNITS: 5000 INJECTION INTRAVENOUS; SUBCUTANEOUS at 14:15

## 2024-02-04 RX ADMIN — MIRTAZAPINE 7.5 MG: 15 TABLET, FILM COATED ORAL at 20:39

## 2024-02-04 RX ADMIN — ASPIRIN 81 MG: 81 TABLET, COATED ORAL at 14:14

## 2024-02-04 RX ADMIN — ATORVASTATIN CALCIUM 80 MG: 80 TABLET, FILM COATED ORAL at 14:14

## 2024-02-04 RX ADMIN — CARVEDILOL 12.5 MG: 12.5 TABLET, FILM COATED ORAL at 20:39

## 2024-02-04 RX ADMIN — Medication 3 MG: at 20:39

## 2024-02-04 RX ADMIN — PANTOPRAZOLE SODIUM 40 MG: 40 TABLET, DELAYED RELEASE ORAL at 06:28

## 2024-02-04 RX ADMIN — SEVELAMER CARBONATE 800 MG: 800 TABLET, FILM COATED ORAL at 18:38

## 2024-02-04 SDOH — ECONOMIC STABILITY: TRANSPORTATION INSECURITY
IN THE PAST 12 MONTHS, HAS LACK OF TRANSPORTATION KEPT YOU FROM MEETINGS, WORK, OR FROM GETTING THINGS NEEDED FOR DAILY LIVING?: PATIENT DECLINED

## 2024-02-04 SDOH — ECONOMIC STABILITY: HOUSING INSECURITY
IN THE LAST 12 MONTHS, WAS THERE A TIME WHEN YOU DID NOT HAVE A STEADY PLACE TO SLEEP OR SLEPT IN A SHELTER (INCLUDING NOW)?: PATIENT DECLINED

## 2024-02-04 SDOH — SOCIAL STABILITY: SOCIAL INSECURITY: ABUSE: ADULT

## 2024-02-04 SDOH — SOCIAL STABILITY: SOCIAL INSECURITY: HAVE YOU HAD THOUGHTS OF HARMING ANYONE ELSE?: NO

## 2024-02-04 SDOH — SOCIAL STABILITY: SOCIAL INSECURITY: DOES ANYONE TRY TO KEEP YOU FROM HAVING/CONTACTING OTHER FRIENDS OR DOING THINGS OUTSIDE YOUR HOME?: NO

## 2024-02-04 SDOH — SOCIAL STABILITY: SOCIAL INSECURITY: DO YOU FEEL UNSAFE GOING BACK TO THE PLACE WHERE YOU ARE LIVING?: NO

## 2024-02-04 SDOH — SOCIAL STABILITY: SOCIAL INSECURITY: ARE THERE ANY APPARENT SIGNS OF INJURIES/BEHAVIORS THAT COULD BE RELATED TO ABUSE/NEGLECT?: NO

## 2024-02-04 SDOH — ECONOMIC STABILITY: INCOME INSECURITY: IN THE LAST 12 MONTHS, WAS THERE A TIME WHEN YOU WERE NOT ABLE TO PAY THE MORTGAGE OR RENT ON TIME?: PATIENT DECLINED

## 2024-02-04 SDOH — ECONOMIC STABILITY: HOUSING INSECURITY: IN THE LAST 12 MONTHS, HOW MANY PLACES HAVE YOU LIVED?: 1

## 2024-02-04 SDOH — SOCIAL STABILITY: SOCIAL INSECURITY: WERE YOU ABLE TO COMPLETE ALL THE BEHAVIORAL HEALTH SCREENINGS?: YES

## 2024-02-04 SDOH — ECONOMIC STABILITY: TRANSPORTATION INSECURITY
IN THE PAST 12 MONTHS, HAS THE LACK OF TRANSPORTATION KEPT YOU FROM MEDICAL APPOINTMENTS OR FROM GETTING MEDICATIONS?: PATIENT DECLINED

## 2024-02-04 SDOH — SOCIAL STABILITY: SOCIAL INSECURITY: DO YOU FEEL ANYONE HAS EXPLOITED OR TAKEN ADVANTAGE OF YOU FINANCIALLY OR OF YOUR PERSONAL PROPERTY?: NO

## 2024-02-04 SDOH — SOCIAL STABILITY: SOCIAL INSECURITY: HAS ANYONE EVER THREATENED TO HURT YOUR FAMILY OR YOUR PETS?: NO

## 2024-02-04 SDOH — ECONOMIC STABILITY: INCOME INSECURITY: HOW HARD IS IT FOR YOU TO PAY FOR THE VERY BASICS LIKE FOOD, HOUSING, MEDICAL CARE, AND HEATING?: PATIENT DECLINED

## 2024-02-04 SDOH — SOCIAL STABILITY: SOCIAL INSECURITY: ARE YOU OR HAVE YOU BEEN THREATENED OR ABUSED PHYSICALLY, EMOTIONALLY, OR SEXUALLY BY ANYONE?: NO

## 2024-02-04 ASSESSMENT — COGNITIVE AND FUNCTIONAL STATUS - GENERAL
STANDING UP FROM CHAIR USING ARMS: A LITTLE
WALKING IN HOSPITAL ROOM: A LITTLE
DRESSING REGULAR LOWER BODY CLOTHING: A LITTLE
CLIMB 3 TO 5 STEPS WITH RAILING: A LITTLE
MOVING TO AND FROM BED TO CHAIR: A LITTLE
MOBILITY SCORE: 22
TOILETING: A LITTLE
DRESSING REGULAR UPPER BODY CLOTHING: A LITTLE
PERSONAL GROOMING: A LITTLE
WALKING IN HOSPITAL ROOM: A LITTLE
DAILY ACTIVITIY SCORE: 24
HELP NEEDED FOR BATHING: A LITTLE
MOVING FROM LYING ON BACK TO SITTING ON SIDE OF FLAT BED WITH BEDRAILS: A LITTLE
TURNING FROM BACK TO SIDE WHILE IN FLAT BAD: A LITTLE
PATIENT BASELINE BEDBOUND: NO
DAILY ACTIVITIY SCORE: 19
MOBILITY SCORE: 17
CLIMB 3 TO 5 STEPS WITH RAILING: A LOT

## 2024-02-04 ASSESSMENT — ACTIVITIES OF DAILY LIVING (ADL)
LACK_OF_TRANSPORTATION: PATIENT DECLINED
TOILETING: NEEDS ASSISTANCE
ADEQUATE_TO_COMPLETE_ADL: YES
WALKS IN HOME: NEEDS ASSISTANCE
DRESSING YOURSELF: NEEDS ASSISTANCE
FEEDING YOURSELF: NEEDS ASSISTANCE
JUDGMENT_ADEQUATE_SAFELY_COMPLETE_DAILY_ACTIVITIES: YES
BATHING: NEEDS ASSISTANCE
GROOMING: NEEDS ASSISTANCE
HEARING - LEFT EAR: DIFFICULTY WITH NOISE
PATIENT'S MEMORY ADEQUATE TO SAFELY COMPLETE DAILY ACTIVITIES?: YES
ASSISTIVE_DEVICE: WALKER;CANE
HEARING - RIGHT EAR: DIFFICULTY WITH NOISE

## 2024-02-04 ASSESSMENT — PAIN - FUNCTIONAL ASSESSMENT
PAIN_FUNCTIONAL_ASSESSMENT: 0-10
PAIN_FUNCTIONAL_ASSESSMENT: 0-10

## 2024-02-04 ASSESSMENT — LIFESTYLE VARIABLES
HOW OFTEN DO YOU HAVE A DRINK CONTAINING ALCOHOL: NEVER
SKIP TO QUESTIONS 9-10: 1
AUDIT-C TOTAL SCORE: 0
HOW MANY STANDARD DRINKS CONTAINING ALCOHOL DO YOU HAVE ON A TYPICAL DAY: PATIENT DOES NOT DRINK
AUDIT-C TOTAL SCORE: 0
HOW OFTEN DO YOU HAVE 6 OR MORE DRINKS ON ONE OCCASION: NEVER

## 2024-02-04 ASSESSMENT — PAIN SCALES - GENERAL
PAINLEVEL_OUTOF10: 0 - NO PAIN
PAINLEVEL_OUTOF10: 0 - NO PAIN

## 2024-02-04 ASSESSMENT — ENCOUNTER SYMPTOMS: SYNCOPE: 1

## 2024-02-04 NOTE — PROGRESS NOTES
Pharmacy Medication History Review    Alyce Dougherty is a 83 y.o. female admitted for Hypotension. Pharmacy reviewed the patient's [id number] medications and allergies for accuracy.    The list below reflectives the updated PTA list. Please review each medication in order reconciliation for additional clarification and justification.  Prior to Admission medications    Medication Sig Start Date End Date Taking? Authorizing Provider                                                                                                                                                                        The list below reflectives the updated allergy list. Please review each documented allergy for additional clarification and justification.  Allergies  Reviewed by Loni Mireles RN on 2/3/2024        Severity Reactions Comments    Lisinopril High Angioedema     Hydralazine Medium Unknown, Itching, Other     Metoprolol Not Specified Itching     Hydrochlorothiazide Low Itching, Rash, Unknown Feels too tired and voids to often on this medication and refuses to use.            Below are additional concerns with the patient's PTA list.  Prior to Admission Medications   Prescriptions Last Dose Informant   amLODIPine (Norvasc) 5 mg tablet 2/3/2024    Sig: Take 1 tablet (5 mg) by mouth once daily.   aspirin 81 mg EC tablet 2/3/2024    Sig: Take 1 tablet (81 mg) by mouth once daily.   atorvastatin (Lipitor) 80 mg tablet 2/3/2024    Sig: Take 1 tablet (80 mg) by mouth once daily.   azithromycin (Zithromax) 250 mg tablet 2/3/2024    Sig: Take 1 tablet (250 mg) by mouth once every 24 hours for 4 days. Do not start before February 1, 2024.   carvedilol (Coreg) 12.5 mg tablet 2/3/2024    Sig: Take 1 tablet (12.5 mg) by mouth 2 times a day.   furosemide (Lasix) 40 mg tablet 2/3/2024    Sig: Take 1 tablet (40 mg) by mouth 2 times a day.   gabapentin (Neurontin) 100 mg capsule 2/3/2024    Sig: Take 1 capsule (100 mg) by mouth once  daily at bedtime.   isosorbide mononitrate ER (Imdur) 30 mg 24 hr tablet     Sig: Take 2 tablets (60 mg) by mouth once daily.   lidocaine (Lidoderm) 5 % patch Not Taking    Sig: Place 1 patch over 12 hours on the skin once daily. Apply to painful area 12 hours per day, remove for 12 hours.   Patient not taking: Reported on 2/4/2024   magnesium oxide (Mag-Ox) 400 mg (241.3 mg magnesium) tablet 2/3/2024    Sig: Take 1 tablet (400 mg) by mouth once daily.   melatonin 3 mg tablet     Sig: Take 1 tablet (3 mg) by mouth as needed at bedtime for sleep.   mirtazapine (Remeron) 7.5 mg tablet 2/3/2024    Sig: Take 1 tablet (7.5 mg) by mouth once daily at bedtime.   sevelamer (Renagel) 800 mg tablet     Sig: Take 1 tablet (800 mg) by mouth once daily. Before meals   Patient taking differently: Take 1 tablet (800 mg) by mouth 3 times a day with meals. Before meals   tiotropium (Spiriva with HandiHaler) 18 mcg inhalation capsule 2/3/2024    Sig: Place 1 capsule (18 mcg) into inhaler and inhale once daily at bedtime.      Facility-Administered Medications: None      Per patient.     Dulce Gan CPhT

## 2024-02-04 NOTE — H&P
Alyce Dougherty is a 83 y.o. female   Syncope       Patient with a past medical history of HTN, HLD, ESRD on hemodialysis (T, Th, Sat), HFrEF, PAD, GERD, Anemia of Chronic Disease, history of Breast CA status postlumpectomy s/p displaced fractures of left superior and inferior pubic rami after a fall, Pseudogout, Osteoarthritis who was recently seen for acute bronchitis and treated with a Z-Michele returns with an episode of acute mental status changes that happened a few hours after dialysis  She was noted to have low blood pressures and sent to the emergency room  Given a fluid bolus and the blood pressure stabilized  However imaging shows a left lower lobe pneumonia  Patient continues to have a cough    Past Medical History  Past Medical History:   Diagnosis Date    Chronic combined systolic and diastolic congestive heart failure (CMS/[facility]) 01/31/2024    -ECHO 8/1/2022:  The left ventricular systolic function is moderately to severely decreased, with an estimated ejection fraction of 30-35%. There is global hypokinesis of the left ventricle with minor regional variations. The left ventricular cavity size is normal. Spectral Doppler shows a pseudonormal pattern of left ventricular diastolic filling.     Chronic systolic (congestive) heart failure (CMS/[facility]) 06/26/2022    End stage renal disease (CMS/[facility]) 06/26/2022    Essential hypertension 03/14/2023    Hyperlipidemia 03/14/2023    Metabolic encephalopathy 07/20/2022    PAD (peripheral artery disease) (CMS/[facility]) 03/14/2023       Surgical History  Past Surgical History:   Procedure Laterality Date    CT ABDOMEN PELVIS ANGIOGRAM W AND/OR WO IV CONTRAST  3/19/2023    CT ABDOMEN PELVIS ANGIOGRAM W AND/OR WO IV CONTRAST U CT    CT ABDOMEN PELVIS ANGIOGRAM W AND/OR WO IV CONTRAST  6/7/2023    CT ABDOMEN PELVIS ANGIOGRAM W AND/OR WO IV CONTRAST 6/7/2023 [facility] CT    OTHER SURGICAL HISTORY  10/03/2020    Arteriovenous graft fistula creation procedure    OTHER SURGICAL HISTORY   10/03/2020    Arteriovenous fistula creation procedure    OTHER SURGICAL HISTORY  10/03/2020    Lumpectomy    OTHER SURGICAL HISTORY  10/03/2020    Cataract surgery    OTHER SURGICAL HISTORY  10/03/2020    Tubal ligation bilateral    OTHER SURGICAL HISTORY  10/03/2020    Lung biopsy    OTHER SURGICAL HISTORY  10/03/2020    Mastectomy partial    OTHER SURGICAL HISTORY  10/03/2020    Tonsillectomy        Social History  She reports that she has never smoked. She has never used smokeless tobacco. She reports that she does not currently use alcohol. She reports that she does not currently use drugs.    Family History  Family History   Problem Relation Name Age of Onset    Other (CVA) Mother      Coronary artery disease Mother      Lymphoma Father      Breast cancer Sister      Breast cancer Daughter          Allergies  Lisinopril, Hydralazine, Metoprolol, and Hydrochlorothiazide    Review of Systems   Cardiovascular:  Positive for syncope.        Constitutional: not feeling poorly, no fever, no recent weight gain and no recent weight loss.   Eyes: no blurred vision and no diplopia.   ENT: no hearing loss, no tinnitus, no earache, no sore throat, no hoarseness and no swollen glands in the neck.   Cardiovascular: no chest pain, no tightness or heavy pressure, no shortness of breath, no palpitations and no lower extremity edema.   Respiratory: Cough  Gastrointestinal: no change in bowel habits, no diarrhea, no constipation, no bloody stools, no nausea, no vomiting, no abdominal pain, no signs and symptoms of ulcer disease, no kimberly colored stools and no intolerance to fatty foods.   Genitourinary: no urinary frequency, no dysuria, no hematuria, no burning sensation during urination, urinary stream is not smaller and urinary stream does not start and stop.   Musculoskeletal: no arthralgias, no joint stiffness, no muscle weakness, no back pain and no difficulty walking.   Skin: no rashes, no change in skin color and  pigmentation, no skin lesions and no skin lumps.   Neurological: no headaches, no dizziness, no seizures, no tingling, no numbness, no signs and symptoms of stroke and no limb weakness.   Psychiatric: no confusion, no memory lapses or loss, no depression and no sleep disturbances.   Endocrineno excessive thirst, no dry skin, no cold intolerance, no heat intolerance and no increased urinary frequency.   Hematologic/Lymphatic: is not slow to heal, does not bleed easily, does not bruise easily, no thrombophlebitis, no anemia and no history of blood transfusion.   All other systems have been reviewed and are negative for complaint.     Vitals:    02/04/24 1217   BP: (!) 152/48   Pulse: 75   Resp:    Temp:    SpO2:         Scheduled medications  aspirin, 81 mg, oral, Daily  atorvastatin, 80 mg, oral, Daily  carvedilol, 12.5 mg, oral, BID  heparin (porcine), 5,000 Units, subcutaneous, q8h  isosorbide mononitrate ER, 60 mg, oral, Daily  melatonin, 3 mg, oral, Daily  mirtazapine, 7.5 mg, oral, Nightly  [START ON 2/5/2024] pantoprazole, 40 mg, oral, Daily before breakfast   Or  [START ON 2/5/2024] pantoprazole, 40 mg, intravenous, Daily before breakfast  piperacillin-tazobactam, 2.25 g, intravenous, q8h  sevelamer carbonate, 800 mg, oral, TID with meals  tiotropium, 2 Inhalation, inhalation, Daily      Continuous medications     PRN medications  PRN medications: acetaminophen **OR** acetaminophen **OR** acetaminophen, acetaminophen, guaiFENesin, ondansetron **OR** ondansetron, polyethylene glycol, traMADol    Results from last 7 days   Lab Units 02/03/24  1746 01/31/24  0550 01/30/24  1616   WBC AUTO x10*3/uL 10.1 9.8 10.7   HEMOGLOBIN g/dL 12.6 12.3 12.8   HEMATOCRIT % 41.6 40.0 42.2   PLATELETS AUTO x10*3/uL 207 178 182     Results from last 7 days   Lab Units 02/03/24  1746 01/31/24  1452 01/31/24  0550 01/30/24  1616   SODIUM mmol/L 130* 126* 127* 131*   POTASSIUM mmol/L 4.0 4.8 4.5 4.8   CHLORIDE mmol/L 90* 87* 89* 88*    CO2 mmol/L 29 23 24 29   BUN mg/dL 19 56* 45* 25*   CREATININE mg/dL 3.74* 6.97* 6.21* 4.70*   CALCIUM mg/dL 9.0 9.0 8.8 9.3   PROTEIN TOTAL g/dL 8.3*  --   --  8.6*   BILIRUBIN TOTAL mg/dL 0.6  --   --  0.8   ALK PHOS U/L 223*  --   --  285*   ALT U/L 24  --   --  22   AST U/L 19  --   --  21   GLUCOSE mg/dL 98 269* 155* 97     Results from last 7 days   Lab Units 02/03/24  1848 02/03/24  1746 01/30/24  1616   TROPHS ng/L 18* 21* 30*        XR chest 1 view   Final Result   Right lower lobe infiltrate on a background of interstitial change.   Signed by Lawson Viveros MD          Physical Exam     Constitutional   General appearance: Alert and in no acute distress.   Eyes   Inspection of eyes: Sclera and conjunctiva were normal.    Pupil exam: Pupils were equal in size. Extraocular movements were intact.   Pulmonary   Respiratory assessment: Basilar crackles on the right side  Cardiovascular   Auscultation of heart: Apical pulse normal, heart rate and rhythm normal, normal S1 and S2, no murmurs and no pericardial rub.    Exam for edema: No peripheral edema.   Abdomen   Abdominal Exam: No bruits, normal bowel sounds, soft, non-tender, no abdominal mass palpated.    Liver and Spleen exam: No hepato-splenomegaly.   Musculoskeletal   Examination of gait: Normal.    Inspection of digits and nails: No clubbing or cyanosis of the fingernails.    Inspection/palpation of joints, bones and muscles: No joint swelling. Normal movement of all extremities.   Skin   Skin inspection: Normal skin color and pigmentation, normal skin turgor and no visible rash.   Neurologic   Cranial nerves: Nerves 2-12 were intact, no focal neuro defects.   Psychiatric   Orientation: Oriented to person, place, and time.    Mood and affect: Normal.       Assessment/Plan      #Right lower lobe pneumonia  Start IV Zosyn  Sputum cultures if able to produce any phlegm    #Hypotension  Resolved after fluid bolus  Happened shortly after dialysis    #End-stage  renal disease  Consult nephrology for hemodialysis tomorrow morning  Will assess blood pressures postdialysis    #Chronic combined diastolic/systolic congestive heart failure  Currently euvolemic    #Hypertension  Hold medications for solid blood pressure less than 120    #Anemia of chronic disease  #Dyslipidemia  Stable

## 2024-02-04 NOTE — ED NOTES
Dr Corado messaged about possibly downgrading pt to tele due to BP trends being stable for the past 12 hours , pt is asymptomatic at this time. VS WNL     Loni Mireles, KERI  02/04/24 [id number]

## 2024-02-04 NOTE — CONSULTS
NEPHROLOGY CONSULT NOTE    Reason For Consult  End-stage renal disease on hemodialysis    History Of Present Illness  Alyce Dougherty is a 83 y.o. female  past medical history of HTN, HLD, ESRD on hemodialysis (T, Th, Sat which will be changed to MWF starting this week), HFrEF, PAD, GERD, Anemia of Chronic Disease, history of Breast CA status postlumpectomy s/p displaced fractures of left superior and inferior pubic rami after a fall, Pseudogout, Osteoarthritis -nephrology was consulted because of end-stage renal disease on hemodialysis.  Patient goes to [facility] Tuesday Thursday Saturday, her dialysis schedule at outpatient will be changed to Monday Wednesday Friday starting this week.   Patient was admitted to the hospital because of hypotension and pneumonia  On admission 2/3/2024: She was recently seen for acute bronchitis and treated with a Z-Michele returns with an episode of acute mental status changes that happened a few hours after dialysis  She was noted to have low blood pressures and sent to the emergency room  Given a fluid bolus and the blood pressure stabilized  However imaging shows a left lower lobe pneumonia  Patient continues to have a cough    Past Medical History:   Diagnosis Date    Chronic combined systolic and diastolic congestive heart failure (CMS/HCC) 01/31/2024    -ECHO 8/1/2022:  The left ventricular systolic function is moderately to severely decreased, with an estimated ejection fraction of 30-35%. There is global hypokinesis of the left ventricle with minor regional variations. The left ventricular cavity size is normal. Spectral Doppler shows a pseudonormal pattern of left ventricular diastolic filling.     Chronic systolic (congestive) heart failure (CMS/HCC) 06/26/2022    End stage renal disease (CMS/HCC) 06/26/2022    Essential hypertension 03/14/2023     Hyperlipidemia 03/14/2023    Metabolic encephalopathy 07/20/2022    PAD (peripheral artery disease) (CMS/HCC) 03/14/2023     Past Surgical History:   Procedure Laterality Date    CT ABDOMEN PELVIS ANGIOGRAM W AND/OR WO IV CONTRAST  3/19/2023    CT ABDOMEN PELVIS ANGIOGRAM W AND/OR WO IV CONTRAST [facility]    CT ABDOMEN PELVIS ANGIOGRAM W AND/OR WO IV CONTRAST  6/7/2023    CT ABDOMEN PELVIS ANGIOGRAM W AND/OR WO IV CONTRAST 6/7/2023 [facility] CT    OTHER SURGICAL HISTORY  10/03/2020    Arteriovenous graft fistula creation procedure    OTHER SURGICAL HISTORY  10/03/2020    Arteriovenous fistula creation procedure    OTHER SURGICAL HISTORY  10/03/2020    Lumpectomy    OTHER SURGICAL HISTORY  10/03/2020    Cataract surgery    OTHER SURGICAL HISTORY  10/03/2020    Tubal ligation bilateral    OTHER SURGICAL HISTORY  10/03/2020    Lung biopsy    OTHER SURGICAL HISTORY  10/03/2020    Mastectomy partial    OTHER SURGICAL HISTORY  10/03/2020    Tonsillectomy     Medications Prior to Admission   Medication Sig Dispense Refill Last Dose    amLODIPine (Norvasc) 5 mg tablet Take 1 tablet (5 mg) by mouth once daily. 60 tablet 2 2/3/2024    aspirin 81 mg EC tablet Take 1 tablet (81 mg) by mouth once daily.   2/3/2024    atorvastatin (Lipitor) 80 mg tablet Take 1 tablet (80 mg) by mouth once daily. 30 tablet 2 2/3/2024    azithromycin (Zithromax) 250 mg tablet Take 1 tablet (250 mg) by mouth once every 24 hours for 4 days. Do not start before February 1, 2024. 6 tablet 0 2/3/2024    carvedilol (Coreg) 12.5 mg tablet Take 1 tablet (12.5 mg) by mouth 2 times a day. 120 tablet 2 2/3/2024    furosemide (Lasix) 40 mg tablet Take 1 tablet (40 mg) by mouth 2 times a day. 60 tablet 3 2/3/2024    gabapentin (Neurontin) 100 mg capsule Take 1 capsule (100 mg) by mouth once daily at bedtime. 90 capsule 0 2/3/2024    isosorbide mononitrate ER (Imdur) 30 mg 24 hr tablet Take 2 tablets (60 mg) by mouth once daily. 30 tablet 0     lidocaine (Lidoderm) 5 %  patch Place 1 patch over 12 hours on the skin once daily. Apply to painful area 12 hours per day, remove for 12 hours. (Patient not taking: Reported on 2/4/2024) 30 patch 11 Not Taking    magnesium oxide (Mag-Ox) 400 mg (241.3 mg magnesium) tablet Take 1 tablet (400 mg) by mouth once daily.   2/3/2024    melatonin 3 mg tablet Take 1 tablet (3 mg) by mouth as needed at bedtime for sleep.       mirtazapine (Remeron) 7.5 mg tablet Take 1 tablet (7.5 mg) by mouth once daily at bedtime. 90 tablet 0 2/3/2024    sevelamer (Renagel) 800 mg tablet Take 1 tablet (800 mg) by mouth once daily. Before meals (Patient taking differently: Take 1 tablet (800 mg) by mouth 3 times a day with meals. Before meals) 30 tablet 3     tiotropium (Spiriva with HandiHaler) 18 mcg inhalation capsule Place 1 capsule (18 mcg) into inhaler and inhale once daily at bedtime. 30 capsule 11 2/3/2024     Lisinopril, Hydralazine, Metoprolol, and Hydrochlorothiazide  Social History     Tobacco Use    Smoking status: Never    Smokeless tobacco: Never   Vaping Use    Vaping Use: Never used   Substance Use Topics    Alcohol use: Not Currently    Drug use: Not Currently     Family History   Problem Relation Name Age of Onset    Other (CVA) Mother      Coronary artery disease Mother      Lymphoma Father      Breast cancer Sister      Breast cancer Daughter         Scheduled Medications:   aspirin, 81 mg, oral, Daily  atorvastatin, 80 mg, oral, Daily  carvedilol, 12.5 mg, oral, BID  heparin (porcine), 5,000 Units, subcutaneous, q8h  isosorbide mononitrate ER, 60 mg, oral, Daily  melatonin, 3 mg, oral, Daily  mirtazapine, 7.5 mg, oral, Nightly  [START ON 2/5/2024] pantoprazole, 40 mg, oral, Daily before breakfast   Or  [START ON 2/5/2024] pantoprazole, 40 mg, intravenous, Daily before breakfast  piperacillin-tazobactam, 2.25 g, intravenous, q8h  sevelamer carbonate, 800 mg, oral, TID with meals  tiotropium, 2 Inhalation, inhalation, Daily         Continuous  Medications:         PRN Medications:   PRN medications: acetaminophen **OR** acetaminophen **OR** acetaminophen, acetaminophen, guaiFENesin, ondansetron **OR** ondansetron, polyethylene glycol, traMADol    Review of Systems:  Review of Systems     Constitutional: not feeling poorly, no fever, no recent weight gain and no recent weight loss.   Eyes: no blurred vision and no diplopia.   ENT: no hearing loss, no tinnitus, no earache, no sore throat, no hoarseness and no swollen glands in the neck.   Cardiovascular: no chest pain, no tightness or heavy pressure, no shortness of breath, no palpitations and no lower extremity edema.   Respiratory: Cough  Gastrointestinal: no change in bowel habits, no diarrhea, no constipation, no bloody stools, no nausea, no vomiting, no abdominal pain, no signs and symptoms of ulcer disease, no kimberly colored stools and no intolerance to fatty foods.   Genitourinary: no urinary frequency, no dysuria, no hematuria, no burning sensation during urination, urinary stream is not smaller and urinary stream does not start and stop.   Musculoskeletal: no arthralgias, no joint stiffness, no muscle weakness, no back pain and no difficulty walking.   Skin: no rashes, no change in skin color and pigmentation, no skin lesions and no skin lumps.   Neurological: no headaches, no dizziness, no seizures, no tingling, no numbness, no signs and symptoms of stroke and no limb weakness.   Psychiatric: no confusion, no memory lapses or loss, no depression and no sleep disturbances.   Endocrineno excessive thirst, no dry skin, no cold intolerance, no heat intolerance and no increased urinary frequency.   Hematologic/Lymphatic: is not slow to heal, does not bleed easily, does not bruise easily, no thrombophlebitis, no anemia and no history of blood transfusion.     Objective   Vitals:  Most Recent:  Vitals:    02/04/24 1217   BP: (!) 152/48   Pulse: 75   Resp:    Temp:    SpO2:        24hr Min/Max:  Temp  Min:  36.4 °C (97.6 °F)  Max: 36.4 °C (97.6 °F)  Pulse  Min: 64  Max: 80  BP  Min: 62/45  Max: 159/69  Resp  Min: 12  Max: 27  SpO2  Min: 90 %  Max: 100 %    LDA:            Hemodynamic parameters for last 24 hours:         Intake/Output Summary (Last 24 hours) at 2/4/2024 1545  Last data filed at 2/3/2024 1844  Gross per 24 hour   Intake 1050 ml   Output --   Net 1050 ml           Physical exam:    Physical Exam   Constitutional   General appearance: Alert and in no acute distress.   Eyes   Inspection of eyes: Sclera and conjunctiva were normal.    Pupil exam: Pupils were equal in size. Extraocular movements were intact.   Pulmonary   Respiratory assessment: Basilar crackles on the right side  Cardiovascular   Auscultation of heart: Apical pulse normal, heart rate and rhythm normal, normal S1 and S2, no murmurs and no pericardial rub.    Exam for edema: No peripheral edema.   Abdomen   Abdominal Exam: No bruits, normal bowel sounds, soft, non-tender, no abdominal mass palpated.    Liver and Spleen exam: No hepato-splenomegaly.   Musculoskeletal   Examination of gait: Normal.    Inspection of digits and nails: No clubbing or cyanosis of the fingernails.    Inspection/palpation of joints, bones and muscles: No joint swelling. Normal movement of all extremities.   Skin   Skin inspection: Normal skin color and pigmentation, normal skin turgor and no visible rash.   Neurologic   Cranial nerves: Nerves 2-12 were intact, no focal neuro defects.   Psychiatric   Orientation: Oriented to person, place, and time.    Mood and affect: Normal.     Lab/Radiology/Diagnostic Review:  Results for orders placed or performed during the hospital encounter of 02/03/24 (from the past 24 hour(s))   CBC and Auto Differential   Result Value Ref Range    WBC 10.1 4.4 - 11.3 x10*3/uL    nRBC 0.0 0.0 - 0.0 /100 WBCs    RBC 4.42 4.00 - 5.20 x10*6/uL    Hemoglobin 12.6 12.0 - 16.0 g/dL    Hematocrit 41.6 36.0 - 46.0 %    MCV 94 80 - 100 fL    MCH 28.5  26.0 - 34.0 pg    MCHC 30.3 (L) 32.0 - 36.0 g/dL    RDW 15.9 (H) 11.5 - 14.5 %    Platelets 207 150 - 450 x10*3/uL    Neutrophils % 74.6 40.0 - 80.0 %    Immature Granulocytes %, Automated 0.7 0.0 - 0.9 %    Lymphocytes % 11.3 13.0 - 44.0 %    Monocytes % 9.9 2.0 - 10.0 %    Eosinophils % 3.2 0.0 - 6.0 %    Basophils % 0.3 0.0 - 2.0 %    Neutrophils Absolute 7.57 (H) 1.60 - 5.50 x10*3/uL    Immature Granulocytes Absolute, Automated 0.07 0.00 - 0.50 x10*3/uL    Lymphocytes Absolute 1.14 0.80 - 3.00 x10*3/uL    Monocytes Absolute 1.00 (H) 0.05 - 0.80 x10*3/uL    Eosinophils Absolute 0.32 0.00 - 0.40 x10*3/uL    Basophils Absolute 0.03 0.00 - 0.10 x10*3/uL   Magnesium   Result Value Ref Range    Magnesium 2.30 1.60 - 2.40 mg/dL   Comprehensive metabolic panel   Result Value Ref Range    Glucose 98 74 - 99 mg/dL    Sodium 130 (L) 136 - 145 mmol/L    Potassium 4.0 3.5 - 5.3 mmol/L    Chloride 90 (L) 98 - 107 mmol/L    Bicarbonate 29 21 - 32 mmol/L    Anion Gap 15 10 - 20 mmol/L    Urea Nitrogen 19 6 - 23 mg/dL    Creatinine 3.74 (H) 0.50 - 1.05 mg/dL    eGFR 11 (L) >60 mL/min/1.73m*2    Calcium 9.0 8.6 - 10.3 mg/dL    Albumin 3.5 3.4 - 5.0 g/dL    Alkaline Phosphatase 223 (H) 33 - 136 U/L    Total Protein 8.3 (H) 6.4 - 8.2 g/dL    AST 19 9 - 39 U/L    Bilirubin, Total 0.6 0.0 - 1.2 mg/dL    ALT 24 7 - 45 U/L   Protime-INR   Result Value Ref Range    Protime 12.8 9.8 - 12.8 seconds    INR 1.1 0.9 - 1.1   B-Type Natriuretic Peptide   Result Value Ref Range     (H) 0 - 99 pg/mL   Troponin I, High Sensitivity, Initial   Result Value Ref Range    Troponin I, High Sensitivity 21 (H) 0 - 13 ng/L   Lactate   Result Value Ref Range    Lactate 1.0 0.4 - 2.0 mmol/L   Blood Culture    Specimen: Peripheral Venipuncture; Blood culture   Result Value Ref Range    Blood Culture Loaded on Instrument - Culture in progress    Blood Culture    Specimen: Peripheral Venipuncture; Blood culture   Result Value Ref Range    Blood Culture  Loaded on Instrument - Culture in progress    BLOOD GAS VENOUS FULL PANEL   Result Value Ref Range    POCT pH, Venous 7.52 (H) 7.33 - 7.43 pH    POCT pCO2, Venous 44 41 - 51 mm Hg    POCT pO2, Venous 51 (H) 35 - 45 mm Hg    POCT SO2, Venous 78 (H) 45 - 75 %    POCT Oxy Hemoglobin, Venous 76.1 (H) 45.0 - 75.0 %    POCT Hematocrit Calculated, Venous 41.0 36.0 - 46.0 %    POCT Sodium, Venous 127 (L) 136 - 145 mmol/L    POCT Potassium, Venous 4.4 3.5 - 5.3 mmol/L    POCT Chloride, Venous 94 (L) 98 - 107 mmol/L    POCT Ionized Calicum, Venous 1.13 1.10 - 1.33 mmol/L    POCT Glucose, Venous 110 (H) 74 - 99 mg/dL    POCT Lactate, Venous 1.5 0.4 - 2.0 mmol/L    POCT Base Excess, Venous 11.6 (H) -2.0 - 3.0 mmol/L    POCT HCO3 Calculated, Venous 35.9 (H) 22.0 - 26.0 mmol/L    POCT Hemoglobin, Venous 13.5 12.0 - 16.0 g/dL    POCT Anion Gap, Venous 2.0 (L) 10.0 - 25.0 mmol/L    Patient Temperature 37.0 degrees Celsius    FiO2 21 %   Sars-CoV-2 and Influenza A/B PCR   Result Value Ref Range    Flu A Result Not Detected Not Detected    Flu B Result Not Detected Not Detected    Coronavirus 2019, PCR Not Detected Not Detected   MRSA Surveillance for Vancomycin De-escalation, PCR    Specimen: Anterior Nares; Swab   Result Value Ref Range    MRSA PCR Not Detected Not Detected   Troponin, High Sensitivity, 1 Hour   Result Value Ref Range    Troponin I, High Sensitivity 18 (H) 0 - 13 ng/L   Phosphorus   Result Value Ref Range    Phosphorus 3.6 2.5 - 4.9 mg/dL     XR chest 1 view    Result Date: 2/3/2024  STUDY: Chest Radiograph;  2/3/2024 5:22 PM INDICATION: Syncope. COMPARISON: 1/30/2024 XR chest. ACCESSION NUMBER(S): [id number] ORDERING CLINICIAN: KAYLEEN KINSEY TECHNIQUE:  Frontal chest was obtained at 17:21 hours. FINDINGS: CARDIOMEDIASTINAL SILHOUETTE: Cardiomediastinal silhouette is normal in size and configuration.  LUNGS: There are diffuse interstitial changes unchanged from prior exams.  In addition, there is a new  infiltrate in the right lung base.  ABDOMEN: No remarkable upper abdominal findings.  BONES: No acute osseous changes.    Right lower lobe infiltrate on a background of interstitial change. Signed by Lawson Viveros MD    XR chest 2 views    Result Date: 1/30/2024  STUDY: Chest Radiographs;  1/30/2024 at 4:00 PM. INDICATION: Cough. COMPARISON: XR chest 1/30/2023, 1/26/2023, 1/12/2023. ACCESSION NUMBER(S): [id number] ORDERING CLINICIAN: SHELLI SANTOS TECHNIQUE:  Frontal and lateral chest. FINDINGS: CARDIOMEDIASTINAL SILHOUETTE: Cardiomediastinal silhouette is mildly enlarged but stable.  LUNGS: Mild chronic interstitial changes noted.  ABDOMEN: No remarkable upper abdominal findings.  BONES: No acute osseous changes.    Mild unchanged cardiomegaly with chronic interstitial changes. No acute process. Signed by Mil Ford MD      Assessment/Plan   Principal Problem:    Hypotension    Alyce Dougherty is a 83 y.o. female  past medical history of HTN, HLD, ESRD on hemodialysis (T, Th, Sat which will be changed to MWF starting this week), HFrEF, PAD, GERD, Anemia of Chronic Disease, history of Breast CA status postlumpectomy s/p displaced fractures of left superior and inferior pubic rami after a fall, Pseudogout, Osteoarthritis -nephrology was consulted because of end-stage renal disease on hemodialysis.  Patient goes to [facility] Tuesday Thursday Saturday, her dialysis schedule at outpatient will be changed to Monday Wednesday Friday starting this week.   Patient was admitted to the hospital because of hypotension and pneumonia    End-stage renal disease on hemodialysis  Patient goes to [facility] Tuesday Thursday Saturday, her dialysis schedule at outpatient will be changed to Monday Wednesday Friday starting this week.   Patient was admitted to the hospital because of hypotension and pneumonia  As per the new schedule of hemodialysis ordered intermittent hemodialysis for tomorrow 2/5/2024  Ordered hemodialysis for  3 hours with F1 60 dialyzer and 2K bath  As the patient blood pressure is borderline decreased healthy temperature to 36 degrees, blood flow to 300 and will attempt 1 L ultrafiltration    2.  Anemia of chronic disease  Hemoglobin more than 10.5, no indication for EPO    3.  Hyponatremia  Hyponatremia will be corrected with intermittent hemodialysis  Phosphorus and magnesium within normal limits    I spent 75 minutes of cumulative time with the patient.  Greater than 50% of that time was spent in the direct collaboration and or coordination of care of the patient.     Dragon dictation software was used to dictate this note and thus there may be minor errors in translation/transcription including garbled speech or misspellings. Please contact for clarification if needed.    Marshall Rubi MD

## 2024-02-05 ENCOUNTER — APPOINTMENT (OUTPATIENT)
Dept: DIALYSIS | Facility: HOSPITAL | Age: 84
End: 2024-02-05
Payer: COMMERCIAL

## 2024-02-05 ENCOUNTER — TELEPHONE (OUTPATIENT)
Dept: PRIMARY CARE | Facility: CLINIC | Age: 84
End: 2024-02-05

## 2024-02-05 ENCOUNTER — APPOINTMENT (OUTPATIENT)
Dept: PRIMARY CARE | Facility: CLINIC | Age: 84
End: 2024-02-05
Payer: COMMERCIAL

## 2024-02-05 VITALS
OXYGEN SATURATION: 97 % | WEIGHT: 119.1 LBS | HEIGHT: 66 IN | DIASTOLIC BLOOD PRESSURE: 53 MMHG | SYSTOLIC BLOOD PRESSURE: 148 MMHG | HEART RATE: 82 BPM | RESPIRATION RATE: 18 BRPM | BODY MASS INDEX: 19.14 KG/M2 | TEMPERATURE: 98.7 F

## 2024-02-05 LAB
ANION GAP SERPL CALC-SCNC: 14 MMOL/L (ref 10–20)
BUN SERPL-MCNC: 15 MG/DL (ref 6–23)
CALCIUM SERPL-MCNC: 8.1 MG/DL (ref 8.6–10.3)
CHLORIDE SERPL-SCNC: 94 MMOL/L (ref 98–107)
CO2 SERPL-SCNC: 27 MMOL/L (ref 21–32)
CREAT SERPL-MCNC: 3.33 MG/DL (ref 0.5–1.05)
EGFRCR SERPLBLD CKD-EPI 2021: 13 ML/MIN/1.73M*2
ERYTHROCYTE [DISTWIDTH] IN BLOOD BY AUTOMATED COUNT: 15.6 % (ref 11.5–14.5)
GLUCOSE SERPL-MCNC: 117 MG/DL (ref 74–99)
HCT VFR BLD AUTO: 41 % (ref 36–46)
HGB BLD-MCNC: 12.4 G/DL (ref 12–16)
MCH RBC QN AUTO: 29.6 PG (ref 26–34)
MCHC RBC AUTO-ENTMCNC: 30.2 G/DL (ref 32–36)
MCV RBC AUTO: 98 FL (ref 80–100)
NRBC BLD-RTO: 0 /100 WBCS (ref 0–0)
PLATELET # BLD AUTO: 165 X10*3/UL (ref 150–450)
POTASSIUM SERPL-SCNC: 4 MMOL/L (ref 3.5–5.3)
RBC # BLD AUTO: 4.19 X10*6/UL (ref 4–5.2)
SODIUM SERPL-SCNC: 131 MMOL/L (ref 136–145)
WBC # BLD AUTO: 9.2 X10*3/UL (ref 4.4–11.3)

## 2024-02-05 PROCEDURE — 2500000004 HC RX 250 GENERAL PHARMACY W/ HCPCS (ALT 636 FOR OP/ED): Performed by: INTERNAL MEDICINE

## 2024-02-05 PROCEDURE — 80048 BASIC METABOLIC PNL TOTAL CA: CPT | Performed by: INTERNAL MEDICINE

## 2024-02-05 PROCEDURE — 36415 COLL VENOUS BLD VENIPUNCTURE: CPT | Performed by: INTERNAL MEDICINE

## 2024-02-05 PROCEDURE — 94640 AIRWAY INHALATION TREATMENT: CPT

## 2024-02-05 PROCEDURE — 85027 COMPLETE CBC AUTOMATED: CPT | Performed by: INTERNAL MEDICINE

## 2024-02-05 PROCEDURE — 86706 HEP B SURFACE ANTIBODY: CPT | Mod: AHULAB | Performed by: INTERNAL MEDICINE

## 2024-02-05 PROCEDURE — 87340 HEPATITIS B SURFACE AG IA: CPT | Mod: AHULAB | Performed by: INTERNAL MEDICINE

## 2024-02-05 PROCEDURE — 2500000002 HC RX 250 W HCPCS SELF ADMINISTERED DRUGS (ALT 637 FOR MEDICARE OP, ALT 636 FOR OP/ED): Performed by: INTERNAL MEDICINE

## 2024-02-05 PROCEDURE — 5A1D70Z PERFORMANCE OF URINARY FILTRATION, INTERMITTENT, LESS THAN 6 HOURS PER DAY: ICD-10-PCS | Performed by: INTERNAL MEDICINE

## 2024-02-05 PROCEDURE — 8010000001 HC DIALYSIS - HEMODIALYSIS PER DAY

## 2024-02-05 PROCEDURE — 2500000001 HC RX 250 WO HCPCS SELF ADMINISTERED DRUGS (ALT 637 FOR MEDICARE OP): Performed by: INTERNAL MEDICINE

## 2024-02-05 PROCEDURE — 99239 HOSP IP/OBS DSCHRG MGMT >30: CPT | Performed by: INTERNAL MEDICINE

## 2024-02-05 RX ORDER — CARVEDILOL 6.25 MG/1
6.25 TABLET ORAL 2 TIMES DAILY
Status: DISCONTINUED | OUTPATIENT
Start: 2024-02-05 | End: 2024-02-05 | Stop reason: HOSPADM

## 2024-02-05 RX ORDER — GUAIFENESIN 600 MG/1
600 TABLET, EXTENDED RELEASE ORAL EVERY 12 HOURS PRN
Qty: 14 TABLET | Refills: 0 | Status: SHIPPED | OUTPATIENT
Start: 2024-02-05 | End: 2024-02-13 | Stop reason: SDUPTHER

## 2024-02-05 RX ORDER — AMOXICILLIN AND CLAVULANATE POTASSIUM 250; 125 MG/1; MG/1
1 TABLET, FILM COATED ORAL 2 TIMES DAILY
Qty: 10 TABLET | Refills: 0 | Status: SHIPPED | OUTPATIENT
Start: 2024-02-05 | End: 2024-02-17 | Stop reason: HOSPADM

## 2024-02-05 RX ORDER — CARVEDILOL 6.25 MG/1
6.25 TABLET ORAL 2 TIMES DAILY
Qty: 30 TABLET | Refills: 0 | Status: SHIPPED | OUTPATIENT
Start: 2024-02-05 | End: 2024-02-13 | Stop reason: SDUPTHER

## 2024-02-05 RX ORDER — ALBUTEROL SULFATE 0.83 MG/ML
2.5 SOLUTION RESPIRATORY (INHALATION) EVERY 2 HOUR PRN
Status: DISCONTINUED | OUTPATIENT
Start: 2024-02-05 | End: 2024-02-05 | Stop reason: HOSPADM

## 2024-02-05 RX ORDER — LIDOCAINE AND PRILOCAINE 25; 25 MG/G; MG/G
CREAM TOPICAL AS NEEDED
Status: DISCONTINUED | OUTPATIENT
Start: 2024-02-05 | End: 2024-02-05 | Stop reason: HOSPADM

## 2024-02-05 RX ADMIN — HEPARIN SODIUM 5000 UNITS: 5000 INJECTION INTRAVENOUS; SUBCUTANEOUS at 04:00

## 2024-02-05 RX ADMIN — SEVELAMER CARBONATE 800 MG: 800 TABLET, FILM COATED ORAL at 14:35

## 2024-02-05 RX ADMIN — CARVEDILOL 6.25 MG: 6.25 TABLET, FILM COATED ORAL at 14:35

## 2024-02-05 RX ADMIN — ATORVASTATIN CALCIUM 80 MG: 80 TABLET, FILM COATED ORAL at 14:35

## 2024-02-05 RX ADMIN — PIPERACILLIN SODIUM AND TAZOBACTAM SODIUM 2.25 G: 2; .25 INJECTION, SOLUTION INTRAVENOUS at 14:35

## 2024-02-05 RX ADMIN — PIPERACILLIN SODIUM AND TAZOBACTAM SODIUM 2.25 G: 2; .25 INJECTION, SOLUTION INTRAVENOUS at 03:58

## 2024-02-05 RX ADMIN — TIOTROPIUM BROMIDE INHALATION SPRAY 2 PUFF: 3.12 SPRAY, METERED RESPIRATORY (INHALATION) at 07:31

## 2024-02-05 RX ADMIN — ASPIRIN 81 MG: 81 TABLET, COATED ORAL at 14:35

## 2024-02-05 RX ADMIN — ISOSORBIDE MONONITRATE 60 MG: 30 TABLET, EXTENDED RELEASE ORAL at 14:35

## 2024-02-05 RX ADMIN — HEPARIN SODIUM 5000 UNITS: 5000 INJECTION INTRAVENOUS; SUBCUTANEOUS at 14:35

## 2024-02-05 ASSESSMENT — COGNITIVE AND FUNCTIONAL STATUS - GENERAL
WALKING IN HOSPITAL ROOM: A LITTLE
MOBILITY SCORE: 22
PERSONAL GROOMING: A LITTLE
TOILETING: A LITTLE
HELP NEEDED FOR BATHING: A LITTLE
DRESSING REGULAR UPPER BODY CLOTHING: A LITTLE
CLIMB 3 TO 5 STEPS WITH RAILING: A LITTLE
DAILY ACTIVITIY SCORE: 20

## 2024-02-05 ASSESSMENT — PAIN SCALES - GENERAL: PAINLEVEL_OUTOF10: 0 - NO PAIN

## 2024-02-05 ASSESSMENT — PAIN - FUNCTIONAL ASSESSMENT
PAIN_FUNCTIONAL_ASSESSMENT: NO/DENIES PAIN
PAIN_FUNCTIONAL_ASSESSMENT: NO/DENIES PAIN

## 2024-02-05 NOTE — TELEPHONE ENCOUNTER
Pt son called lucita asked for a call back from dr. Corado stated he just wanted to know what was going on with his mother she wasn't telling him everything .patient is getting discharged today but still ask for a call back

## 2024-02-05 NOTE — PROGRESS NOTES
Report to Receiving RN:    Report To: Avani Bryan  Time Report Called: 1300  Hand-Off Communication: pt stable, had 1 L of fluid removal, post /66 HR 71  Complications During Treatment: No  Ultrafiltration Treatment: No  Medications Administered During Dialysis: No  Blood Products Administered During Dialysis: No  Labs Sent During Dialysis: No  Heparin Drip Rate Changes: No    Electronic Signatures:   (Signed Jackson Bennett RN)   Authored:    (Signed )   Authored:     Last Updated: 1:05 PM by JACKSON BENNETT

## 2024-02-05 NOTE — CARE PLAN
Attempted  to see patient 2x today, once this morning and again just now, at HD.    Patient admitted for AMS after last HD session, found to have LLL PNA.    Per notes HD T TH S    1421- met with patient at bedside  Explained role of TCC  Patient states she lives with her son and daughter in law and daughter in law assists with bathing, other than that patients states she is independent with caring for herself, uses a cane and walker, no longer drives, son provides transport to appts and to HD @ [facility] now on MWF. Patient denies need for HHC and is anxious to dc and get home.     ADOD today/tomorrow  DC home no needs, will call Dr Corado office (PCP) if decides she want HHC.

## 2024-02-05 NOTE — PROCEDURES
December 15, 2023     Patient: Constantin Costa   YOB: 2008   Date of Visit: 12/15/2023       To Whom it May Concern:    Constantin Costa was seen in my clinic on 12/15/2023 at 11:00 am.     Please excuse Constantin for his absence from school on the date listed above to be able to make his appointment.    Constantin may return to school on Monday, 12/18/2023.    Sincerely,          Miguelito Taylor, DO    Medical information is confidential and cannot be disclosed without the written consent of the patient or his representative.       Seen on dialysis. F160 kidney x 3 hours, BFR//500 mL/minute, 2K bath, 2.5 calcium, 35 bicarbonate with a target UF set for 1 L as tolerated. Her hemoglobin is at target therefore no need for erythropoietin. Phosphorus binder is ordered. Continue dialysis per orders.         Constitutional: Well developed, awake/alert/oriented x3, no distress, alert and cooperative   Eyes: No icterus or chemosis   ENMT: mucous membranes moist   Head/Neck: + JVD   Respiratory/Thorax: Patent airways, CTAB, normal breath sounds with good chest expansion, thorax symmetric   Cardiovascular: Regular, rate and rhythm, no murmurs,  normal S 1and S 2  LUE AVF WNL   Gastrointestinal: Nondistended, soft, non-tender,  no rebound tenderness or guarding   Musculoskeletal: No synovitis   Extremities: No leg edema   Neurological: Moves 4 ext   Psychological: Appropriate mood and behavior   Skin: Warm and dry, no lesions, no rashes to exposed  skin

## 2024-02-05 NOTE — DISCHARGE SUMMARY
Discharge Diagnosis  Hypotension    Issues Requiring Follow-Up      Test Results Pending At Discharge  Pending Labs       Order Current Status    Blood Culture Preliminary result    Blood Culture Preliminary result            Hospital Course      Pertinent Physical Exam At Time of Discharge  Physical Exam    Home Medications     Medication List      START taking these medications     amoxicillin-pot clavulanate 250-125 mg tablet; Commonly known as:   Augmentin; Take 1 tablet (250 mg) by mouth 2 times a day for 5 days.   guaiFENesin 600 mg 12 hr tablet; Commonly known as: Mucinex; Take 1   tablet (600 mg) by mouth every 12 hours if needed for cough for up to 7   days. Do not crush, chew, or split.     CHANGE how you take these medications     carvedilol 6.25 mg tablet; Commonly known as: Coreg; Take 1 tablet (6.25   mg) by mouth 2 times a day. Please note decreased dose; What changed:   medication strength, how much to take, additional instructions   sevelamer HCl 800 mg tablet; Commonly known as: Renagel; Take 1 tablet   (800 mg) by mouth once daily. Before meals; What changed: when to take   this     CONTINUE taking these medications     aspirin 81 mg EC tablet   atorvastatin 80 mg tablet; Commonly known as: Lipitor; Take 1 tablet (80   mg) by mouth once daily.   gabapentin 100 mg capsule; Commonly known as: Neurontin; Take 1 capsule   (100 mg) by mouth once daily at bedtime.   isosorbide mononitrate ER 30 mg 24 hr tablet; Commonly known as: Imdur;   Take 2 tablets (60 mg) by mouth once daily.   magnesium oxide 400 mg (241.3 mg magnesium) tablet; Commonly known as:   Mag-Ox   melatonin 3 mg tablet   mirtazapine 7.5 mg tablet; Commonly known as: Remeron; Take 1 tablet   (7.5 mg) by mouth once daily at bedtime.   tiotropium 18 mcg inhalation capsule; Commonly known as: Spiriva with   HandiHaler; Place 1 capsule (18 mcg) into inhaler and inhale once daily at   bedtime.     STOP taking these medications     amLODIPine  5 mg tablet; Commonly known as: Norvasc   azithromycin 250 mg tablet; Commonly known as: Zithromax   furosemide 40 mg tablet; Commonly known as: Lasix       Outpatient Follow-Up  No future appointments.    Chanelle Corado MD

## 2024-02-05 NOTE — PROGRESS NOTES
Report from Sending RN:    Report From: Avani Bryan  Recent Surgery of Procedure: No  Baseline Level of Consciousness (LOC): AOx3  Oxygen Use: No  Type: N/A  Diabetic: No  Last BP Med Given Day of Dialysis: See EMAR  Last Pain Med Given: See EMAR  Lab Tests to be Obtained with Dialysis: No  Blood Transfusion to be Given During Dialysis: No  Available IV Access: Yes  Medications to be Administered During Dialysis: No  Continuous IV Infusion Running: No  Restraints on Currently or in the Last 24 Hours: No  Hand-Off Communication: Pt stable and ready to come to PACU, pt requesting EMLA cream for cannulation

## 2024-02-05 NOTE — DISCHARGE SUMMARY
Discharge Diagnosis  Hypotension    Issues Requiring Follow-Up  Follow-up in clinic    Test Results Pending At Discharge  Pending Labs       Order Current Status    Hepatitis B surface antibody In process    Hepatitis B surface antigen In process    Blood Culture Preliminary result    Blood Culture Preliminary result            Hospital Course  Patient with a history of high blood pressure incisional disease admitted with weakness lethargy and low blood pressure postdialysis  Workup showed pneumonia  Started IV Zosyn  Blood pressure medicines adjusted  Patient feels much better and will discharge home on oral Augmentin  She did tolerate dialysis well and blood pressure remained stable  Stop the patient's amlodipine and adjusted the carvedilol        Pertinent Physical Exam At Time of Discharge  Physical Exam  Constitutional   General appearance: Alert and in no acute distress.     Pulmonary   Respiratory assessment: No respiratory distress, normal respiratory rhythm and effort.    Auscultation of Lungs: Clear bilateral breath sounds.   Cardiovascular   Auscultation of heart: Apical pulse normal, heart rate and rhythm normal, normal S1 and S2, no murmurs and no pericardial rub.    Exam for edema: No peripheral edema.   Abdomen   Abdominal Exam: No bruits, normal bowel sounds, soft, non-tender, no abdominal mass palpated.    Liver and Spleen exam: No hepato-splenomegaly.   Musculoskeletal   Examination of gait: Normal.    Inspection of digits and nails: No clubbing or cyanosis of the fingernails.    Inspection/palpation of joints, bones and muscles: No joint swelling. Normal movement of all extremities.   Skin   Skin inspection: Normal skin color and pigmentation, normal skin turgor and no visible rash.   Neurologic   Cranial nerves: Nerves 2-12 were intact, no focal neuro defects.       Home Medications     Medication List      START taking these medications     amoxicillin-pot clavulanate 250-125 mg tablet; Commonly  known as:   Augmentin; Take 1 tablet (250 mg) by mouth 2 times a day for 5 days.   guaiFENesin 600 mg 12 hr tablet; Commonly known as: Mucinex; Take 1   tablet (600 mg) by mouth every 12 hours if needed for cough for up to 7   days. Do not crush, chew, or split.     CHANGE how you take these medications     carvedilol 6.25 mg tablet; Commonly known as: Coreg; Take 1 tablet (6.25   mg) by mouth 2 times a day. Please note decreased dose; What changed:   medication strength, how much to take, additional instructions   sevelamer HCl 800 mg tablet; Commonly known as: Renagel; Take 1 tablet   (800 mg) by mouth once daily. Before meals; What changed: when to take   this     CONTINUE taking these medications     aspirin 81 mg EC tablet   atorvastatin 80 mg tablet; Commonly known as: Lipitor; Take 1 tablet (80   mg) by mouth once daily.   gabapentin 100 mg capsule; Commonly known as: Neurontin; Take 1 capsule   (100 mg) by mouth once daily at bedtime.   isosorbide mononitrate ER 30 mg 24 hr tablet; Commonly known as: Imdur;   Take 2 tablets (60 mg) by mouth once daily.   magnesium oxide 400 mg (241.3 mg magnesium) tablet; Commonly known as:   Mag-Ox   melatonin 3 mg tablet   mirtazapine 7.5 mg tablet; Commonly known as: Remeron; Take 1 tablet   (7.5 mg) by mouth once daily at bedtime.   tiotropium 18 mcg inhalation capsule; Commonly known as: Spiriva with   HandiHaler; Place 1 capsule (18 mcg) into inhaler and inhale once daily at   bedtime.     STOP taking these medications     amLODIPine 5 mg tablet; Commonly known as: Norvasc   azithromycin 250 mg tablet; Commonly known as: Zithromax   furosemide 40 mg tablet; Commonly known as: Lasix       Outpatient Follow-Up  No future appointments.    Patient seen at bedside. Events from the last visit reviewed. Discussed with staff. Results of tests and investigations from last visit reviewed and discussed with patient/Family. Electronic chart on [facility] reviewed. Input /  Recommendations  from consultants  appreciated and reviewed and agreed with.     discharge summary and profile completed. medications reviewed and discussed with patient and family.  scripts completed and signed.     total discharge time in excess of 30 minutes.    Chanelle Corado MD

## 2024-02-06 ENCOUNTER — PATIENT OUTREACH (OUTPATIENT)
Dept: CARE COORDINATION | Facility: CLINIC | Age: 84
End: 2024-02-06
Payer: COMMERCIAL

## 2024-02-06 LAB
ATRIAL RATE: 74 BPM
HBV SURFACE AB SER-ACNC: 130.2 MIU/ML
HBV SURFACE AG SERPL QL IA: NONREACTIVE
P AXIS: 36 DEGREES
P OFFSET: 160 MS
P ONSET: 105 MS
PR INTERVAL: 222 MS
Q ONSET: 216 MS
QRS COUNT: 12 BEATS
QRS DURATION: 96 MS
QT INTERVAL: 412 MS
QTC CALCULATION(BAZETT): 457 MS
QTC FREDERICIA: 442 MS
R AXIS: 8 DEGREES
T AXIS: -35 DEGREES
T OFFSET: 422 MS
VENTRICULAR RATE: 74 BPM

## 2024-02-06 NOTE — PROGRESS NOTES
Discharge Facility:[facility]  Discharge Diagnosis:Hypotension,pneumonia  Admission Date:02/04/04  Discharge Date: 02/05/24    PCP Appointment Date:none available sent to pcp office  Specialist Appointment Date:   Hospital Encounter and Summary: Linked   See discharge assessment below for further details  Engagement  Call Start Time: 0825 (2/6/2024  8:25 AM)    Medications  Medications reviewed with patient/caregiver?: Yes (amox/clav 250/125mg and musinex) (2/6/2024  8:25 AM)  Is the patient having any side effects they believe may be caused by any medication additions or changes?: No (2/6/2024  8:25 AM)  Does the patient have all medications ordered at discharge?: Yes (2/6/2024  8:25 AM)  Is the patient taking all medications as directed (includes completed medication regime)?: Yes (2/6/2024  8:25 AM)    Appointments  Does the patient have a primary care provider?: Yes (2/6/2024  8:25 AM)  Care Management Interventions: Advised patient to make appointment (2/1/2024  8:40 AM)  Care Management Interventions: Advised patient to keep appointment (2/6/2024  8:25 AM)    Self Management  Has home health visited the patient within 72 hours of discharge?: No (2/6/2024  8:25 AM)  Has all Durable Medical Equipment (DME) been delivered?: No (2/6/2024  8:25 AM)    Patient Teaching  Does the patient have access to their discharge instructions?: Yes (2/6/2024  8:25 AM)  Care Management Interventions: Reviewed instructions with patient (reviewed with caregiver) (2/6/2024  8:25 AM)  What is the patient's perception of their health status since discharge?: Same (still coughing bad per her caregiver.) (2/6/2024  8:25 AM)    Wrap Up  Call End Time: 0835 (2/6/2024  8:25 AM)

## 2024-02-08 DIAGNOSIS — I50.9 CHF, STAGE C (MULTI): Primary | ICD-10-CM

## 2024-02-08 LAB
BACTERIA BLD CULT: NORMAL
BACTERIA BLD CULT: NORMAL

## 2024-02-13 ENCOUNTER — OFFICE VISIT (OUTPATIENT)
Dept: PRIMARY CARE | Facility: CLINIC | Age: 84
End: 2024-02-13
Payer: COMMERCIAL

## 2024-02-13 VITALS — BODY MASS INDEX: 19.69 KG/M2 | WEIGHT: 122 LBS | TEMPERATURE: 98 F

## 2024-02-13 DIAGNOSIS — I50.9 CHF, STAGE C (MULTI): ICD-10-CM

## 2024-02-13 DIAGNOSIS — G89.29 CHRONIC LOW BACK PAIN WITH SCIATICA, SCIATICA LATERALITY UNSPECIFIED, UNSPECIFIED BACK PAIN LATERALITY: ICD-10-CM

## 2024-02-13 DIAGNOSIS — M54.40 CHRONIC LOW BACK PAIN WITH SCIATICA, SCIATICA LATERALITY UNSPECIFIED, UNSPECIFIED BACK PAIN LATERALITY: ICD-10-CM

## 2024-02-13 DIAGNOSIS — I10 ESSENTIAL HYPERTENSION: Chronic | ICD-10-CM

## 2024-02-13 DIAGNOSIS — J18.9 PNEUMONIA OF RIGHT LOWER LOBE DUE TO INFECTIOUS ORGANISM: ICD-10-CM

## 2024-02-13 DIAGNOSIS — E78.49 OTHER HYPERLIPIDEMIA: ICD-10-CM

## 2024-02-13 PROBLEM — I25.10 CORONARY ARTERY DISEASE: Status: ACTIVE | Noted: 2017-09-19

## 2024-02-13 PROBLEM — S32.599A FRACTURE OF MULTIPLE PUBIC RAMI (MULTI): Status: ACTIVE | Noted: 2024-02-13

## 2024-02-13 PROBLEM — K62.5 RECTAL HEMORRHAGE: Status: ACTIVE | Noted: 2023-05-17

## 2024-02-13 PROBLEM — I34.0 NON-RHEUMATIC MITRAL REGURGITATION: Status: ACTIVE | Noted: 2017-09-19

## 2024-02-13 PROBLEM — N04.9 ANASARCA ASSOCIATED WITH DISORDER OF KIDNEY: Status: ACTIVE | Noted: 2024-02-13

## 2024-02-13 PROBLEM — R26.81 UNSTEADINESS ON FEET: Status: ACTIVE | Noted: 2020-10-14

## 2024-02-13 PROBLEM — K92.1 MELENA: Status: ACTIVE | Noted: 2020-10-13

## 2024-02-13 PROBLEM — M79.652 BILATERAL THIGH PAIN: Status: ACTIVE | Noted: 2020-09-02

## 2024-02-13 PROBLEM — J90 EXUDATIVE PLEURAL EFFUSION: Status: ACTIVE | Noted: 2023-01-12

## 2024-02-13 PROBLEM — K57.30 DIVERTICULOSIS OF COLON: Status: ACTIVE | Noted: 2024-02-13

## 2024-02-13 PROBLEM — M46.20 OSTEOMYELITIS OF SPINE (MULTI): Status: ACTIVE | Noted: 2020-09-02

## 2024-02-13 PROBLEM — Z99.2 DEPENDENCE ON RENAL DIALYSIS (CMS-HCC): Status: ACTIVE | Noted: 2020-09-29

## 2024-02-13 PROBLEM — K68.12 PSOAS ABSCESS (MULTI): Status: ACTIVE | Noted: 2020-09-02

## 2024-02-13 PROBLEM — D86.9 SARCOIDOSIS: Status: ACTIVE | Noted: 2024-02-13

## 2024-02-13 PROBLEM — E43 UNSPECIFIED SEVERE PROTEIN-CALORIE MALNUTRITION (MULTI): Status: ACTIVE | Noted: 2023-02-01

## 2024-02-13 PROBLEM — J44.9 CHRONIC OBSTRUCTIVE PULMONARY DISEASE (MULTI): Status: ACTIVE | Noted: 2023-03-14

## 2024-02-13 PROBLEM — K83.8 ACQUIRED DILATION OF BILE DUCT: Status: ACTIVE | Noted: 2024-02-13

## 2024-02-13 PROBLEM — Z99.2 END-STAGE RENAL DISEASE ON HEMODIALYSIS (MULTI): Status: ACTIVE | Noted: 2020-07-09

## 2024-02-13 PROBLEM — K92.2 ACUTE LOWER GI BLEEDING: Status: ACTIVE | Noted: 2020-11-28

## 2024-02-13 PROBLEM — I49.9 ARRHYTHMIA: Status: ACTIVE | Noted: 2023-05-17

## 2024-02-13 PROBLEM — R26.2 DIFFICULTY IN WALKING, NOT ELSEWHERE CLASSIFIED: Status: ACTIVE | Noted: 2020-10-14

## 2024-02-13 PROBLEM — M62.81 MUSCLE WEAKNESS (GENERALIZED): Status: ACTIVE | Noted: 2020-10-14

## 2024-02-13 PROBLEM — K92.2 GASTROINTESTINAL HEMORRHAGE, UNSPECIFIED: Status: ACTIVE | Noted: 2020-10-13

## 2024-02-13 PROBLEM — M46.46 DISCITIS, UNSPECIFIED, LUMBAR REGION: Status: ACTIVE | Noted: 2020-10-13

## 2024-02-13 PROBLEM — N19 RENAL FAILURE: Status: ACTIVE | Noted: 2024-02-13

## 2024-02-13 PROBLEM — Z97.4 WEARS HEARING AID IN BOTH EARS: Status: ACTIVE | Noted: 2019-12-18

## 2024-02-13 PROBLEM — M79.651 BILATERAL THIGH PAIN: Status: ACTIVE | Noted: 2020-09-02

## 2024-02-13 PROBLEM — H90.3 SENSORINEURAL HEARING LOSS (SNHL), BILATERAL: Status: ACTIVE | Noted: 2019-10-31

## 2024-02-13 PROBLEM — Z20.822 CONTACT WITH AND (SUSPECTED) EXPOSURE TO COVID-19: Status: ACTIVE | Noted: 2023-02-01

## 2024-02-13 PROBLEM — Z86.79 HISTORY OF HYPERTENSION: Status: ACTIVE | Noted: 2024-02-13

## 2024-02-13 PROBLEM — M79.89 SWELLING OF UPPER EXTREMITY: Status: ACTIVE | Noted: 2024-02-13

## 2024-02-13 PROBLEM — G93.41 METABOLIC ENCEPHALOPATHY: Status: ACTIVE | Noted: 2024-02-13

## 2024-02-13 PROBLEM — R94.31 T WAVE INVERSION IN ELECTROCARDIOGRAM: Status: ACTIVE | Noted: 2024-02-13

## 2024-02-13 PROBLEM — T82.838A HEMORRHAGE OF ARTERIOVENOUS FISTULA (CMS-HCC): Status: ACTIVE | Noted: 2024-02-13

## 2024-02-13 PROBLEM — Z97.4 PRESENCE OF EXTERNAL HEARING-AID: Status: ACTIVE | Noted: 2020-09-29

## 2024-02-13 PROBLEM — K62.5 RECTAL BLEED: Status: ACTIVE | Noted: 2021-01-04

## 2024-02-13 PROBLEM — N18.6 END-STAGE RENAL DISEASE ON HEMODIALYSIS (MULTI): Status: ACTIVE | Noted: 2020-07-09

## 2024-02-13 PROBLEM — Z86.39 HISTORY OF ELEVATED LIPIDS: Status: ACTIVE | Noted: 2024-02-13
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
## 2024-02-13 PROCEDURE — 1126F AMNT PAIN NOTED NONE PRSNT: CPT | Performed by: INTERNAL MEDICINE

## 2024-02-13 PROCEDURE — 1159F MED LIST DOCD IN RCRD: CPT | Performed by: INTERNAL MEDICINE

## 2024-02-13 PROCEDURE — 1111F DSCHRG MED/CURRENT MED MERGE: CPT | Performed by: INTERNAL MEDICINE

## 2024-02-13 PROCEDURE — 1036F TOBACCO NON-USER: CPT | Performed by: INTERNAL MEDICINE

## 2024-02-13 PROCEDURE — 1157F ADVNC CARE PLAN IN RCRD: CPT | Performed by: INTERNAL MEDICINE

## 2024-02-13 PROCEDURE — 99495 TRANSJ CARE MGMT MOD F2F 14D: CPT | Performed by: INTERNAL MEDICINE

## 2024-02-13 RX ORDER — GABAPENTIN 100 MG/1
100 CAPSULE ORAL NIGHTLY
Qty: 90 CAPSULE | Refills: 0 | Status: SHIPPED | OUTPATIENT
Start: 2024-02-13 | End: 2024-02-26 | Stop reason: SDUPTHER

## 2024-02-13 RX ORDER — ATORVASTATIN CALCIUM 80 MG/1
80 TABLET, FILM COATED ORAL DAILY
Qty: 30 TABLET | Refills: 2 | Status: SHIPPED | OUTPATIENT
Start: 2024-02-13 | End: 2024-02-26 | Stop reason: SDUPTHER

## 2024-02-13 RX ORDER — ISOSORBIDE MONONITRATE 30 MG/1
60 TABLET, EXTENDED RELEASE ORAL DAILY
Qty: 30 TABLET | Refills: 0 | Status: SHIPPED | OUTPATIENT
Start: 2024-02-13 | End: 2024-02-26 | Stop reason: SDUPTHER

## 2024-02-13 RX ORDER — CARVEDILOL 6.25 MG/1
6.25 TABLET ORAL 2 TIMES DAILY
Qty: 30 TABLET | Refills: 0 | Status: SHIPPED | OUTPATIENT
Start: 2024-02-13 | End: 2024-02-26 | Stop reason: SDUPTHER

## 2024-02-13 RX ORDER — GUAIFENESIN 600 MG/1
600 TABLET, EXTENDED RELEASE ORAL EVERY 12 HOURS PRN
Qty: 14 TABLET | Refills: 0 | Status: SHIPPED | OUTPATIENT
Start: 2024-02-13 | End: 2024-02-26 | Stop reason: ALTCHOICE

## 2024-02-13 NOTE — PROGRESS NOTES
Alyce Dougherty is a 83 y.o. female   Patient with a past medical history of HTN,  HLD, ESRD on hemodialysis (T, Th, Sat), HFrEF,  PAD, GERD,  Anemia of Chronic Disease, history of Breast CA status postlumpectomy s/p displaced fractures of left superior and inferior pubic rami after a fall, Pseudogout, Osteoarthritis    Admission to February 4, 2024  Discharge February 5, 2024  Admitting diagnosis was weakness and lethargy  Discharge diagnosis pneumonia treated with IV Zosyn and oral Augmentin    Doing better  Cough is dry , still persists  No SOB               Review of Systems     Constitutional: no fever, no chills, not feeling poorly, not feeling tired and no recent weight gain, no recent weight loss.   ENT: no earache, no hearing loss, no nosebleeds, no nasal discharge, no sore throat and no hoarseness.   Cardiovascular: the heart rate was not slow, the heart rate was not fast, no chest pain, no palpitations, no intermittent leg claudication and no lower extremity edema.   Respiratory: no cough, wheezing or shortness of breath at rest or exertion  Gastrointestinal: no abdominal pain, no constipation, no melena, no nausea, no diarrhea, no vomiting and no blood in stools.   Musculoskeletal: no arthralgias, no myalgias, no back pain, no joint swelling, no joint stiffness, no limb pain and no limb swelling.   Integumentary: no skin rashes, no skin lesions, no itching, no skin wound and no dry skin.   Neurological: no headache, no confusion, no numbness, no dizziness, no tingling and no fainting.   All other systems have been reviewed and are negative for complaint.       There were no vitals filed for this visit.       Physical Exam     Constitutional   General appearance: Alert and in no acute distress.     Pulmonary   Respiratory assessment: No respiratory distress, normal respiratory rhythm and effort.    Auscultation of Lungs: Clear bilateral breath sounds.   Cardiovascular   Auscultation of heart: Apical  pulse normal, heart rate and rhythm normal, normal S1 and S2, no murmurs and no pericardial rub.    Exam for edema: no edema  Abdomen   Abdominal Exam: No bruits, normal bowel sounds, soft, non-tender, no abdominal mass palpated.    Liver and Spleen exam: No hepato-splenomegaly.   Musculoskeletal   Examination of gait: Normal.    Inspection of digits and nails: No clubbing or cyanosis of the fingernails.    Inspection/palpation of joints, bones and muscles: No joint swelling. Normal movement of all extremities.   Skin   Skin inspection: Normal skin color and pigmentation, normal skin turgor and no visible rash.   Neurologic   Cranial nerves: Nerves 2-12 were intact, no focal neuro defects.     Assessment/Plan          Patient with a past medical history of HTN,  HLD, ESRD on hemodialysis (T, Th, Sat), HFrEF,  PAD, GERD,  Anemia of Chronic Disease, history of Breast CA status postlumpectomy s/p displaced fractures of left superior and inferior pubic rami after a fall, Pseudogout, Osteoarthritis    # PNA  Completed Treatment  Will repeat Chest xrauy    # COPD  Chest xray  Mucinex    # HTN  Stable  Off Amlodipine and lasix  Continue current medications    # HLD  Stable  Continue current medications    # ESRD  On HD    # HFeEF  # Pedal edema  Stable  Off of lasix now

## 2024-02-15 ENCOUNTER — HOSPITAL ENCOUNTER (OUTPATIENT)
Dept: RADIOLOGY | Facility: HOSPITAL | Age: 84
Discharge: HOME | End: 2024-02-15
Payer: COMMERCIAL

## 2024-02-15 DIAGNOSIS — J18.9 PNEUMONIA OF RIGHT LOWER LOBE DUE TO INFECTIOUS ORGANISM: ICD-10-CM

## 2024-02-15 PROCEDURE — 71046 X-RAY EXAM CHEST 2 VIEWS: CPT

## 2024-02-15 PROCEDURE — 71046 X-RAY EXAM CHEST 2 VIEWS: CPT | Performed by: RADIOLOGY

## 2024-02-15 NOTE — DOCUMENTATION CLARIFICATION NOTE
PATIENT:               SOLA MORLEY  ACCT #:                  [id number]  MRN:                       [id number]  :                       1940  ADMIT DATE:       2/3/2024 5:01 PM  DISCH DATE:        2024 4:38 PM  RESPONDING PROVIDER #:        [id number]          PROVIDER RESPONSE TEXT:    Aspiration PNA    CDI QUERY TEXT:    [facility]_Pneumonia Specificity    Instruction:    Based on your assessment of the patient and the clinical information, please provide the requested documentation by clicking on the appropriate radio button and enter any additional information if prompted.    Question: Please further specify the type of pneumonia being treated      When answering this query, please exercise your independent professional judgment. The fact that a question is being asked, does not imply that any particular answer is desired or expected.    The patient's clinical indicators include:  Clinical Information:  82 y/o [place] [facility] [place] after [place]. DX Pneumonia, hypotension.      Clinical Indicators:  ED note per DANAY Conteh MD : ?Pneumonia?  HP note per RAMÓN Corado MD : ?Right lower lobe pneumonia Start IV Zosyn?    Vitals: ED: T 36.4, HR 74, RR 16, 109/52  Labs  - :  WBC 10.1 - 9.2  Neutrophil Absolute 7.57  Lactate 1.0    CXR : ?IMPRESSION: Right lower lobe infiltrate on a background of interstitial change.?      Treatment: IV Azithromycin x1 , IV Vancomycin x1 , IV Zosyn  -   Risk Factors: PMH: HTN, ESRD on HD, CHF Combined Diastolic/Systolic, Breast CA  Options provided:  -- Aspiration PNA  -- Gram Negative PNA  -- Simple Bacterial PNA, no further specificity  -- Other - I will add my own diagnosis  -- Refer to Clinical Documentation Reviewer    Query created by: Ellyn Perez on 2024 2:29 PM      Electronically signed by:  STEPHANIE CORADO MD 2/15/2024 6:08 PM

## 2024-02-16 ENCOUNTER — APPOINTMENT (OUTPATIENT)
Dept: DIALYSIS | Facility: HOSPITAL | Age: 84
End: 2024-02-16
Payer: COMMERCIAL

## 2024-02-16 ENCOUNTER — PATIENT OUTREACH (OUTPATIENT)
Dept: CARE COORDINATION | Facility: CLINIC | Age: 84
End: 2024-02-16
Payer: COMMERCIAL

## 2024-02-16 ENCOUNTER — APPOINTMENT (OUTPATIENT)
Dept: CARDIOLOGY | Facility: HOSPITAL | Age: 84
DRG: 377 | End: 2024-02-16
Payer: MEDICARE

## 2024-02-16 ENCOUNTER — HOSPITAL ENCOUNTER (INPATIENT)
Facility: HOSPITAL | Age: 84
LOS: 1 days | Discharge: HOME | DRG: 377 | End: 2024-02-17
Attending: EMERGENCY MEDICINE | Admitting: INTERNAL MEDICINE
Payer: MEDICARE

## 2024-02-16 DIAGNOSIS — K92.2 GI BLEED: Primary | ICD-10-CM

## 2024-02-16 LAB
ABO GROUP (TYPE) IN BLOOD: NORMAL
ALBUMIN SERPL BCP-MCNC: 2.9 G/DL (ref 3.4–5)
ALP SERPL-CCNC: 147 U/L (ref 33–136)
ALT SERPL W P-5'-P-CCNC: 11 U/L (ref 7–45)
ANION GAP SERPL CALC-SCNC: 20 MMOL/L (ref 10–20)
ANTIBODY SCREEN: NORMAL
APTT PPP: 36 SECONDS (ref 27–38)
AST SERPL W P-5'-P-CCNC: 15 U/L (ref 9–39)
BASOPHILS # BLD AUTO: 0.07 X10*3/UL (ref 0–0.1)
BASOPHILS NFR BLD AUTO: 0.7 %
BILIRUB SERPL-MCNC: 0.3 MG/DL (ref 0–1.2)
BLOOD EXPIRATION DATE: NORMAL
BLOOD EXPIRATION DATE: NORMAL
BUN SERPL-MCNC: 65 MG/DL (ref 6–23)
CALCIUM SERPL-MCNC: 7.5 MG/DL (ref 8.6–10.3)
CHLORIDE SERPL-SCNC: 91 MMOL/L (ref 98–107)
CO2 SERPL-SCNC: 26 MMOL/L (ref 21–32)
CREAT SERPL-MCNC: 6.34 MG/DL (ref 0.5–1.05)
DISPENSE STATUS: NORMAL
DISPENSE STATUS: NORMAL
EGFRCR SERPLBLD CKD-EPI 2021: 6 ML/MIN/1.73M*2
EOSINOPHIL # BLD AUTO: 0.42 X10*3/UL (ref 0–0.4)
EOSINOPHIL NFR BLD AUTO: 4.5 %
ERYTHROCYTE [DISTWIDTH] IN BLOOD BY AUTOMATED COUNT: 16.1 % (ref 11.5–14.5)
GLUCOSE SERPL-MCNC: 89 MG/DL (ref 74–99)
HCT VFR BLD AUTO: 20.1 % (ref 36–46)
HGB BLD-MCNC: 6.3 G/DL (ref 12–16)
IMM GRANULOCYTES # BLD AUTO: 0.03 X10*3/UL (ref 0–0.5)
IMM GRANULOCYTES NFR BLD AUTO: 0.3 % (ref 0–0.9)
INR PPP: 1 (ref 0.9–1.1)
LYMPHOCYTES # BLD AUTO: 2.11 X10*3/UL (ref 0.8–3)
LYMPHOCYTES NFR BLD AUTO: 22.4 %
MCH RBC QN AUTO: 30.1 PG (ref 26–34)
MCHC RBC AUTO-ENTMCNC: 31.3 G/DL (ref 32–36)
MCV RBC AUTO: 96 FL (ref 80–100)
MONOCYTES # BLD AUTO: 0.58 X10*3/UL (ref 0.05–0.8)
MONOCYTES NFR BLD AUTO: 6.2 %
NEUTROPHILS # BLD AUTO: 6.21 X10*3/UL (ref 1.6–5.5)
NEUTROPHILS NFR BLD AUTO: 65.9 %
NRBC BLD-RTO: 0 /100 WBCS (ref 0–0)
PLATELET # BLD AUTO: 182 X10*3/UL (ref 150–450)
POTASSIUM SERPL-SCNC: 6.9 MMOL/L (ref 3.5–5.3)
PRODUCT BLOOD TYPE: 5100
PRODUCT BLOOD TYPE: 5100
PRODUCT CODE: NORMAL
PRODUCT CODE: NORMAL
PROT SERPL-MCNC: 6 G/DL (ref 6.4–8.2)
PROTHROMBIN TIME: 11.6 SECONDS (ref 9.8–12.8)
RBC # BLD AUTO: 2.09 X10*6/UL (ref 4–5.2)
RH FACTOR (ANTIGEN D): NORMAL
SODIUM SERPL-SCNC: 130 MMOL/L (ref 136–145)
UNIT ABO: NORMAL
UNIT ABO: NORMAL
UNIT NUMBER: NORMAL
UNIT NUMBER: NORMAL
UNIT RH: NORMAL
UNIT RH: NORMAL
UNIT VOLUME: 350
UNIT VOLUME: 350
WBC # BLD AUTO: 9.4 X10*3/UL (ref 4.4–11.3)
XM INTEP: NORMAL
XM INTEP: NORMAL

## 2024-02-16 PROCEDURE — 36415 COLL VENOUS BLD VENIPUNCTURE: CPT | Performed by: EMERGENCY MEDICINE

## 2024-02-16 PROCEDURE — 86901 BLOOD TYPING SEROLOGIC RH(D): CPT | Performed by: EMERGENCY MEDICINE

## 2024-02-16 PROCEDURE — P9016 RBC LEUKOCYTES REDUCED: HCPCS

## 2024-02-16 PROCEDURE — 94644 CONT INHLJ TX 1ST HOUR: CPT

## 2024-02-16 PROCEDURE — 2500000004 HC RX 250 GENERAL PHARMACY W/ HCPCS (ALT 636 FOR OP/ED): Performed by: EMERGENCY MEDICINE

## 2024-02-16 PROCEDURE — 99222 1ST HOSP IP/OBS MODERATE 55: CPT | Performed by: INTERNAL MEDICINE

## 2024-02-16 PROCEDURE — 86920 COMPATIBILITY TEST SPIN: CPT

## 2024-02-16 PROCEDURE — 80053 COMPREHEN METABOLIC PANEL: CPT | Performed by: EMERGENCY MEDICINE

## 2024-02-16 PROCEDURE — 1100000001 HC PRIVATE ROOM DAILY

## 2024-02-16 PROCEDURE — 99285 EMERGENCY DEPT VISIT HI MDM: CPT | Mod: 25 | Performed by: EMERGENCY MEDICINE

## 2024-02-16 PROCEDURE — 85730 THROMBOPLASTIN TIME PARTIAL: CPT | Performed by: EMERGENCY MEDICINE

## 2024-02-16 PROCEDURE — 2500000002 HC RX 250 W HCPCS SELF ADMINISTERED DRUGS (ALT 637 FOR MEDICARE OP, ALT 636 FOR OP/ED): Performed by: EMERGENCY MEDICINE

## 2024-02-16 PROCEDURE — 93005 ELECTROCARDIOGRAM TRACING: CPT

## 2024-02-16 PROCEDURE — 85610 PROTHROMBIN TIME: CPT | Performed by: EMERGENCY MEDICINE

## 2024-02-16 PROCEDURE — 2500000005 HC RX 250 GENERAL PHARMACY W/O HCPCS: Performed by: EMERGENCY MEDICINE

## 2024-02-16 PROCEDURE — 85025 COMPLETE CBC W/AUTO DIFF WBC: CPT | Performed by: EMERGENCY MEDICINE

## 2024-02-16 PROCEDURE — 2500000002 HC RX 250 W HCPCS SELF ADMINISTERED DRUGS (ALT 637 FOR MEDICARE OP, ALT 636 FOR OP/ED): Mod: MUE | Performed by: EMERGENCY MEDICINE

## 2024-02-16 PROCEDURE — 90937 HEMODIALYSIS REPEATED EVAL: CPT

## 2024-02-16 PROCEDURE — 36430 TRANSFUSION BLD/BLD COMPNT: CPT

## 2024-02-16 PROCEDURE — 2500000001 HC RX 250 WO HCPCS SELF ADMINISTERED DRUGS (ALT 637 FOR MEDICARE OP): Performed by: INTERNAL MEDICINE

## 2024-02-16 RX ORDER — LIDOCAINE AND PRILOCAINE 25; 25 MG/G; MG/G
CREAM TOPICAL
Status: COMPLETED | OUTPATIENT
Start: 2024-02-16 | End: 2024-02-16

## 2024-02-16 RX ORDER — DEXTROSE MONOHYDRATE 100 MG/ML
50 INJECTION, SOLUTION INTRAVENOUS CONTINUOUS
Status: ACTIVE | OUTPATIENT
Start: 2024-02-16 | End: 2024-02-16

## 2024-02-16 RX ORDER — ALBUTEROL SULFATE 0.83 MG/ML
20 SOLUTION RESPIRATORY (INHALATION) ONCE
Status: COMPLETED | OUTPATIENT
Start: 2024-02-16 | End: 2024-02-16

## 2024-02-16 RX ORDER — DEXTROSE 50 % IN WATER (D50W) INTRAVENOUS SYRINGE
25 ONCE
Status: COMPLETED | OUTPATIENT
Start: 2024-02-16 | End: 2024-02-16

## 2024-02-16 RX ADMIN — DEXTROSE MONOHYDRATE 25 G: 25 INJECTION, SOLUTION INTRAVENOUS at 15:46

## 2024-02-16 RX ADMIN — DEXTROSE MONOHYDRATE 50 ML/HR: 100 INJECTION, SOLUTION INTRAVENOUS at 15:53

## 2024-02-16 RX ADMIN — LIDOCAINE AND PRILOCAINE: 25; 25 CREAM TOPICAL at 17:28

## 2024-02-16 RX ADMIN — ALBUTEROL SULFATE 20 MG: 2.5 SOLUTION RESPIRATORY (INHALATION) at 15:08

## 2024-02-16 RX ADMIN — SODIUM ZIRCONIUM CYCLOSILICATE 10 G: 10 POWDER, FOR SUSPENSION ORAL at 15:47

## 2024-02-16 RX ADMIN — INSULIN HUMAN 10 UNITS: 100 INJECTION, SOLUTION PARENTERAL at 15:45

## 2024-02-16 ASSESSMENT — PAIN SCALES - GENERAL
PAINLEVEL_OUTOF10: 0 - NO PAIN

## 2024-02-16 ASSESSMENT — COLUMBIA-SUICIDE SEVERITY RATING SCALE - C-SSRS
2. HAVE YOU ACTUALLY HAD ANY THOUGHTS OF KILLING YOURSELF?: NO
6. HAVE YOU EVER DONE ANYTHING, STARTED TO DO ANYTHING, OR PREPARED TO DO ANYTHING TO END YOUR LIFE?: NO
1. IN THE PAST MONTH, HAVE YOU WISHED YOU WERE DEAD OR WISHED YOU COULD GO TO SLEEP AND NOT WAKE UP?: NO

## 2024-02-16 ASSESSMENT — PAIN - FUNCTIONAL ASSESSMENT
PAIN_FUNCTIONAL_ASSESSMENT: 0-10
PAIN_FUNCTIONAL_ASSESSMENT: 0-10
PAIN_FUNCTIONAL_ASSESSMENT: NO/DENIES PAIN

## 2024-02-16 ASSESSMENT — COGNITIVE AND FUNCTIONAL STATUS - GENERAL
WALKING IN HOSPITAL ROOM: A LITTLE
DAILY ACTIVITIY SCORE: 21
TOILETING: A LITTLE
MOBILITY SCORE: 21
CLIMB 3 TO 5 STEPS WITH RAILING: A LITTLE
DRESSING REGULAR LOWER BODY CLOTHING: A LITTLE
HELP NEEDED FOR BATHING: A LITTLE
STANDING UP FROM CHAIR USING ARMS: A LITTLE

## 2024-02-16 ASSESSMENT — ACTIVITIES OF DAILY LIVING (ADL): LACK_OF_TRANSPORTATION: NO

## 2024-02-16 NOTE — PROGRESS NOTES
Prior to Admission Medications   Prescriptions Last Dose Informant   amoxicillin-pot clavulanate (Augmentin) 250-125 mg tablet     Sig: Take 1 tablet (250 mg) by mouth 2 times a day for 5 days.   Patient taking differently: Take 1 tablet by mouth 2 times a day. Has 3 doses left   aspirin 81 mg EC tablet 2/15/2024 Self   Sig: Take 1 tablet (81 mg) by mouth once daily.   atorvastatin (Lipitor) 80 mg tablet 2/15/2024 Self   Sig: Take 1 tablet (80 mg) by mouth once daily.   carvedilol (Coreg) 6.25 mg tablet 2/15/2024 Self   Sig: Take 1 tablet (6.25 mg) by mouth 2 times a day. Please note decreased dose   gabapentin (Neurontin) 100 mg capsule 2/15/2024 Self   Sig: Take 1 capsule (100 mg) by mouth once daily at bedtime.   guaiFENesin (Mucinex) 600 mg 12 hr tablet 2/15/2024 Self   Sig: Take 1 tablet (600 mg) by mouth every 12 hours if needed for cough for up to 7 days. Do not crush, chew, or split.   isosorbide mononitrate ER (Imdur) 30 mg 24 hr tablet 2/15/2024 Self   Sig: Take 2 tablets (60 mg) by mouth once daily.   magnesium oxide (Mag-Ox) 400 mg (241.3 mg magnesium) tablet 2/15/2024 Self   Sig: Take 1 tablet (400 mg) by mouth once daily.   melatonin 3 mg tablet 2/15/2024 Self   Sig: Take 1 tablet (3 mg) by mouth as needed at bedtime for sleep.   mirtazapine (Remeron) 7.5 mg tablet 2/15/2024 Self   Sig: Take 1 tablet (7.5 mg) by mouth once daily at bedtime.   sevelamer (Renagel) 800 mg tablet 2/15/2024 Self   Sig: Take 1 tablet (800 mg) by mouth once daily. Before meals   tiotropium (Spiriva with HandiHaler) 18 mcg inhalation capsule 2/15/2024 Self   Sig: Place 1 capsule (18 mcg) into inhaler and inhale once daily at bedtime.      Facility-Administered Medications: None       Interviewed patient  Thelma Hyman CPhT

## 2024-02-16 NOTE — H&P
Alyce Dougherty is a 84 y.o. female   [facility]   Patient with a past medical history of end-stage renal disease on hemodialysis chronic combined systolic and diastolic congestive heart failure hypertension dyslipidemia peripheral artery disease was doing well until Tuesday when she noticed some black stool in her bowels  This was followed by multiple episodes of black stool over the last couple of days.  There was no associated abdominal pain cramping nausea vomiting denies any chest pain or shortness of breath or dizziness  Felt a little weak today so blood work was done which showed anemia, transfused 2 units of packed RBCs and admitted for further evaluation    Past Medical History  Past Medical History:   Diagnosis Date    Chronic combined systolic and diastolic congestive heart failure (CMS/HCC) 01/31/2024    -ECHO 8/1/2022:  The left ventricular systolic function is moderately to severely decreased, with an estimated ejection fraction of 30-35%. There is global hypokinesis of the left ventricle with minor regional variations. The left ventricular cavity size is normal. Spectral Doppler shows a pseudonormal pattern of left ventricular diastolic filling.     Chronic systolic (congestive) heart failure (CMS/[facility]) 06/26/2022    End stage renal disease (CMS/[facility]) 06/26/2022    Essential hypertension 03/14/2023    Hyperlipidemia 03/14/2023    Metabolic encephalopathy 07/20/2022    PAD (peripheral artery disease) (CMS/[facility]) 03/14/2023       Surgical History  Past Surgical History:   Procedure Laterality Date    CT ABDOMEN PELVIS ANGIOGRAM W AND/OR WO IV CONTRAST  3/19/2023    CT ABDOMEN PELVIS ANGIOGRAM W AND/OR WO IV CONTRAST [facility]    CT ABDOMEN PELVIS ANGIOGRAM W AND/OR WO IV CONTRAST  6/7/2023    CT ABDOMEN PELVIS ANGIOGRAM W AND/OR WO IV CONTRAST 6/7/2023 [facility] CT    OTHER SURGICAL HISTORY  10/03/2020    Arteriovenous graft fistula creation procedure    OTHER SURGICAL HISTORY  10/03/2020    Arteriovenous fistula creation  procedure    OTHER SURGICAL HISTORY  10/03/2020    Lumpectomy    OTHER SURGICAL HISTORY  10/03/2020    Cataract surgery    OTHER SURGICAL HISTORY  10/03/2020    Tubal ligation bilateral    OTHER SURGICAL HISTORY  10/03/2020    Lung biopsy    OTHER SURGICAL HISTORY  10/03/2020    Mastectomy partial    OTHER SURGICAL HISTORY  10/03/2020    Tonsillectomy        Social History  She reports that she has never smoked. She has never used smokeless tobacco. She reports that she does not currently use alcohol. She reports that she does not currently use drugs.    Family History  Family History   Problem Relation Name Age of Onset    Other (CVA) Mother      Coronary artery disease Mother      Lymphoma Father      Breast cancer Sister      Breast cancer Daughter          Allergies  Lisinopril, Hydralazine, Metoprolol, and Hydrochlorothiazide    Review of Systems     Constitutional: not feeling poorly, no fever, no recent weight gain and no recent weight loss.   Eyes: no blurred vision and no diplopia.   ENT: no hearing loss, no tinnitus, no earache, no sore throat, no hoarseness and no swollen glands in the neck.   Cardiovascular: no chest pain, no tightness or heavy pressure, no shortness of breath, no palpitations and no lower extremity edema.   Respiratory: no cough, wheezing or shortness of breath at rest or exertion  Gastrointestinal: no change in bowel habits, no diarrhea, no constipation,  no nausea, no vomiting, no abdominal pain, no signs and symptoms of ulcer disease, no kimberly colored stools and no intolerance to fatty foods.   Genitourinary: no urinary frequency, no dysuria, no hematuria, no burning sensation during urination, urinary stream is not smaller and urinary stream does not start and stop.   Musculoskeletal: no arthralgias, no joint stiffness, no muscle weakness, no back pain and no difficulty walking.   Skin: no rashes, no change in skin color and pigmentation, no skin lesions and no skin lumps.    Neurological: no headaches, no dizziness, no seizures, no tingling, no numbness, no signs and symptoms of stroke and no limb weakness.   Psychiatric: no confusion, no memory lapses or loss, no depression and no sleep disturbances.   Endocrine: no goiter, no thyroid disorder, no diabetes mellitus, no excessive thirst, no dry skin, no cold intolerance, no heat intolerance and no increased urinary frequency.   Hematologic/Lymphatic: is not slow to heal, does not bleed easily, does not bruise easily, no thrombophlebitis, no anemia and no history of blood transfusion.   All other systems have been reviewed and are negative for complaint.     Vitals:    02/16/24 1734   BP: 129/62   Pulse: 78   Resp: 19   Temp:    SpO2: 100%        Scheduled medications     Continuous medications  dextrose 10 % in water (D10W), 50 mL/hr, Last Rate: 50 mL/hr (02/16/24 1553)      PRN medications      Results from last 7 days   Lab Units 02/16/24  1248   WBC AUTO x10*3/uL 9.4   HEMOGLOBIN g/dL 6.3*   HEMATOCRIT % 20.1*   PLATELETS AUTO x10*3/uL 182     Results from last 7 days   Lab Units 02/16/24  1248   SODIUM mmol/L 130*   POTASSIUM mmol/L 6.9*   CHLORIDE mmol/L 91*   CO2 mmol/L 26   BUN mg/dL 65*   CREATININE mg/dL 6.34*   CALCIUM mg/dL 7.5*   PROTEIN TOTAL g/dL 6.0*   BILIRUBIN TOTAL mg/dL 0.3   ALK PHOS U/L 147*   ALT U/L 11   AST U/L 15   GLUCOSE mg/dL 89            No orders to display       Physical Exam     Constitutional   General appearance: Alert and in no acute distress.   Eyes   Inspection of eyes: Sclera and conjunctiva were normal.    Pupil exam: Pupils were equal in size. Extraocular movements were intact.   Pulmonary   Respiratory assessment: No respiratory distress, normal respiratory rhythm and effort.    Auscultation of Lungs: Clear bilateral breath sounds.   Cardiovascular   Auscultation of heart: Apical pulse normal, heart rate and rhythm normal, normal S1 and S2, no murmurs and no pericardial rub.    Exam for edema:  No peripheral edema.   Abdomen   Abdominal Exam: No bruits, normal bowel sounds, soft, non-tender, no abdominal mass palpated.    Liver and Spleen exam: No hepato-splenomegaly.   Musculoskeletal   Examination of gait: Normal.    Inspection of digits and nails: No clubbing or cyanosis of the fingernails.    Inspection/palpation of joints, bones and muscles: No joint swelling. Normal movement of all extremities.   Skin   Skin inspection: Normal skin color and pigmentation, normal skin turgor and no visible rash.   Neurologic   Cranial nerves: Nerves 2-12 were intact, no focal neuro defects.   Psychiatric   Orientation: Oriented to person, place, and time.    Mood and affect: Normal.       Assessment/Plan    #Blood loss anemia  #Melena  Transfused packed RBCs in the ER  Start PPI  Consult gastroenterology for potential endoscopy    #End-stage renal disease  Currently on hemodialysis through nephrology    #Chronic systolic congestive heart failure  Euvolemic    #Hypertension  Stable  Continue home medications  #Dyslipidemia  Stable continue home medications

## 2024-02-16 NOTE — PROGRESS NOTES
Transitional Care Coordination Progress Note:  Plan per Medical/Surgical team: treatment of Gi bleed, black stools  Status: ED  Payor source: medicare A, MMO [employer] HMO  Discharge disposition: home with son Calin & daughter in law  Potential Barriers: , K 6.9, renal 65/6.34, H/H 6.3/20.1  Hemodialysis [facility] T/TH/SAT @ 0800  ADOD: 2/18/2024  FARTUN Paez RN, BSN Transitional Care Coordinator ED# [phone]      02/16/24 1336   Discharge Planning   Living Arrangements Children   Support Systems Children   Assistance Needed Hemodialysis [facility] T/TH/SAT @ 0800   Type of Residence Private residence   Number of Stairs to Enter Residence 4   Number of Stairs Within Residence 12   Home or Post Acute Services None   Patient expects to be discharged to: home with son Calin & daughter in law   Does the patient need discharge transport arranged? Yes   RoundTrip coordination needed? Yes   Has discharge transport been arranged? No   Financial Resource Strain   How hard is it for you to pay for the very basics like food, housing, medical care, and heating? Not hard   Housing Stability   In the last 12 months, was there a time when you were not able to pay the mortgage or rent on time? N   In the last 12 months, how many places have you lived? 2   In the last 12 months, was there a time when you did not have a steady place to sleep or slept in a shelter (including now)? N   Transportation Needs   In the past 12 months, has lack of transportation kept you from medical appointments or from getting medications? no   In the past 12 months, has lack of transportation kept you from meetings, work, or from getting things needed for daily living? No

## 2024-02-16 NOTE — PROGRESS NOTES
[facility]ist Internal Medicine   Progress Note              Subjective  Patient seen and examined.    Afebrile,On RA   In good mood        I/O's  No intake or output data in the 24 hours ending 23 1438      ALLERGIES:  Patient has no known allergies.     No data recorded  MAP (mmHg)  Av  Min: 100  Max: 104          Inpatient Medications  Current Facility-Administered Medications   Medication Dose Route Frequency Provider Last Rate Last Admin   • haloperidol (HALDOL) tablet 5 mg  5 mg Oral Daily Michael Mathews MD   5 mg at 23 0854   • cloZAPine (CLOZARIL) tablet 150 mg  150 mg Oral QHS Michael Mathews MD   150 mg at 23   • cetirizine (ZyrTEC) tablet 10 mg  10 mg Oral Daily Christian Wilson DO   10 mg at 23 0854   • Potassium Standard Replacement Protocol (Levels 3.5 and lower)   Does not apply See Admin Instructions Christian Wilson DO       • Potassium Replacement (Levels 3.6 - 4)   Does not apply See Admin Instructions Christian Wilson DO       • Magnesium Standard Replacement Protocol   Does not apply See Admin Instructions Christian Wilson DO       • Phosphorus Standard Replacement Protocol   Does not apply See Admin Instructions Christian Wilson DO       • enoxaparin (LOVENOX) injection 40 mg  40 mg Subcutaneous Q24H Christian Wilson DO   40 mg at 23       Current Facility-Administered Medications   Medication Dose Route Frequency Provider Last Rate Last Admin       Current Facility-Administered Medications   Medication Dose Route Frequency Provider Last Rate Last Admin   • hydrOXYzine (ATARAX) tablet 25 mg  25 mg Oral TID PRN Christian Wilson DO       • LORazepam (ATIVAN) injection 2 mg  2 mg Intramuscular Q4H PRN Christian Wilson DO        Or   • LORazepam (ATIVAN) tablet 2 mg  2 mg Oral Q4H PRN Christian Wilson DO       • haloperidol lactate (HALDOL) 5 MG/ML short-acting injection 5 mg  5 mg Intramuscular Q6H PRN Christian Wilson DO        Or   • haloperidol (HALDOL) tablet 5 mg  5 mg Oral Q6H PRN Christian Wilson DO       •  home with son Calin & daughter in law     02/16/24 6337   Current Planned Discharge Disposition   Current Planned Discharge Disposition Home        acetaminophen (TYLENOL) tablet 650 mg  650 mg Oral Q4H PRN Christian [facility], DO        Or   • acetaminophen (TYLENOL) tablet 500 mg  500 mg Oral Q4H PRN Christian [facility], DO        Or   • acetaminophen (TYLENOL) tablet 650 mg  650 mg Oral Q4H PRN [facility], DO        Or   • acetaminophen (TYLENOL) tablet 1,000 mg  1,000 mg Oral Q6H PRN [facility], DO       • magnesium hydroxide (MILK OF MAGNESIA) 400 MG/5ML suspension 30 mL  30 mL Oral Daily PRN [facility], DO       • aluminum-magnesium hydroxide-simethicone (MAALOX) 200-200-20 MG/5ML suspension 30 mL  30 mL Oral Q4H PRN [facility], DO   30 mL at 01/16/23 2106   • diphenhydrAMINE (BENADRYL) capsule 25 mg  25 mg Oral Q4H PRN Gerardo Batista MD       • guaiFENesin (MUCINEX) ER tablet 1,200 mg  1,200 mg Oral BID PRN [facility], DO       • sodium chloride 0.9 % flush bag 25 mL  25 mL Intravenous PRN [facility], DO       • polyethylene glycol (MIRALAX) packet 17 g  17 g Oral Daily PRN [facility], DO       • docusate sodium-sennosides (SENOKOT S) 50-8.6 MG 2 tablet  2 tablet Oral Daily PRN [facility], DO       • albuterol inhaler 2 puff  2 puff Inhalation Q6H Resp PRN [facility], DO            Objective:  Objective   Last Recorded Vitals    SpO2 Readings from Last 3 Encounters:   01/17/23 99%   01/11/23 99%   01/05/23 98%        VITAL SIGNS:         Vital Last Value 24 Hour Range   Temperature 98.1 °F (36.7 °C) (01/17/23 0721) Temp  Min: 98.1 °F (36.7 °C)  Max: 98.4 °F (36.9 °C)   Pulse 86 (01/17/23 0721) Pulse  Min: 86  Max: 96   Respiratory 16 (01/14/23 2033) No data recorded   Non-Invasive  Blood Pressure (!) 123/94 (01/17/23 0721) BP  Min: 123/94  Max: 125/88   Pulse Oximetry 99 % (01/17/23 0721) SpO2  Min: 96 %  Max: 99 %   Arterial   Blood Pressure   No data recorded      Vital Today Admitted   Weight 81.6 kg (179 lb 14.3 oz) (01/11/23 2320) Weight: 81.6 kg (179 lb 14.3 oz) (01/11/23 2320)   Height N/A Height: 5' 7\" (170.2 cm) (01/11/23 2320)   BMI N/A BMI (Calculated): 28.18 (01/11/23  9700)            Physical Exam:  General: Alert,no acute distress  Eyes: no scleral icterus, no conjunctival erythema   Cardio: S1, S2, heard  Pulm: Lungs clear to auscultation bilaterally, no wheeze or rhonchi noted. No chest wall tenderness  GI: Soft, non-tender, nondistended.  : No suprapubic Tenderness, no CVA tenderness bilaterally  Ext: No upper or lower extremity edema noted. No cords palpated.   Musculoskeletal: 5/5 strength both upper and lower extremities. No joint tenderness or erythema.  Skin: No abnormal bruising or discoloration noted. No jaundice.   Psych: Debies any HI/SI or thoughts  Neuro: No focal motor or sensory deficits noted.     Labs       Recent Labs     01/15/23  0555 01/16/23  0554   WBC 5.9  --    RBC 4.64  --    HGB 13.8 13.4   HCT 42.3 39.9     --    MCV 91.2  --    MCH 29.7  --    MCHC 32.6  --    NRBCRE 0  --          Recent Labs     01/15/23  0555 01/16/23  0554   SODIUM 138 138   POTASSIUM 4.2 4.1   CO2 28 31   ANIONGAP 8 5*   GLUCOSE 95 96   BUN 16 15   CREATININE 0.93 0.89   BCRAT 17 17   CALCIUM 9.4 9.2        No results for input(s): ALKPT, BILIRUBIN, DBIL, ALBUMIN, GPT, AST, GLOB, AGR, LIPASE, CPK in the last 72 hours.    Invalid input(s): PROT    No results for input(s): PCT in the last 72 hours.     No results for input(s): RAPDTR, BNP, NTPROB in the last 72 hours.     No results for input(s): INR, PT, PTT in the last 72 hours.    No results available in last 24 hours      No results found for: TYSTIMHOR, FREET3, FREET4, KSTPZRP91HRT, KRISS, PTH    Lab Results   Component Value Date    HGBA1C 5.5 01/06/2023         No results found    Invalid input(s): VALERIA, SPECGRAVU, PROTEINU, BLOODU, NITRITEU, WBCHPF, RBCHPF, BACTERIAU      No results found for: TROPONINI    No results for input(s): CHOLESTEROL, HDLCHOLESTE in the last 72 hours.    Invalid input(s): TRIGLYCRIDES, LDL      Inflammatory  markers :     No results for input(s): LDH, FERR, DDIMER, CRP in the last 72  hours.      Imaging  No orders to display       Cultures  Microbiology Results     None       ]      Micro:    Assessment/Plan  Rivas Perdomo is a 33 year old male patient admitted with COVID-19 virus infection [U07.1].     COVID 19 infection, asymptomatic  -  asymptomatic   - on Ra, Afebrile- symptomatic managment  -monitor     Schoziphrenia with mood disorder:  - management per psych unit  - cont 1:1 sitter  - cont clozapine  - per Dr. Mathews plan to discharge back to psych once out of isolation period          DVT prophylaxis  Current Active Medications for DVT Prophylaxis (From admission, onward)         Stop     enoxaparin (LOVENOX) injection 40 mg  40 mg,   Subcutaneous,   EVERY 24 HOURS         --                 Diet:   Dietary Orders (From admission, onward)     Start     Ordered    01/14/23 1442  Nutrition Communication  CONTINUOUS        Question:  Nutrition communication (specify)  Answer:  2 baked chips and ifeoma mist with all trays until discharge thank you!    01/14/23 1443    01/14/23 0726  Nutrition Communication  CONTINUOUS        Question:  Nutrition communication (specify)  Answer:  please bring sausage pizza for lunch thank you    01/14/23 0726    01/13/23 0921  Nutrition Communication  ONE TIME        Question:  Nutrition communication (specify)  Answer:  please send sausage pizza with lunch tray    01/13/23 0920    01/11/23 1849  Regular Diet  DIET EFFECTIVE NOW        Question:  Diet Modifiers  Answer:  Regular    01/11/23 1848    01/11/23 1849  Safety Tray No Knives Deliver per Site Process -  Note  (Suicide Panel)  CONTINUOUS        Question:   Note  Answer:   Note  Comment:  SAFETY TRAY NO KNIVES DELIVER PER SITE PROCESS    01/11/23 1848    01/11/23 1849  Nutrition Communication  ONE TIME        Question:  Nutrition communication (specify)  Answer:  patient still hungry. please send grilled cheese x2. chocolate ice cream. potato chips.    01/11/23 1848                    CODE STATUS:     Code Status: Full Resuscitation    Communication: with patient, nurse     MORE than 50 MINS WERE SPENT ON THIS PATIENTS CARE TODAY. This includes the following: Reviewed all vitals, medications, new orders, I/O, labs, micro, radiology, nurses notes, pertinent consultant notes which are reflected in assessment and plan.This does not include time spent on other items of care such as smoking cessation counseling, prolonged care time, and or advanced care planning if applicable.     Primary Care Physician  No Pcp      Leanne Millan MD  [facility]  1/17/2023 2:38 PM        This note may have been transcribed with voice recognition software/M*Modal Fluency dictation system in part or in whole.  There may be voice recognition errors which should not be taken to alter content or meaning.

## 2024-02-16 NOTE — PROGRESS NOTES
Report from Sending RN:    Report From: Beth Prajapati RN  Recent Surgery of Procedure: No  Baseline Level of Consciousness (LOC): A&Ox4  Oxygen Use: No  Type: N/A  Diabetic: No  Last BP Med Given Day of Dialysis: See EMR  Last Pain Med Given: See EMR  Lab Tests to be Obtained with Dialysis: No  Blood Transfusion to be Given During Dialysis: No  Available IV Access: Yes  Medications to be Administered During Dialysis: No  Continuous IV Infusion Running: Yes  Restraints on Currently or in the Last 24 Hours: No  Hand-Off Communication: Pt here for a GI bleed, Received a blood transfusion. -Felipe OCDT

## 2024-02-16 NOTE — ED PROVIDER NOTES
HPI   Chief Complaint   Patient presents with    Black or Bloody Stool       Chief complaint: Bloody stool    History of present illness: Patient is a 84-year-old female with history of end-stage renal disease dialysis dependent peripheral artery disease COPD hypothyroidism pulmonary edema CHF cardiomyopathy and pseudogout presenting to the emergency department with complaints of rectal bleeding.  According to the patient, she started experiencing rectal bleeding over the past 2 days.  The patient's and family state that she has had tarry stools as well as dark red blood in her stool for the past 2 days.  The patient states that she does not take any blood thinners to her knowledge.  Patient was supposed to have dialysis today however she skipped that to present to the emergency department for further evaluation.  The patient states that she feels somewhat unwell.  The patient denies any dizziness or lightheadedness.  Concern, the patient presents to the emergency department for further evaluation she states that she has had symptoms like this in the past.        History provided by:  Patient   used: No                        No data recorded                   Patient History   Past Medical History:   Diagnosis Date    Chronic combined systolic and diastolic congestive heart failure (CMS/[facility]) 01/31/2024    -ECHO 8/1/2022:  The left ventricular systolic function is moderately to severely decreased, with an estimated ejection fraction of 30-35%. There is global hypokinesis of the left ventricle with minor regional variations. The left ventricular cavity size is normal. Spectral Doppler shows a pseudonormal pattern of left ventricular diastolic filling.     Chronic systolic (congestive) heart failure (CMS/[facility]) 06/26/2022    End stage renal disease (CMS/[facility]) 06/26/2022    Essential hypertension 03/14/2023    Hyperlipidemia 03/14/2023    Metabolic encephalopathy 07/20/2022    PAD (peripheral artery  disease) (CMS/HCC) 03/14/2023     Past Surgical History:   Procedure Laterality Date    CT ABDOMEN PELVIS ANGIOGRAM W AND/OR WO IV CONTRAST  3/19/2023    CT ABDOMEN PELVIS ANGIOGRAM W AND/OR WO IV CONTRAST [facility]    CT ABDOMEN PELVIS ANGIOGRAM W AND/OR WO IV CONTRAST  6/7/2023    CT ABDOMEN PELVIS ANGIOGRAM W AND/OR WO IV CONTRAST 6/7/2023 [facility] CT    OTHER SURGICAL HISTORY  10/03/2020    Arteriovenous graft fistula creation procedure    OTHER SURGICAL HISTORY  10/03/2020    Arteriovenous fistula creation procedure    OTHER SURGICAL HISTORY  10/03/2020    Lumpectomy    OTHER SURGICAL HISTORY  10/03/2020    Cataract surgery    OTHER SURGICAL HISTORY  10/03/2020    Tubal ligation bilateral    OTHER SURGICAL HISTORY  10/03/2020    Lung biopsy    OTHER SURGICAL HISTORY  10/03/2020    Mastectomy partial    OTHER SURGICAL HISTORY  10/03/2020    Tonsillectomy     Family History   Problem Relation Name Age of Onset    Other (CVA) Mother      Coronary artery disease Mother      Lymphoma Father      Breast cancer Sister      Breast cancer Daughter       Social History     Tobacco Use    Smoking status: Never    Smokeless tobacco: Never   Vaping Use    Vaping Use: Never used   Substance Use Topics    Alcohol use: Not Currently    Drug use: Not Currently       Physical Exam   ED Triage Vitals [02/16/24 1107]   Temperature Heart Rate Respirations BP   37.1 °C (98.7 °F) 82 17 (!) 117/47      Pulse Ox Temp src Heart Rate Source Patient Position   98 % -- -- --      BP Location FiO2 (%)     -- --       Physical Exam  Constitutional:       Appearance: Normal appearance.   HENT:      Head: Normocephalic and atraumatic.      Right Ear: External ear normal.      Left Ear: External ear normal.      Nose: Nose normal.      Mouth/Throat:      Mouth: Mucous membranes are moist.   Eyes:      General: Lids are normal.      Extraocular Movements: Extraocular movements intact.      Pupils: Pupils are equal, round, and reactive to light.    Cardiovascular:      Rate and Rhythm: Normal rate and regular rhythm.      Heart sounds: Normal heart sounds.   Pulmonary:      Effort: Pulmonary effort is normal.      Breath sounds: Normal breath sounds.   Abdominal:      General: Abdomen is flat.      Palpations: Abdomen is soft.      Tenderness: There is no abdominal tenderness. There is no guarding or rebound.   Musculoskeletal:         General: No deformity. Normal range of motion.      Cervical back: Normal range of motion and neck supple.   Skin:     General: Skin is warm.      Capillary Refill: Capillary refill takes less than 2 seconds.      Coloration: Skin is pale. Skin is not jaundiced.   Neurological:      General: No focal deficit present.      Mental Status: She is alert and oriented to person, place, and time.   Psychiatric:         Mood and Affect: Mood normal.         Behavior: Behavior normal.         ED Course & MDM   Diagnoses as of 02/20/24 2107   GI bleed       Medical Decision Making  Medical decision making: Patient remained stable throughout her time in the emergency department.  CBC demonstrated a hemoglobin of only 6.3 with no other significant abnormalities Chem-7 demonstrated a potassium of 6.9 while creatinine was 6.34 BUN of 65 and the patient's LFTs were essentially within normal limits INR was within normal limits while the patient's blood type is a positive.  The patient's EKG demonstrated a normal sinus rhythm with a rate of 76 bpm isoelectric ST segments narrow QRS complexes and a QTc of 483.    Patient presents to the emergency department with complaints of rectal bleeding.  Workup was performed above and verified the patient was significantly anemic.  The patient was consulted and consented for 2 units of packed red blood cells.  Given the patient's hyperkalemia, I gave the patient and the hyperkalemia protocol consisting of dextrose insulin albuterol inhaled and low-key Lokelma p.o.  I spoke with the hospitalist regarding  this patient's case they agreed the patient to be admitted to their service for further evaluation and therapy.  The patient was then admitted to the hospital in otherwise stable condition.    Amount and/or Complexity of Data Reviewed  Labs: ordered. Decision-making details documented in ED Course.  ECG/medicine tests: ordered and independent interpretation performed. Decision-making details documented in ED Course.        Procedure  Procedures     Vikash Freeman MD  02/20/24 8769

## 2024-02-16 NOTE — PROGRESS NOTES
Unable to reach patient for call back after patient's follow up appointment with PCP.   RENETTAM with call back number for patient to call if needed   If no voicemail available call attempts x 2 were made to contact the patient to assist with any questions or concerns patient may have.

## 2024-02-16 NOTE — PROGRESS NOTES
02/16/24 1336   [facility] Disability Status   Are you deaf or do you have serious difficulty hearing? N   Are you blind or do you have serious difficulty seeing, even when wearing glasses? N   Because of a physical, mental, or emotional condition, do you have serious difficulty concentrating, remembering, or making decisions? (5 years old or older) Y   Do you have serious difficulty walking or climbing stairs? Y   Do you have serious difficulty dressing or bathing? Y   Because of a physical, mental, or emotional condition, do you have serious difficulty doing errands alone such as visiting the doctor? Y

## 2024-02-16 NOTE — CONSULTS
Reason For Consult  ESKD F and hyperkalemia    History Of Present Illness  Alyce Dougherty is a 84 y.o. female presenting with generalized weakness, patient hemodialysis at [facility] will schedule of Monday/Wednesday/Friday.  She missed her dialysis session , son brought her to the hospital instead.  Patient was complaining of black tarry stools of several days duration, she has history of hypertension, hyperlipidemia, chronic congestive heart failure.  Past Medical History  She has a past medical history of Chronic combined systolic and diastolic congestive heart failure (CMS/HCC) (01/31/2024), Chronic systolic (congestive) heart failure (CMS/HCC) (06/26/2022), End stage renal disease (CMS/[facility]) (06/26/2022), Essential hypertension (03/14/2023), Hyperlipidemia (03/14/2023), Metabolic encephalopathy (07/20/2022), and PAD (peripheral artery disease) (CMS/[facility]) (03/14/2023).    Surgical History  She has a past surgical history that includes Other surgical history (10/03/2020); Other surgical history (10/03/2020); Other surgical history (10/03/2020); Other surgical history (10/03/2020); Other surgical history (10/03/2020); Other surgical history (10/03/2020); Other surgical history (10/03/2020); Other surgical history (10/03/2020); CT angio abdomen pelvis w and or wo IV IV contrast (3/19/2023); and CT angio abdomen pelvis w and or wo IV IV contrast (6/7/2023).     Social History  She reports that she has never smoked. She has never used smokeless tobacco. She reports that she does not currently use alcohol. She reports that she does not currently use drugs.    Family History  Family History   Problem Relation Name Age of Onset    Other (CVA) Mother      Coronary artery disease Mother      Lymphoma Father      Breast cancer Sister      Breast cancer Daughter          Allergies  Lisinopril, Hydralazine, Metoprolol, and Hydrochlorothiazide    Review of Systems  All systems were reviewed     Physical Exam  GEN appearance:  "Awake and alert no acute distress  Head and ENT: Normocephalic/atraumatic/supple neck/no JVD  Lungs: Clear to auscultation  Heart: RRR  Extremities: Positive bruit and thrill over left upper arm AV fistula, no peripheral edema  Neurologic; physiologic         I&O 24HR  No intake or output data in the 24 hours ending 02/16/24 1512    Vitals 24HR  Heart Rate:  [76-82]   Temperature:  [36.8 °C (98.2 °F)-37.1 °C (98.7 °F)]   Respirations:  [16-18]   BP: (109-117)/(47-61)   Height:  [167.6 cm (5' 6\")]   Weight:  [55.3 kg (122 lb)]   Pulse Ox:  [97 %-100 %]         Relevant Results  Results from last 7 days   Lab Units 02/16/24  1248   SODIUM mmol/L 130*   POTASSIUM mmol/L 6.9*   CHLORIDE mmol/L 91*   CO2 mmol/L 26   BUN mg/dL 65*   CREATININE mg/dL 6.34*   GLUCOSE mg/dL 89   CALCIUM mg/dL 7.5*      Results from last 7 days   Lab Units 02/16/24  1248   WBC AUTO x10*3/uL 9.4   HEMOGLOBIN g/dL 6.3*   HEMATOCRIT % 20.1*   PLATELETS AUTO x10*3/uL 182    ECG 12 lead    Result Date: 2/16/2024  Sinus rhythm with 1st degree AV block ST & T wave abnormality, consider inferolateral ischemia Prolonged QT Abnormal ECG When compared with ECG of 03-FEB-2024 17:11, T wave inversion now evident in Lateral leads    ECG 12 lead    Result Date: 2/6/2024  Sinus rhythm with 1st degree AV block Moderate voltage criteria for LVH, may be normal variant ( Sokolow-Wheatley , [place] product ) T wave abnormality, consider inferior ischemia Abnormal ECG When compared with ECG of 16-MAY-2023 19:59, Previous ECG has undetermined rhythm, needs review See ED provider note for full interpretation and clinical correlation Confirmed by Kenyatta Rodriguez ([id number]) on 2/6/2024 6:07:21 PM    XR chest 1 view    Result Date: 2/3/2024  STUDY: Chest Radiograph;  2/3/2024 5:22 PM INDICATION: Syncope. COMPARISON: 1/30/2024 XR chest. ACCESSION NUMBER(S): [id number] ORDERING CLINICIAN: KAYLEEN KINSEY TECHNIQUE:  Frontal chest was obtained at 17:21 hours. FINDINGS: " CARDIOMEDIASTINAL SILHOUETTE: Cardiomediastinal silhouette is normal in size and configuration.  LUNGS: There are diffuse interstitial changes unchanged from prior exams.  In addition, there is a new infiltrate in the right lung base.  ABDOMEN: No remarkable upper abdominal findings.  BONES: No acute osseous changes.    Right lower lobe infiltrate on a background of interstitial change. Signed by Lawson Viveros MD    XR chest 2 views    Result Date: 1/30/2024  STUDY: Chest Radiographs;  1/30/2024 at 4:00 PM. INDICATION: Cough. COMPARISON: XR chest 1/30/2023, 1/26/2023, 1/12/2023. ACCESSION NUMBER(S): [id number] ORDERING CLINICIAN: SHELLI SANTOS TECHNIQUE:  Frontal and lateral chest. FINDINGS: CARDIOMEDIASTINAL SILHOUETTE: Cardiomediastinal silhouette is mildly enlarged but stable.  LUNGS: Mild chronic interstitial changes noted.  ABDOMEN: No remarkable upper abdominal findings.  BONES: No acute osseous changes.    Mild unchanged cardiomegaly with chronic interstitial changes. No acute process. Signed by Mil Ford MD       Assessment/Plan   1.  ESKD on hemodialysis on Monday/Wednesday/Friday  Missed dialysis today  Dialysis orders were placed for today ASAP  2.  Life-threatening hyperkalemia,  With history of GI bleeding from polyps  Lokelma 10 mg p.o. 3 times daily, first dose ASAP  3.  Metabolic bone disease; due to CKD              I spent 54 minutes in the professional and overall care of this patient.      Pinky Barreto MD

## 2024-02-16 NOTE — ED TRIAGE NOTES
MWF dialysis Pt to ed from home with c/o black tarry stools for two days. Pt denies dizziness or blood thinning agents. Pt skipped dialysis appointment for ED visit.

## 2024-02-17 VITALS
RESPIRATION RATE: 16 BRPM | SYSTOLIC BLOOD PRESSURE: 111 MMHG | DIASTOLIC BLOOD PRESSURE: 64 MMHG | BODY MASS INDEX: 19.61 KG/M2 | OXYGEN SATURATION: 100 % | HEART RATE: 86 BPM | WEIGHT: 122 LBS | HEIGHT: 66 IN | TEMPERATURE: 97.7 F

## 2024-02-17 DIAGNOSIS — K92.2 GI BLEED: ICD-10-CM

## 2024-02-17 LAB
ANION GAP SERPL CALC-SCNC: 15 MMOL/L (ref 10–20)
BUN SERPL-MCNC: 27 MG/DL (ref 6–23)
CALCIUM SERPL-MCNC: 7.3 MG/DL (ref 8.6–10.3)
CHLORIDE SERPL-SCNC: 96 MMOL/L (ref 98–107)
CO2 SERPL-SCNC: 29 MMOL/L (ref 21–32)
CREAT SERPL-MCNC: 3.95 MG/DL (ref 0.5–1.05)
EGFRCR SERPLBLD CKD-EPI 2021: 11 ML/MIN/1.73M*2
ERYTHROCYTE [DISTWIDTH] IN BLOOD BY AUTOMATED COUNT: 16.9 % (ref 11.5–14.5)
GLUCOSE SERPL-MCNC: 85 MG/DL (ref 74–99)
HCT VFR BLD AUTO: 27.6 % (ref 36–46)
HGB BLD-MCNC: 8.8 G/DL (ref 12–16)
MCH RBC QN AUTO: 28.4 PG (ref 26–34)
MCHC RBC AUTO-ENTMCNC: 31.9 G/DL (ref 32–36)
MCV RBC AUTO: 89 FL (ref 80–100)
NRBC BLD-RTO: 0 /100 WBCS (ref 0–0)
PLATELET # BLD AUTO: 167 X10*3/UL (ref 150–450)
POTASSIUM SERPL-SCNC: 4.9 MMOL/L (ref 3.5–5.3)
RBC # BLD AUTO: 3.1 X10*6/UL (ref 4–5.2)
SODIUM SERPL-SCNC: 135 MMOL/L (ref 136–145)
WBC # BLD AUTO: 7.2 X10*3/UL (ref 4.4–11.3)

## 2024-02-17 PROCEDURE — 99239 HOSP IP/OBS DSCHRG MGMT >30: CPT | Performed by: INTERNAL MEDICINE

## 2024-02-17 PROCEDURE — 80048 BASIC METABOLIC PNL TOTAL CA: CPT

## 2024-02-17 PROCEDURE — 85027 COMPLETE CBC AUTOMATED: CPT

## 2024-02-17 PROCEDURE — 2500000002 HC RX 250 W HCPCS SELF ADMINISTERED DRUGS (ALT 637 FOR MEDICARE OP, ALT 636 FOR OP/ED): Performed by: INTERNAL MEDICINE

## 2024-02-17 PROCEDURE — 2500000002 HC RX 250 W HCPCS SELF ADMINISTERED DRUGS (ALT 637 FOR MEDICARE OP, ALT 636 FOR OP/ED): Mod: MUE | Performed by: INTERNAL MEDICINE

## 2024-02-17 PROCEDURE — 2500000001 HC RX 250 WO HCPCS SELF ADMINISTERED DRUGS (ALT 637 FOR MEDICARE OP): Performed by: INTERNAL MEDICINE

## 2024-02-17 PROCEDURE — 36415 COLL VENOUS BLD VENIPUNCTURE: CPT

## 2024-02-17 PROCEDURE — 99221 1ST HOSP IP/OBS SF/LOW 40: CPT | Performed by: INTERNAL MEDICINE

## 2024-02-17 PROCEDURE — 94640 AIRWAY INHALATION TREATMENT: CPT

## 2024-02-17 RX ORDER — PANTOPRAZOLE SODIUM 40 MG/1
TABLET, DELAYED RELEASE ORAL
Qty: 90 TABLET | Refills: 1 | Status: SHIPPED | OUTPATIENT
Start: 2024-02-17 | End: 2024-02-18

## 2024-02-17 RX ORDER — MIRTAZAPINE 15 MG/1
7.5 TABLET, FILM COATED ORAL NIGHTLY
Status: DISCONTINUED | OUTPATIENT
Start: 2024-02-17 | End: 2024-02-17 | Stop reason: HOSPADM

## 2024-02-17 RX ORDER — ACETAMINOPHEN 160 MG/5ML
650 SOLUTION ORAL EVERY 4 HOURS PRN
Status: DISCONTINUED | OUTPATIENT
Start: 2024-02-17 | End: 2024-02-17 | Stop reason: HOSPADM

## 2024-02-17 RX ORDER — ACETAMINOPHEN 650 MG/1
650 SUPPOSITORY RECTAL EVERY 4 HOURS PRN
Status: DISCONTINUED | OUTPATIENT
Start: 2024-02-17 | End: 2024-02-17 | Stop reason: HOSPADM

## 2024-02-17 RX ORDER — PANTOPRAZOLE SODIUM 40 MG/1
40 TABLET, DELAYED RELEASE ORAL
Qty: 30 TABLET | Refills: 2 | Status: SHIPPED | OUTPATIENT
Start: 2024-02-18 | End: 2024-02-17

## 2024-02-17 RX ORDER — PANTOPRAZOLE SODIUM 40 MG/1
40 TABLET, DELAYED RELEASE ORAL
Status: DISCONTINUED | OUTPATIENT
Start: 2024-02-17 | End: 2024-02-17 | Stop reason: HOSPADM

## 2024-02-17 RX ORDER — ISOSORBIDE MONONITRATE 30 MG/1
60 TABLET, EXTENDED RELEASE ORAL DAILY
Status: DISCONTINUED | OUTPATIENT
Start: 2024-02-17 | End: 2024-02-17 | Stop reason: HOSPADM

## 2024-02-17 RX ORDER — TALC
3 POWDER (GRAM) TOPICAL DAILY
Status: DISCONTINUED | OUTPATIENT
Start: 2024-02-17 | End: 2024-02-17 | Stop reason: HOSPADM

## 2024-02-17 RX ORDER — GUAIFENESIN 600 MG/1
600 TABLET, EXTENDED RELEASE ORAL EVERY 12 HOURS PRN
Status: DISCONTINUED | OUTPATIENT
Start: 2024-02-17 | End: 2024-02-17 | Stop reason: HOSPADM

## 2024-02-17 RX ORDER — ACETAMINOPHEN 325 MG/1
650 TABLET ORAL EVERY 4 HOURS PRN
Status: DISCONTINUED | OUTPATIENT
Start: 2024-02-17 | End: 2024-02-17 | Stop reason: HOSPADM

## 2024-02-17 RX ORDER — PANTOPRAZOLE SODIUM 40 MG/10ML
40 INJECTION, POWDER, LYOPHILIZED, FOR SOLUTION INTRAVENOUS
Status: DISCONTINUED | OUTPATIENT
Start: 2024-02-17 | End: 2024-02-17 | Stop reason: HOSPADM

## 2024-02-17 RX ORDER — ONDANSETRON HYDROCHLORIDE 2 MG/ML
4 INJECTION, SOLUTION INTRAVENOUS EVERY 8 HOURS PRN
Status: DISCONTINUED | OUTPATIENT
Start: 2024-02-17 | End: 2024-02-17 | Stop reason: HOSPADM

## 2024-02-17 RX ORDER — ONDANSETRON 4 MG/1
4 TABLET, FILM COATED ORAL EVERY 8 HOURS PRN
Status: DISCONTINUED | OUTPATIENT
Start: 2024-02-17 | End: 2024-02-17 | Stop reason: HOSPADM

## 2024-02-17 RX ORDER — SEVELAMER CARBONATE 800 MG/1
800 TABLET, FILM COATED ORAL DAILY
Status: DISCONTINUED | OUTPATIENT
Start: 2024-02-17 | End: 2024-02-17 | Stop reason: HOSPADM

## 2024-02-17 RX ORDER — POLYETHYLENE GLYCOL 3350 17 G/17G
17 POWDER, FOR SOLUTION ORAL DAILY PRN
Status: DISCONTINUED | OUTPATIENT
Start: 2024-02-17 | End: 2024-02-17 | Stop reason: HOSPADM

## 2024-02-17 RX ORDER — ATORVASTATIN CALCIUM 80 MG/1
80 TABLET, FILM COATED ORAL DAILY
Status: DISCONTINUED | OUTPATIENT
Start: 2024-02-17 | End: 2024-02-17 | Stop reason: HOSPADM

## 2024-02-17 RX ADMIN — ATORVASTATIN CALCIUM 80 MG: 80 TABLET, FILM COATED ORAL at 09:12

## 2024-02-17 RX ADMIN — TIOTROPIUM BROMIDE INHALATION SPRAY 2 PUFF: 3.12 SPRAY, METERED RESPIRATORY (INHALATION) at 09:02

## 2024-02-17 RX ADMIN — SEVELAMER CARBONATE 800 MG: 800 TABLET, FILM COATED ORAL at 09:12

## 2024-02-17 RX ADMIN — PANTOPRAZOLE SODIUM 40 MG: 40 TABLET, DELAYED RELEASE ORAL at 09:12

## 2024-02-17 RX ADMIN — ISOSORBIDE MONONITRATE 60 MG: 30 TABLET, EXTENDED RELEASE ORAL at 09:12

## 2024-02-17 ASSESSMENT — COGNITIVE AND FUNCTIONAL STATUS - GENERAL
DAILY ACTIVITIY SCORE: 21
DRESSING REGULAR LOWER BODY CLOTHING: A LITTLE
TOILETING: A LITTLE
CLIMB 3 TO 5 STEPS WITH RAILING: A LITTLE
MOBILITY SCORE: 21
WALKING IN HOSPITAL ROOM: A LITTLE
STANDING UP FROM CHAIR USING ARMS: A LITTLE
HELP NEEDED FOR BATHING: A LITTLE

## 2024-02-17 ASSESSMENT — PAIN SCALES - GENERAL: PAINLEVEL_OUTOF10: 0 - NO PAIN

## 2024-02-17 NOTE — CARE PLAN
The patient's goals for the shift include      The clinical goals for the shift include Patient to remain safe and free of fall during shift    Over the shift, the patient did not make progress toward the following goals. Barriers to progression include . Recommendations to address these barriers include .

## 2024-02-17 NOTE — CONSULTS
Reason For Consult  GI bleeding    History Of Present Illness  Alyce Dougherty is a 84 y.o. female presenting with apparent GI bleeding.  Patient has a history of anemia, end-stage renal disease, hypertension, breast cancer, dialysis on Monday Wednesday and Friday, previous GI bleed in 6/2023 that led to colonoscopy showing hemorrhoids and diverticulosis.  She also has constipation, hyperlipidemia and a cardiomyopathy.  She is sitting up eating breakfast and denies any black stool.  She had a brown stool with some dark red blood.  This in her words was similar to previous episodes.  She also had a normal bowel movement today with brown stool.  She firmly denies any black stool like the color of her hat or the cover of her breakfast container.  She denies any abdominal pain and otherwise feels well.    Her medications are reviewed.  Her lab data is also reviewed    She had dialysis yesterday    She had colonoscopy in 3/22/2023 showing hemorrhoids and diverticulosis.  Upper endoscopy in 10/8/2020 showed hiatal hernia, Schatzki's ring and gastric erosions    .     Past Medical History  She has a past medical history of Chronic combined systolic and diastolic congestive heart failure (CMS/HCC) (01/31/2024), Chronic systolic (congestive) heart failure (CMS/HCC) (06/26/2022), End stage renal disease (CMS/HCC) (06/26/2022), Essential hypertension (03/14/2023), Hyperlipidemia (03/14/2023), Metabolic encephalopathy (07/20/2022), and PAD (peripheral artery disease) (CMS/HCC) (03/14/2023).    Surgical History  She has a past surgical history that includes Other surgical history (10/03/2020); Other surgical history (10/03/2020); Other surgical history (10/03/2020); Other surgical history (10/03/2020); Other surgical history (10/03/2020); Other surgical history (10/03/2020); Other surgical history (10/03/2020); Other surgical history (10/03/2020); CT angio abdomen pelvis w and or wo IV IV contrast (3/19/2023); and CT angio  "abdomen pelvis w and or wo IV IV contrast (6/7/2023).     Social History  She reports that she has never smoked. She has never used smokeless tobacco. She reports that she does not currently use alcohol. She reports that she does not currently use drugs.    Family History  Family History   Problem Relation Name Age of Onset    Other (CVA) Mother      Coronary artery disease Mother      Lymphoma Father      Breast cancer Sister      Breast cancer Daughter          Allergies  Lisinopril, Hydralazine, Metoprolol, and Hydrochlorothiazide    Review of Systems  Review of systems otherwise unremarkable she is sitting up eating breakfast     Physical Exam  Pleasant woman no apparent distress  Lungs clear  Heart regular rate and rhythm  Abdomen soft and nontender     Last Recorded Vitals  Blood pressure 126/66, pulse 86, temperature 37.5 °C (99.5 °F), temperature source Temporal, resp. rate 17, height 1.676 m (5' 6\"), weight 55.3 kg (122 lb), SpO2 99 %.    Relevant Results  Results for orders placed or performed during the hospital encounter of 02/16/24 (from the past 24 hour(s))   Comprehensive metabolic panel   Result Value Ref Range    Glucose 89 74 - 99 mg/dL    Sodium 130 (L) 136 - 145 mmol/L    Potassium 6.9 (HH) 3.5 - 5.3 mmol/L    Chloride 91 (L) 98 - 107 mmol/L    Bicarbonate 26 21 - 32 mmol/L    Anion Gap 20 10 - 20 mmol/L    Urea Nitrogen 65 (H) 6 - 23 mg/dL    Creatinine 6.34 (H) 0.50 - 1.05 mg/dL    eGFR 6 (L) >60 mL/min/1.73m*2    Calcium 7.5 (L) 8.6 - 10.3 mg/dL    Albumin 2.9 (L) 3.4 - 5.0 g/dL    Alkaline Phosphatase 147 (H) 33 - 136 U/L    Total Protein 6.0 (L) 6.4 - 8.2 g/dL    AST 15 9 - 39 U/L    Bilirubin, Total 0.3 0.0 - 1.2 mg/dL    ALT 11 7 - 45 U/L   CBC and Auto Differential   Result Value Ref Range    WBC 9.4 4.4 - 11.3 x10*3/uL    nRBC 0.0 0.0 - 0.0 /100 WBCs    RBC 2.09 (L) 4.00 - 5.20 x10*6/uL    Hemoglobin 6.3 (LL) 12.0 - 16.0 g/dL    Hematocrit 20.1 (L) 36.0 - 46.0 %    MCV 96 80 - 100 fL    " MCH 30.1 26.0 - 34.0 pg    MCHC 31.3 (L) 32.0 - 36.0 g/dL    RDW 16.1 (H) 11.5 - 14.5 %    Platelets 182 150 - 450 x10*3/uL    Neutrophils % 65.9 40.0 - 80.0 %    Immature Granulocytes %, Automated 0.3 0.0 - 0.9 %    Lymphocytes % 22.4 13.0 - 44.0 %    Monocytes % 6.2 2.0 - 10.0 %    Eosinophils % 4.5 0.0 - 6.0 %    Basophils % 0.7 0.0 - 2.0 %    Neutrophils Absolute 6.21 (H) 1.60 - 5.50 x10*3/uL    Immature Granulocytes Absolute, Automated 0.03 0.00 - 0.50 x10*3/uL    Lymphocytes Absolute 2.11 0.80 - 3.00 x10*3/uL    Monocytes Absolute 0.58 0.05 - 0.80 x10*3/uL    Eosinophils Absolute 0.42 (H) 0.00 - 0.40 x10*3/uL    Basophils Absolute 0.07 0.00 - 0.10 x10*3/uL   Protime-INR   Result Value Ref Range    Protime 11.6 9.8 - 12.8 seconds    INR 1.0 0.9 - 1.1   APTT   Result Value Ref Range    aPTT 36 27 - 38 seconds   Prepare RBC: 2 Units   Result Value Ref Range    PRODUCT CODE X6498D52     Unit Number [id number]     Unit ABO O     Unit RH POS     XM INTEP COMP     Dispense Status TR     Blood Expiration Date March 20, 2024 23:59 EDT     PRODUCT BLOOD TYPE 5100     UNIT VOLUME 350     PRODUCT CODE X7492V19     Unit Number [id number]     Unit ABO O     Unit RH POS     XM INTEP COMP     Dispense Status TR     Blood Expiration Date March 14, 2024 23:59 EDT     PRODUCT BLOOD TYPE 5100     UNIT VOLUME 350    Type and screen   Result Value Ref Range    ABO TYPE O     Rh TYPE POS     ANTIBODY SCREEN NEG    ECG 12 lead   Result Value Ref Range    Ventricular Rate 76 BPM    Atrial Rate 76 BPM    MS Interval 256 ms    QRS Duration 94 ms    QT Interval 430 ms    QTC Calculation(Bazett) 483 ms    P Axis 55 degrees    R Axis 16 degrees    T Axis -74 degrees    QRS Count 13 beats    Q Onset 211 ms    P Onset 83 ms    P Offset 142 ms    T Offset 426 ms    QTC Fredericia 465 ms   Basic Metabolic Panel   Result Value Ref Range    Glucose 85 74 - 99 mg/dL    Sodium 135 (L) 136 - 145 mmol/L    Potassium 4.9 3.5 - 5.3 mmol/L     Chloride 96 (L) 98 - 107 mmol/L    Bicarbonate 29 21 - 32 mmol/L    Anion Gap 15 10 - 20 mmol/L    Urea Nitrogen 27 (H) 6 - 23 mg/dL    Creatinine 3.95 (H) 0.50 - 1.05 mg/dL    eGFR 11 (L) >60 mL/min/1.73m*2    Calcium 7.3 (L) 8.6 - 10.3 mg/dL   CBC   Result Value Ref Range    WBC 7.2 4.4 - 11.3 x10*3/uL    nRBC 0.0 0.0 - 0.0 /100 WBCs    RBC 3.10 (L) 4.00 - 5.20 x10*6/uL    Hemoglobin 8.8 (L) 12.0 - 16.0 g/dL    Hematocrit 27.6 (L) 36.0 - 46.0 %    MCV 89 80 - 100 fL    MCH 28.4 26.0 - 34.0 pg    MCHC 31.9 (L) 32.0 - 36.0 g/dL    RDW 16.9 (H) 11.5 - 14.5 %    Platelets 167 150 - 450 x10*3/uL     XR chest 2 views    Result Date: 2/16/2024  Interpreted By:  Olya Castillo, STUDY: XR CHEST 2 VIEWS;  2/15/2024 12:41 pm   INDICATION: Signs/Symptoms:pna f/u.   COMPARISON: 02/03/2024   ACCESSION NUMBER(S): [id number]   ORDERING CLINICIAN: STEPHANIE COWAN   FINDINGS: Diffuse bilateral interstitial prominence is noted. The heart is enlarged. Streaky densities noted in the right lung base, which has slightly improved since the prior exam. There is a new patchy density in the right upper lung zone. There are probable small bilateral pleural effusions. No pneumothorax is seen.       Chronic lung changes with improvement patchy density in the right lung base and a new patchy density in the right upper lung zone. This may be related to infiltrates or atelectasis.   Small bilateral pleural effusions.   Cardiomegaly.     MACRO: None   Signed by: Olya Castillo 2/16/2024 5:45 PM Dictation workstation:   [id number]    ECG 12 lead    Result Date: 2/16/2024  Sinus rhythm with 1st degree AV block ST & T wave abnormality, consider inferolateral ischemia Prolonged QT Abnormal ECG When compared with ECG of 03-FEB-2024 17:11, T wave inversion now evident in Lateral leads    ECG 12 lead    Result Date: 2/6/2024  Sinus rhythm with 1st degree AV block Moderate voltage criteria for LVH, may be normal variant ( Sokolow-Wheatley , [place] product ) T  wave abnormality, consider inferior ischemia Abnormal ECG When compared with ECG of 16-MAY-2023 19:59, Previous ECG has undetermined rhythm, needs review See ED provider note for full interpretation and clinical correlation Confirmed by Kenyatta Rodriguez ([id number]) on 2/6/2024 6:07:21 PM    XR chest 1 view    Result Date: 2/3/2024  STUDY: Chest Radiograph;  2/3/2024 5:22 PM INDICATION: Syncope. COMPARISON: 1/30/2024 XR chest. ACCESSION NUMBER(S): [id number] ORDERING CLINICIAN: KAYLEEN KINSEY TECHNIQUE:  Frontal chest was obtained at 17:21 hours. FINDINGS: CARDIOMEDIASTINAL SILHOUETTE: Cardiomediastinal silhouette is normal in size and configuration.  LUNGS: There are diffuse interstitial changes unchanged from prior exams.  In addition, there is a new infiltrate in the right lung base.  ABDOMEN: No remarkable upper abdominal findings.  BONES: No acute osseous changes.    Right lower lobe infiltrate on a background of interstitial change. Signed by Lawson Viveros MD    XR chest 2 views    Result Date: 1/30/2024  STUDY: Chest Radiographs;  1/30/2024 at 4:00 PM. INDICATION: Cough. COMPARISON: XR chest 1/30/2023, 1/26/2023, 1/12/2023. ACCESSION NUMBER(S): [id number] ORDERING CLINICIAN: SHELLI SANTOS TECHNIQUE:  Frontal and lateral chest. FINDINGS: CARDIOMEDIASTINAL SILHOUETTE: Cardiomediastinal silhouette is mildly enlarged but stable.  LUNGS: Mild chronic interstitial changes noted.  ABDOMEN: No remarkable upper abdominal findings.  BONES: No acute osseous changes.    Mild unchanged cardiomegaly with chronic interstitial changes. No acute process. Signed by Mil Ford MD        Assessment/Plan     Patient is a 84-year-old with a history of multiple medical issues as outlined above with end-stage renal disease, breast cancer, gastroesophageal reflux, previous workup for bleeding resulting in hemorrhoids and diverticulosis.  She has known acid reflux.  There is no evidence of any significant bleeding and by history it  is questionable if she had even an upper GI bleed.  I would recommend pantoprazole 40 mg daily long-term.  I see no indication to pursue endoscopy or colonoscopy during this admission.  She can be discharged home on pantoprazole.    I spent 45 minutes in the professional and overall care of this patient.      Jake Story MD

## 2024-02-17 NOTE — PROCEDURES
Alyce Dougherty is a 84 y.o. female on day 1 of admission presenting with GI bleed.    Subjective   Ms. Dougherty is a very pleasant 84-year-old woman with a history of end-stage kidney disease on hemodialysis on Monday, Wednesday, and Friday at [facility].  She has heart failure with reduced EF, hypertension, dyslipidemia, peripheral vascular disease.  She noted dark red stools, she felt weak, blood work noted anemia for which she came in.  She was dialyzed yesterday without issue.    She was seen by Dr. Story.  He notes 2 previous GI bleed in June 2023, hemorrhoids and diverticulosis were noted.  The recommendation was home with pantoprazole for now.       Objective       Physical Exam  Constitutional:       Appearance: Normal appearance.   HENT:      Mouth/Throat:      Mouth: Mucous membranes are moist.   Eyes:      Extraocular Movements: Extraocular movements intact.      Pupils: Pupils are equal, round, and reactive to light.   Cardiovascular:      Rate and Rhythm: Regular rhythm.      Heart sounds: S1 normal and S2 normal.   Pulmonary:      Breath sounds: Normal breath sounds.   Abdominal:      Comments: Soft, NT/ND, no masses, normal bowel sounds   Genitourinary:     Comments: No ortiz  Musculoskeletal:      Right lower leg: No edema.      Left lower leg: No edema.   Skin:     General: Skin is warm and dry.   Neurological:      General: No focal deficit present.      Mental Status: She is alert and oriented to person, place, and time.   Psychiatric:         Behavior: Behavior normal.    Right upper extremity AV fistula within normal limits.    Meds    Current Facility-Administered Medications:     acetaminophen (Tylenol) tablet 650 mg, 650 mg, oral, q4h PRN **OR** acetaminophen (Tylenol) oral liquid 650 mg, 650 mg, oral, q4h PRN **OR** acetaminophen (Tylenol) suppository 650 mg, 650 mg, rectal, q4h PRN, Chanelle Corado MD    atorvastatin (Lipitor) tablet 80 mg, 80 mg, oral, Daily, Chanelle Corado MD, 80 mg at  "02/17/24 0912    guaiFENesin (Mucinex) 12 hr tablet 600 mg, 600 mg, oral, q12h PRN, Chanelle Corado MD    isosorbide mononitrate ER (Imdur) 24 hr tablet 60 mg, 60 mg, oral, Daily, Chanelle Corado MD, 60 mg at 02/17/24 0912    melatonin tablet 3 mg, 3 mg, oral, Daily, Chanelle Corado MD    mirtazapine (Remeron) tablet 7.5 mg, 7.5 mg, oral, Nightly, Chanelle Corado MD    ondansetron (Zofran) tablet 4 mg, 4 mg, oral, q8h PRN **OR** ondansetron (Zofran) injection 4 mg, 4 mg, intravenous, q8h PRN, Chanelle Corado MD    pantoprazole (ProtoNix) EC tablet 40 mg, 40 mg, oral, Daily before breakfast, 40 mg at 02/17/24 0912 **OR** pantoprazole (ProtoNix) injection 40 mg, 40 mg, intravenous, Daily before breakfast, Chanelle Corado MD    polyethylene glycol (Glycolax, Miralax) packet 17 g, 17 g, oral, Daily PRN, Chanelle Corado MD    sevelamer carbonate (Renvela) tablet 800 mg, 800 mg, oral, Daily, Chanelle Corado MD, 800 mg at 02/17/24 0912    tiotropium (Spiriva Respimat) 2.5 mcg/actuation inhaler 2 puff, 2 Inhalation, inhalation, Daily, Chanelle Corado MD, 2 puff at 02/17/24 0902   There are no discontinued medications.     Allergies  Allergies   Allergen Reactions    Lisinopril Angioedema    Hydralazine Unknown, Itching and Other    Metoprolol Itching    Hydrochlorothiazide Itching, Rash and Unknown     Feels too tired and voids to often on this medication and refuses to use.        Last Recorded Vitals  Blood pressure 111/64, pulse 86, temperature 36.5 °C (97.7 °F), temperature source Temporal, resp. rate 16, height 1.676 m (5' 6\"), weight 55.3 kg (122 lb), SpO2 100 %.  Intake/Output last 3 Shifts:  I/O last 3 completed shifts:  In: 4415 (79.8 mL/kg) [I.V.:1600 (28.9 mL/kg); Blood:2215; Other:600]  Out: 1500 (27.1 mL/kg) [Other:1500]  Weight: 55.3 kg     Last 24 hour Results  Results for orders placed or performed during the hospital encounter of 02/16/24 (from the past 24 hour(s))   ECG 12 lead   Result Value Ref Range    " Ventricular Rate 76 BPM    Atrial Rate 76 BPM    NM Interval 256 ms    QRS Duration 94 ms    QT Interval 430 ms    QTC Calculation(Bazett) 483 ms    P Axis 55 degrees    R Axis 16 degrees    T Axis -74 degrees    QRS Count 13 beats    Q Onset 211 ms    P Onset 83 ms    P Offset 142 ms    T Offset 426 ms    QTC Fredericia 465 ms   Basic Metabolic Panel   Result Value Ref Range    Glucose 85 74 - 99 mg/dL    Sodium 135 (L) 136 - 145 mmol/L    Potassium 4.9 3.5 - 5.3 mmol/L    Chloride 96 (L) 98 - 107 mmol/L    Bicarbonate 29 21 - 32 mmol/L    Anion Gap 15 10 - 20 mmol/L    Urea Nitrogen 27 (H) 6 - 23 mg/dL    Creatinine 3.95 (H) 0.50 - 1.05 mg/dL    eGFR 11 (L) >60 mL/min/1.73m*2    Calcium 7.3 (L) 8.6 - 10.3 mg/dL   CBC   Result Value Ref Range    WBC 7.2 4.4 - 11.3 x10*3/uL    nRBC 0.0 0.0 - 0.0 /100 WBCs    RBC 3.10 (L) 4.00 - 5.20 x10*6/uL    Hemoglobin 8.8 (L) 12.0 - 16.0 g/dL    Hematocrit 27.6 (L) 36.0 - 46.0 %    MCV 89 80 - 100 fL    MCH 28.4 26.0 - 34.0 pg    MCHC 31.9 (L) 32.0 - 36.0 g/dL    RDW 16.9 (H) 11.5 - 14.5 %    Platelets 167 150 - 450 x10*3/uL        Imaging results  XR chest 2 views    Result Date: 2/16/2024  Interpreted By:  Olya Castillo, STUDY: XR CHEST 2 VIEWS;  2/15/2024 12:41 pm   INDICATION: Signs/Symptoms:pna f/u.   COMPARISON: 02/03/2024   ACCESSION NUMBER(S): [id number]   ORDERING CLINICIAN: STEPHANIE COWAN   FINDINGS: Diffuse bilateral interstitial prominence is noted. The heart is enlarged. Streaky densities noted in the right lung base, which has slightly improved since the prior exam. There is a new patchy density in the right upper lung zone. There are probable small bilateral pleural effusions. No pneumothorax is seen.       Chronic lung changes with improvement patchy density in the right lung base and a new patchy density in the right upper lung zone. This may be related to infiltrates or atelectasis.   Small bilateral pleural effusions.   Cardiomegaly.     MACRO: None   Signed  by: Olya Castillo 2/16/2024 5:45 PM Dictation workstation:   [id number]    ECG 12 lead    Result Date: 2/16/2024  Sinus rhythm with 1st degree AV block ST & T wave abnormality, consider inferolateral ischemia Prolonged QT Abnormal ECG When compared with ECG of 03-FEB-2024 17:11, T wave inversion now evident in Lateral leads    ECG 12 lead    Result Date: 2/6/2024  Sinus rhythm with 1st degree AV block Moderate voltage criteria for LVH, may be normal variant ( Sokolow-Wheatley , [place] product ) T wave abnormality, consider inferior ischemia Abnormal ECG When compared with ECG of 16-MAY-2023 19:59, Previous ECG has undetermined rhythm, needs review See ED provider note for full interpretation and clinical correlation Confirmed by Kenyatta Rodriguez ([id number]) on 2/6/2024 6:07:21 PM    XR chest 1 view    Result Date: 2/3/2024  STUDY: Chest Radiograph;  2/3/2024 5:22 PM INDICATION: Syncope. COMPARISON: 1/30/2024 XR chest. ACCESSION NUMBER(S): [id number] ORDERING CLINICIAN: KAYLEEN KINSEY TECHNIQUE:  Frontal chest was obtained at 17:21 hours. FINDINGS: CARDIOMEDIASTINAL SILHOUETTE: Cardiomediastinal silhouette is normal in size and configuration.  LUNGS: There are diffuse interstitial changes unchanged from prior exams.  In addition, there is a new infiltrate in the right lung base.  ABDOMEN: No remarkable upper abdominal findings.  BONES: No acute osseous changes.    Right lower lobe infiltrate on a background of interstitial change. Signed by Lawson Viveros MD    XR chest 2 views    Result Date: 1/30/2024  STUDY: Chest Radiographs;  1/30/2024 at 4:00 PM. INDICATION: Cough. COMPARISON: XR chest 1/30/2023, 1/26/2023, 1/12/2023. ACCESSION NUMBER(S): [id number] ORDERING CLINICIAN: SHELLI SANTOS TECHNIQUE:  Frontal and lateral chest. FINDINGS: CARDIOMEDIASTINAL SILHOUETTE: Cardiomediastinal silhouette is mildly enlarged but stable.  LUNGS: Mild chronic interstitial changes noted.  ABDOMEN: No remarkable upper abdominal  findings.  BONES: No acute osseous changes.    Mild unchanged cardiomegaly with chronic interstitial changes. No acute process. Signed by Mil Ford MD       Assessment and Plan  Ms. Dougherty is a very pleasant 84-year-old woman with a history of end-stage kidney disease on hemodialysis on Monday, Wednesday, and Friday at [facility].  She has heart failure with reduced EF, hypertension, dyslipidemia, peripheral vascular disease.  She noted dark red stools, she felt weak, blood work noted anemia for which she came in.  She was dialyzed yesterday without issue.    She was seen by Dr. Story.  He notes 2 previous GI bleed in June 2023, hemorrhoids and diverticulosis were noted.  The recommendation was home with pantoprazole for now.    Ms. Dougherty did present with blood in the stool, likely diverticulosis.  I am concerned his hemoglobin was 12 g/dL in January, 10.7 earlier this month, comes in at 6.3 g/dL.  She has been receiving an DELMI, I will make certain that that continues.  I am guessing that this was a diverticular bleed, no inflammation.  It looks as if she will be discharged today, I will see her this Monday at [facility].       I spent a total of 50 minutes with this patient today.    Blas Deal MD

## 2024-02-17 NOTE — CARE PLAN
The patient's goals for the shift include      The clinical goals for the shift include Patient to remain safe and free of fall during shift      Problem: Pain  Goal: My pain/discomfort is manageable  Outcome: Progressing     Problem: Safety  Goal: Patient will be injury free during hospitalization  Outcome: Progressing  Goal: I will remain free of falls  Outcome: Progressing     Problem: Daily Care  Goal: Daily care needs are met  Outcome: Progressing     Problem: Psychosocial Needs  Goal: Demonstrates ability to cope with hospitalization/illness  Outcome: Progressing  Goal: Collaborate with me, my family, and caregiver to identify my specific goals  Outcome: Progressing     Problem: Discharge Barriers  Goal: My discharge needs are met  Outcome: Progressing     Problem: Skin  Goal: Prevent/manage excess moisture  Outcome: Progressing  Flowsheets (Taken 2/17/2024 0012)  Prevent/manage excess moisture: Cleanse incontinence/protect with barrier cream  Goal: Prevent/minimize sheer/friction injuries  Outcome: Progressing  Flowsheets (Taken 2/17/2024 0012)  Prevent/minimize sheer/friction injuries:   HOB 30 degrees or less   Use pull sheet  Goal: Promote/optimize nutrition  Outcome: Progressing

## 2024-02-17 NOTE — PROGRESS NOTES
.Report to Receiving RN:    Report To: ford milan LPN  Time Report Called: 2140  Hand-Off Communication: at bedside  Complications During Treatment: No  Ultrafiltration Treatment: Yes, 1.5mL  Medications Administered During Dialysis: no  Blood Products Administered During Dialysis: Yes, see emr  Labs Sent During Dialysis: No  Heparin Drip Rate Changes: No    Electronic Signatures:  YOBANI Henry (      Last Updated: 9:38 PM by CELSO ORELLANA T

## 2024-02-17 NOTE — DISCHARGE SUMMARY
Discharge Diagnosis  GI bleed    Issues Requiring Follow-Up  Follow-up CBC    Test Results Pending At Discharge  Pending Labs       No current pending labs.            Hospital Course     Patient with a past medical history of end-stage renal disease on hemodialysis chronic combined systolic and diastolic congestive heart failure hypertension dyslipidemia peripheral artery disease was doing well until Tuesday when she noticed some black stool in her bowels  This was followed by multiple episodes of black stool over the last couple of days.  There was no associated abdominal pain cramping nausea vomiting denies any chest pain or shortness of breath or dizziness  Felt a little weak today so blood work was done which showed anemia, transfused 2 units of packed RBCs and admitted for further evaluation     Patient's blood count has come up to 8.3 after blood 2 units of blood  Seen by gastroenterology  They do not indicate a recommend endoscopy or colonoscopy during this admission and are okay with discharging the patient on pantoprazole  The patient to is very keen on getting home today  We will discharge the patient home and follow-up CBC BMP    Pertinent Physical Exam At Time of Discharge  Physical Exam    Constitutional   General appearance: Alert and in no acute distress.     Pulmonary   Respiratory assessment: No respiratory distress, normal respiratory rhythm and effort.    Auscultation of Lungs: Clear bilateral breath sounds.   Cardiovascular   Auscultation of heart: Apical pulse normal, heart rate and rhythm normal, normal S1 and S2, no murmurs and no pericardial rub.    Exam for edema: No peripheral edema.   Abdomen   Abdominal Exam: No bruits, normal bowel sounds, soft, non-tender, no abdominal mass palpated.    Liver and Spleen exam: No hepato-splenomegaly.   Musculoskeletal   Examination of gait: Normal.    Inspection of digits and nails: No clubbing or cyanosis of the fingernails.    Inspection/palpation of  joints, bones and muscles: No joint swelling. Normal movement of all extremities.   Skin   Skin inspection: Normal skin color and pigmentation, normal skin turgor and no visible rash.   Neurologic   Cranial nerves: Nerves 2-12 were intact, no focal neuro defects.       Home Medications     Medication List      START taking these medications     pantoprazole 40 mg EC tablet; Commonly known as: ProtoNix; Take 1 tablet   (40 mg) by mouth once daily in the morning. Take before meals. Do not   crush, chew, or split. Do not start before February 18, 2024.; Start   taking on: February 18, 2024     CONTINUE taking these medications     aspirin 81 mg EC tablet   atorvastatin 80 mg tablet; Commonly known as: Lipitor; Take 1 tablet (80   mg) by mouth once daily.   carvedilol 6.25 mg tablet; Commonly known as: Coreg; Take 1 tablet (6.25   mg) by mouth 2 times a day. Please note decreased dose   gabapentin 100 mg capsule; Commonly known as: Neurontin; Take 1 capsule   (100 mg) by mouth once daily at bedtime.   guaiFENesin 600 mg 12 hr tablet; Commonly known as: Mucinex; Take 1   tablet (600 mg) by mouth every 12 hours if needed for cough for up to 7   days. Do not crush, chew, or split.   isosorbide mononitrate ER 30 mg 24 hr tablet; Commonly known as: Imdur;   Take 2 tablets (60 mg) by mouth once daily.   magnesium oxide 400 mg (241.3 mg magnesium) tablet; Commonly known as:   Mag-Ox   melatonin 3 mg tablet   mirtazapine 7.5 mg tablet; Commonly known as: Remeron; Take 1 tablet   (7.5 mg) by mouth once daily at bedtime.   sevelamer HCl 800 mg tablet; Commonly known as: Renagel; Take 1 tablet   (800 mg) by mouth once daily. Before meals   tiotropium 18 mcg inhalation capsule; Commonly known as: Spiriva with   HandiHaler; Place 1 capsule (18 mcg) into inhaler and inhale once daily at   bedtime.     STOP taking these medications     amoxicillin-pot clavulanate 250-125 mg tablet; Commonly known as:   Augmentin       Outpatient  Follow-Up  No future appointments.    Patient seen at bedside. Events from the last visit reviewed. Discussed with staff. Results of tests and investigations from last visit reviewed and discussed with patient/Family. Electronic chart on [facility] reviewed. Input / Recommendations  from consultants  appreciated and reviewed and agreed with.     discharge summary and profile completed. medications reviewed and discussed with patient and family.  scripts completed and signed.     total discharge time in excess of 30 minutes.    Chanelle Corado MD

## 2024-02-18 RX ORDER — PANTOPRAZOLE SODIUM 40 MG/1
TABLET, DELAYED RELEASE ORAL
Qty: 270 TABLET | Refills: 0 | Status: SHIPPED | OUTPATIENT
Start: 2024-02-18 | End: 2024-04-23 | Stop reason: SDUPTHER

## 2024-02-19 ENCOUNTER — PATIENT OUTREACH (OUTPATIENT)
Dept: CARE COORDINATION | Facility: CLINIC | Age: 84
End: 2024-02-19
Payer: COMMERCIAL

## 2024-02-19 NOTE — PROGRESS NOTES
/Discharge Facility:[facility]  Discharge Diagnosis:GI Bleed  Admission Date:02/16/24  Discharge Date: 02/18/24    PCP Appointment Date:02/26/24  Specialist Appointment Date:   Hospital Encounter and Summary: Linked   See discharge assessment below for further details  Engagement  Call Start Time: 0909 (2/19/2024  9:09 AM)    Medications  Medications reviewed with patient/caregiver?: Yes (protonix 40mg) (2/19/2024  9:09 AM)  Is the patient having any side effects they believe may be caused by any medication additions or changes?: No (2/19/2024  9:09 AM)  Does the patient have all medications ordered at discharge?: Yes (2/19/2024  9:09 AM)  Is the patient taking all medications as directed (includes completed medication regime)?: Yes (2/19/2024  9:09 AM)    Appointments  Does the patient have a primary care provider?: Yes (2/19/2024  9:09 AM)  Care Management Interventions: Verified appointment date/time/provider (2/19/2024  9:09 AM)  Care Management Interventions: Advised patient to keep appointment (2/6/2024  8:25 AM)    Self Management  Has home health visited the patient within 72 hours of discharge?: No (2/19/2024  9:09 AM)  Has all Durable Medical Equipment (DME) been delivered?: No (2/19/2024  9:09 AM)    Patient Teaching  Does the patient have access to their discharge instructions?: Yes (2/19/2024  9:09 AM)  Care Management Interventions: Reviewed instructions with patient (2/19/2024  9:09 AM)  What is the patient's perception of their health status since discharge?: Improving (2/19/2024  9:09 AM)    Wrap Up  Call End Time: 0915 (2/19/2024  9:09 AM)

## 2024-02-23 NOTE — DOCUMENTATION CLARIFICATION NOTE
"    PATIENT:               SOLA MORLEY  ACCT #:                  [id number]  MRN:                       [id number]  :                       1940  ADMIT DATE:       2024 11:08 AM  DISCH DATE:        2024 4:00 PM  RESPONDING PROVIDER #:        [id number]          PROVIDER RESPONSE TEXT:    Acute blood loss anemia    CDI QUERY TEXT:    [facility]_Anemia Specificity        Instruction:    Based on your assessment of the patient and the clinical information, please provide the requested documentation by clicking on the appropriate radio button and enter any additional information if prompted.    Question: Please further clarify the diagnosis of anemia as      When answering this query, please exercise your independent professional judgment. The fact that a question is being asked, does not imply that any particular answer is desired or expected.    The patient's clinical indicators include:  Clinical Information:  \"..she noticed some black stool in her bowels\" per discharge summary    Clinical Indicators:  \"..blood work was done which showed anemia..\"  per discharge summary    \"...his hemoglobin was 12 g/dL in January, 10.7 earlier this month, comes in at 6.3 g/dL\" per nephrology 24    Treatment:    \"..transfused 2 units of packed RBCs..\"  per discharge summary    Risk Factors:    \"Ms. Morley did present with blood in the stool, likely diverticulosis. \" per nephrology 24  Options provided:  -- Acute blood loss anemia  -- Acute on chronic blood loss anemia  -- Other - I will add my own diagnosis  -- Refer to Clinical Documentation Reviewer    Query created by: Brenda Camarena on 2024 10:44 PM      Electronically signed by:  STEPHANIE COWAN MD 2024 11:00 AM          "

## 2024-02-23 NOTE — DOCUMENTATION CLARIFICATION NOTE
"    PATIENT:               SOLA MORLEY  ACCT #:                  [id number]  MRN:                       [id number]  :                       1940  ADMIT DATE:       2024 11:08 AM  DISCH DATE:        2024 4:00 PM  RESPONDING PROVIDER #:        [id number]          PROVIDER RESPONSE TEXT:    Diverticulosis with or without diverticulitis    CDI QUERY TEXT:    [facility]_GIB Etiology        Instruction:    Based on your assessment of the patient and the clinical information, please provide the requested documentation by clicking on the appropriate radio button and enter any additional information if prompted.    Question: If able, please clarify the most likely cause of the GI bleed        When answering this query, please exercise your independent professional judgment. The fact that a question is being asked, does not imply that any particular answer is desired or expected.    The patient's clinical indicators include:  Clinical Information: \".. presenting with GI bleed.\" per nephrology 24    Clinical Indicators:  \" likely diverticulosis.\" per nephrology 24    \"...previous workup for bleeding resulting in hemorrhoids and diverticulosis. \" per GI consult    \"She had a brown stool with some dark red blood.\" per GI consult      .. by history it is questionable if she had even an upper GI bleed. \" per GI consult    Treatment:  \"transfused 2 units of packed RBCs and admitted for further evaluation\" per discharge summary    Risk Factors:    \"..GI bleed in 2023, hemorrhoids and diverticulosis were noted\" per nephrology 24  Options provided:  -- Diverticulosis with or without diverticulitis  -- Hemorrhoids  -- Other - I will add my own diagnosis  -- Refer to Clinical Documentation Reviewer    Query created by: Brenda Camarena on 2024 10:54 PM      Electronically signed by:  STEPHANIE COWAN MD 2024 11:00 AM          "

## 2024-02-25 LAB
ATRIAL RATE: 76 BPM
P AXIS: 55 DEGREES
P OFFSET: 142 MS
P ONSET: 83 MS
PR INTERVAL: 256 MS
Q ONSET: 211 MS
QRS COUNT: 13 BEATS
QRS DURATION: 94 MS
QT INTERVAL: 430 MS
QTC CALCULATION(BAZETT): 483 MS
QTC FREDERICIA: 465 MS
R AXIS: 16 DEGREES
T AXIS: -74 DEGREES
T OFFSET: 426 MS
VENTRICULAR RATE: 76 BPM

## 2024-02-26 ENCOUNTER — OFFICE VISIT (OUTPATIENT)
Dept: PRIMARY CARE | Facility: CLINIC | Age: 84
End: 2024-02-26
Payer: COMMERCIAL

## 2024-02-26 VITALS — TEMPERATURE: 98.8 F | BODY MASS INDEX: 19.95 KG/M2 | WEIGHT: 123.6 LBS

## 2024-02-26 DIAGNOSIS — D64.9 ANEMIA, UNSPECIFIED TYPE: Primary | ICD-10-CM

## 2024-02-26 DIAGNOSIS — M54.40 CHRONIC LOW BACK PAIN WITH SCIATICA, SCIATICA LATERALITY UNSPECIFIED, UNSPECIFIED BACK PAIN LATERALITY: ICD-10-CM

## 2024-02-26 DIAGNOSIS — I10 ESSENTIAL HYPERTENSION: Chronic | ICD-10-CM

## 2024-02-26 DIAGNOSIS — E78.49 OTHER HYPERLIPIDEMIA: ICD-10-CM

## 2024-02-26 DIAGNOSIS — I50.9 CHF, STAGE C (MULTI): ICD-10-CM

## 2024-02-26 DIAGNOSIS — G89.29 CHRONIC LOW BACK PAIN WITH SCIATICA, SCIATICA LATERALITY UNSPECIFIED, UNSPECIFIED BACK PAIN LATERALITY: ICD-10-CM

## 2024-02-26 PROCEDURE — 99495 TRANSJ CARE MGMT MOD F2F 14D: CPT | Performed by: INTERNAL MEDICINE

## 2024-02-26 PROCEDURE — 1126F AMNT PAIN NOTED NONE PRSNT: CPT | Performed by: INTERNAL MEDICINE

## 2024-02-26 PROCEDURE — 1157F ADVNC CARE PLAN IN RCRD: CPT | Performed by: INTERNAL MEDICINE

## 2024-02-26 PROCEDURE — 1036F TOBACCO NON-USER: CPT | Performed by: INTERNAL MEDICINE

## 2024-02-26 PROCEDURE — 1159F MED LIST DOCD IN RCRD: CPT | Performed by: INTERNAL MEDICINE

## 2024-02-26 PROCEDURE — 1111F DSCHRG MED/CURRENT MED MERGE: CPT | Performed by: INTERNAL MEDICINE

## 2024-02-26 RX ORDER — CARVEDILOL 6.25 MG/1
TABLET ORAL
Qty: 180 TABLET | Refills: 1 | Status: SHIPPED | OUTPATIENT
Start: 2024-02-26

## 2024-02-26 RX ORDER — CARVEDILOL 6.25 MG/1
6.25 TABLET ORAL 2 TIMES DAILY
Qty: 90 TABLET | Refills: 3 | Status: SHIPPED | OUTPATIENT
Start: 2024-02-26 | End: 2024-02-26

## 2024-02-26 RX ORDER — ISOSORBIDE MONONITRATE 60 MG/1
60 TABLET, EXTENDED RELEASE ORAL DAILY
Qty: 90 TABLET | Refills: 3 | Status: SHIPPED | OUTPATIENT
Start: 2024-02-26 | End: 2025-02-25

## 2024-02-26 RX ORDER — ATORVASTATIN CALCIUM 80 MG/1
80 TABLET, FILM COATED ORAL DAILY
Qty: 90 TABLET | Refills: 2 | Status: SHIPPED | OUTPATIENT
Start: 2024-02-26 | End: 2024-11-22

## 2024-02-26 RX ORDER — GABAPENTIN 100 MG/1
100 CAPSULE ORAL NIGHTLY
Qty: 90 CAPSULE | Refills: 3 | Status: SHIPPED | OUTPATIENT
Start: 2024-02-26 | End: 2025-02-20

## 2024-02-26 NOTE — PROGRESS NOTES
Alyce Dougherty is a 84 y.o. female   Patient with a past medical history of HTN,  HLD, ESRD on hemodialysis (T, Th, Sat), HFrEF,  PAD, GERD,  Anemia of Chronic Disease, history of Breast CA status postlumpectomy s/p displaced fractures of left superior and inferior pubic rami after a fall, Pseudogout, Osteoarthritis    Admission to February 16, 2024  Discharge February 17, 2024    Admitting diagnosis was weakness and lethargy  Had blood loss anemia with possible melena    Patient's blood count has come up to 8.3 after blood 2 units of blood  Seen by gastroenterology  They do not indicate a recommend endoscopy or colonoscopy during this admission and are okay with discharging the patient on pantoprazole  Repeat CBC BMP              Review of Systems     Constitutional: no fever, no chills, not feeling poorly, not feeling tired and no recent weight gain, no recent weight loss.   ENT: no earache, no hearing loss, no nosebleeds, no nasal discharge, no sore throat and no hoarseness.   Cardiovascular: the heart rate was not slow, the heart rate was not fast, no chest pain, no palpitations, no intermittent leg claudication and no lower extremity edema.   Respiratory: no cough, wheezing or shortness of breath at rest or exertion  Gastrointestinal: no abdominal pain, no constipation, no melena, no nausea, no diarrhea, no vomiting and no blood in stools.   Musculoskeletal: no arthralgias, no myalgias, no back pain, no joint swelling, no joint stiffness, no limb pain and no limb swelling.   Integumentary: no skin rashes, no skin lesions, no itching, no skin wound and no dry skin.   Neurological: no headache, no confusion, no numbness, no dizziness, no tingling and no fainting.   All other systems have been reviewed and are negative for complaint.       There were no vitals filed for this visit.       Physical Exam     Constitutional   General appearance: Alert and in no acute distress.     Pulmonary   Respiratory assessment:  No respiratory distress, normal respiratory rhythm and effort.    Auscultation of Lungs: crackls  Cardiovascular   Auscultation of heart: Apical pulse normal, heart rate and rhythm normal, normal S1 and S2, PABLO and no pericardial rub.    Exam for edema: no edema  Abdomen   Abdominal Exam: No bruits, normal bowel sounds, soft, non-tender, no abdominal mass palpated.    Liver and Spleen exam: No hepato-splenomegaly.   Musculoskeletal   Examination of gait: Normal.    Inspection of digits and nails: No clubbing or cyanosis of the fingernails.    Inspection/palpation of joints, bones and muscles: No joint swelling. Normal movement of all extremities.   Skin   Skin inspection: Normal skin color and pigmentation, normal skin turgor and no visible rash.   Neurologic   Cranial nerves: Nerves 2-12 were intact, no focal neuro defects.     Assessment/Plan          Patient with a past medical history of HTN,  HLD, ESRD on hemodialysis (T, Th, Sat), HFrEF,  PAD, GERD,  Anemia of Chronic Disease, history of Breast CA status postlumpectomy s/p displaced fractures of left superior and inferior pubic rami after a fall, Pseudogout, Osteoarthritis    # Blood loss anemia  S/p transfusion  CBC  Continue PPI    # PNA  Completed Treatment  Will repeat Chest xrauy    # COPD  Chest xray  Mucinex    # HTN  Stable  Off Amlodipine and lasix  Continue current medications    # HLD  Stable  Continue current medications    # ESRD  On HD    # HFeEF  # Pedal edema  Stable  Off of lasix now

## 2024-03-01 ENCOUNTER — PATIENT OUTREACH (OUTPATIENT)
Dept: CARE COORDINATION | Facility: CLINIC | Age: 84
End: 2024-03-01
Payer: COMMERCIAL

## 2024-03-13 ENCOUNTER — LAB (OUTPATIENT)
Dept: LAB | Facility: LAB | Age: 84
End: 2024-03-13
Payer: COMMERCIAL

## 2024-03-13 ENCOUNTER — HOSPITAL ENCOUNTER (OUTPATIENT)
Dept: RADIOLOGY | Facility: HOSPITAL | Age: 84
Discharge: HOME | End: 2024-03-13
Payer: COMMERCIAL

## 2024-03-13 DIAGNOSIS — I50.9 CHF, STAGE C (MULTI): ICD-10-CM

## 2024-03-13 DIAGNOSIS — K92.2 GI BLEED: ICD-10-CM

## 2024-03-13 DIAGNOSIS — D64.9 ANEMIA, UNSPECIFIED TYPE: ICD-10-CM

## 2024-03-13 LAB
ANION GAP SERPL CALC-SCNC: 14 MMOL/L (ref 10–20)
BUN SERPL-MCNC: 9 MG/DL (ref 6–23)
CALCIUM SERPL-MCNC: 8.5 MG/DL (ref 8.6–10.3)
CHLORIDE SERPL-SCNC: 92 MMOL/L (ref 98–107)
CO2 SERPL-SCNC: 31 MMOL/L (ref 21–32)
CREAT SERPL-MCNC: 2.42 MG/DL (ref 0.5–1.05)
EGFRCR SERPLBLD CKD-EPI 2021: 19 ML/MIN/1.73M*2
ERYTHROCYTE [DISTWIDTH] IN BLOOD BY AUTOMATED COUNT: 16.1 % (ref 11.5–14.5)
FERRITIN SERPL-MCNC: 1798 NG/ML (ref 8–150)
FOLATE SERPL-MCNC: 11 NG/ML
GLUCOSE SERPL-MCNC: 73 MG/DL (ref 74–99)
HCT VFR BLD AUTO: 29.8 % (ref 36–46)
HGB BLD-MCNC: 9.2 G/DL (ref 12–16)
IRON SATN MFR SERPL: 22 % (ref 25–45)
IRON SERPL-MCNC: 68 UG/DL (ref 35–150)
MCH RBC QN AUTO: 29.3 PG (ref 26–34)
MCHC RBC AUTO-ENTMCNC: 30.9 G/DL (ref 32–36)
MCV RBC AUTO: 95 FL (ref 80–100)
NRBC BLD-RTO: 0 /100 WBCS (ref 0–0)
PLATELET # BLD AUTO: 157 X10*3/UL (ref 150–450)
POTASSIUM SERPL-SCNC: 3.4 MMOL/L (ref 3.5–5.3)
RBC # BLD AUTO: 3.14 X10*6/UL (ref 4–5.2)
SODIUM SERPL-SCNC: 134 MMOL/L (ref 136–145)
TIBC SERPL-MCNC: 309 UG/DL (ref 240–445)
UIBC SERPL-MCNC: 241 UG/DL (ref 110–370)
VIT B12 SERPL-MCNC: 940 PG/ML (ref 211–911)
WBC # BLD AUTO: 4.7 X10*3/UL (ref 4.4–11.3)

## 2024-03-13 PROCEDURE — 82607 VITAMIN B-12: CPT

## 2024-03-13 PROCEDURE — 83540 ASSAY OF IRON: CPT

## 2024-03-13 PROCEDURE — 85027 COMPLETE CBC AUTOMATED: CPT

## 2024-03-13 PROCEDURE — 83550 IRON BINDING TEST: CPT

## 2024-03-13 PROCEDURE — 82728 ASSAY OF FERRITIN: CPT

## 2024-03-13 PROCEDURE — 71046 X-RAY EXAM CHEST 2 VIEWS: CPT

## 2024-03-13 PROCEDURE — 80048 BASIC METABOLIC PNL TOTAL CA: CPT

## 2024-03-13 PROCEDURE — 36415 COLL VENOUS BLD VENIPUNCTURE: CPT

## 2024-03-13 PROCEDURE — 82746 ASSAY OF FOLIC ACID SERUM: CPT

## 2024-03-13 PROCEDURE — 71046 X-RAY EXAM CHEST 2 VIEWS: CPT | Performed by: RADIOLOGY

## 2024-03-15 ENCOUNTER — PATIENT OUTREACH (OUTPATIENT)
Dept: CARE COORDINATION | Facility: CLINIC | Age: 84
End: 2024-03-15
Payer: COMMERCIAL

## 2024-03-28 DIAGNOSIS — R63.0 POOR APPETITE: ICD-10-CM

## 2024-03-28 RX ORDER — MIRTAZAPINE 7.5 MG/1
7.5 TABLET, FILM COATED ORAL NIGHTLY
Qty: 30 TABLET | Refills: 0 | Status: SHIPPED | OUTPATIENT
Start: 2024-03-28 | End: 2024-05-06 | Stop reason: SDUPTHER

## 2024-04-23 DIAGNOSIS — K92.2 GI BLEED: ICD-10-CM

## 2024-04-23 RX ORDER — PANTOPRAZOLE SODIUM 40 MG/1
TABLET, DELAYED RELEASE ORAL
Qty: 270 TABLET | Refills: 1 | Status: SHIPPED | OUTPATIENT
Start: 2024-04-23 | End: 2024-04-25 | Stop reason: SDUPTHER

## 2024-04-25 DIAGNOSIS — K92.2 GI BLEED: ICD-10-CM

## 2024-04-25 RX ORDER — PANTOPRAZOLE SODIUM 40 MG/1
TABLET, DELAYED RELEASE ORAL
Qty: 270 TABLET | Refills: 1 | Status: SHIPPED | OUTPATIENT
Start: 2024-04-25

## 2024-05-06 DIAGNOSIS — R63.0 POOR APPETITE: ICD-10-CM

## 2024-05-06 RX ORDER — MIRTAZAPINE 7.5 MG/1
7.5 TABLET, FILM COATED ORAL NIGHTLY
Qty: 90 TABLET | Refills: 1 | Status: SHIPPED | OUTPATIENT
Start: 2024-05-06

## 2024-05-16 ENCOUNTER — PATIENT OUTREACH (OUTPATIENT)
Dept: CARE COORDINATION | Facility: CLINIC | Age: 84
End: 2024-05-16
Payer: COMMERCIAL

## 2024-05-20 ENCOUNTER — OFFICE VISIT (OUTPATIENT)
Dept: PRIMARY CARE | Facility: CLINIC | Age: 84
End: 2024-05-20
Payer: COMMERCIAL

## 2024-05-20 VITALS
DIASTOLIC BLOOD PRESSURE: 70 MMHG | RESPIRATION RATE: 16 BRPM | WEIGHT: 121 LBS | SYSTOLIC BLOOD PRESSURE: 130 MMHG | BODY MASS INDEX: 19.53 KG/M2 | TEMPERATURE: 98.5 F | HEART RATE: 66 BPM

## 2024-05-20 DIAGNOSIS — I10 ESSENTIAL HYPERTENSION: Primary | Chronic | ICD-10-CM

## 2024-05-20 DIAGNOSIS — N18.6 ESRD (END STAGE RENAL DISEASE) ON DIALYSIS (MULTI): Chronic | ICD-10-CM

## 2024-05-20 DIAGNOSIS — R05.3 CHRONIC COUGH: ICD-10-CM

## 2024-05-20 DIAGNOSIS — S03.00XA DISLOCATION OF TEMPOROMANDIBULAR JOINT, INITIAL ENCOUNTER: ICD-10-CM

## 2024-05-20 DIAGNOSIS — Z99.2 ESRD (END STAGE RENAL DISEASE) ON DIALYSIS (MULTI): Chronic | ICD-10-CM

## 2024-05-20 DIAGNOSIS — E78.2 MIXED HYPERLIPIDEMIA: Chronic | ICD-10-CM

## 2024-05-20 DIAGNOSIS — J44.9 CHRONIC OBSTRUCTIVE PULMONARY DISEASE, UNSPECIFIED COPD TYPE (MULTI): ICD-10-CM

## 2024-05-20 PROCEDURE — 1036F TOBACCO NON-USER: CPT | Performed by: INTERNAL MEDICINE

## 2024-05-20 PROCEDURE — 3075F SYST BP GE 130 - 139MM HG: CPT | Performed by: INTERNAL MEDICINE

## 2024-05-20 PROCEDURE — 1159F MED LIST DOCD IN RCRD: CPT | Performed by: INTERNAL MEDICINE

## 2024-05-20 PROCEDURE — 99214 OFFICE O/P EST MOD 30 MIN: CPT | Performed by: INTERNAL MEDICINE

## 2024-05-20 PROCEDURE — 3078F DIAST BP <80 MM HG: CPT | Performed by: INTERNAL MEDICINE

## 2024-05-20 PROCEDURE — 1157F ADVNC CARE PLAN IN RCRD: CPT | Performed by: INTERNAL MEDICINE

## 2024-05-20 RX ORDER — TIOTROPIUM BROMIDE 18 UG/1
1 CAPSULE ORAL; RESPIRATORY (INHALATION) NIGHTLY
Qty: 30 CAPSULE | Refills: 11 | Status: SHIPPED | OUTPATIENT
Start: 2024-05-20 | End: 2025-05-20

## 2024-05-20 NOTE — PROGRESS NOTES
Alyce Dougherty is a 84 y.o. female   Patient with a past medical history of HTN,  HLD, ESRD on hemodialysis (T, Th, Sat), HFrEF,  PAD, GERD,  Anemia of Chronic Disease, history of Breast CA status postlumpectomy s/p displaced fractures of left superior and inferior pubic rami after a fall, Pseudogout, Osteoarthritis    Has been coughing with phlegm  Only at night or early AM   Not taking the acid reflux meds  No SOB    Also c/o right ear pain    No chest pain/  SOB/ dizziness  BM OK  Energy level ok  Appetite OK  No melena  Good energy level             Review of Systems     Constitutional: no fever, no chills, not feeling poorly, not feeling tired and no recent weight gain, no recent weight loss.   ENT: no earache, no hearing loss, no nosebleeds, no nasal discharge, no sore throat and no hoarseness.   Cardiovascular: the heart rate was not slow, the heart rate was not fast, no chest pain, no palpitations, no intermittent leg claudication and no lower extremity edema.   Respiratory: no wheezing or shortness of breath at rest or exertion  Gastrointestinal: no abdominal pain, no constipation, no melena, no nausea, no diarrhea, no vomiting and no blood in stools.   Musculoskeletal: no arthralgias, no myalgias, no back pain, no joint swelling, no joint stiffness, no limb pain and no limb swelling.   Integumentary: no skin rashes, no skin lesions, no itching, no skin wound and no dry skin.   Neurological: no headache, no confusion, no numbness, no dizziness, no tingling and no fainting.   All other systems have been reviewed and are negative for complaint.       Vitals:    05/20/24 1525   BP: 130/70   Pulse: 66   Resp: 16   Temp: 36.9 °C (98.5 °F)          Physical Exam     Constitutional   General appearance: Alert and in no acute distress.     Pulmonary   Respiratory assessment: No respiratory distress, normal respiratory rhythm and effort.    Auscultation of Lungs: crackls  Cardiovascular   Auscultation of heart:  Apical pulse normal, heart rate and rhythm normal, normal S1 and S2, PABLO and no pericardial rub.    Exam for edema: no edema  Abdomen   Abdominal Exam: No bruits, normal bowel sounds, soft, non-tender, no abdominal mass palpated.    Liver and Spleen exam: No hepato-splenomegaly.   Musculoskeletal   Examination of gait: Normal.    Inspection of digits and nails: No clubbing or cyanosis of the fingernails.    Inspection/palpation of joints, bones and muscles: No joint swelling. Normal movement of all extremities.   Skin   Skin inspection: Normal skin color and pigmentation, normal skin turgor and no visible rash.   Neurologic   Cranial nerves: Nerves 2-12 were intact, no focal neuro defects.     Assessment/Plan          Patient with a past medical history of HTN,  HLD, ESRD on hemodialysis (T, Th, Sat), HFrEF,  PAD, GERD,  Anemia of Chronic Disease, history of Breast CA status postlumpectomy s/p displaced fractures of left superior and inferior pubic rami after a fall, Pseudogout, Osteoarthritis    # Blood loss anemia  # GERD  Continue PPI    # COPD  Night tie cough  Not using Spiriva  Re start  CT Chest    # HTN  Stable  Off Amlodipine and lasix  Continue current medications    # HLD  Stable  Continue current medications    # ESRD  On HD    # HFeEF  # Pedal edema  Stable  Off of lasix now    # 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

## 2024-08-26 ENCOUNTER — HOSPITAL ENCOUNTER (OUTPATIENT)
Dept: RADIOLOGY | Facility: HOSPITAL | Age: 84
Discharge: HOME | End: 2024-08-26
Payer: COMMERCIAL

## 2024-08-26 ENCOUNTER — APPOINTMENT (OUTPATIENT)
Dept: PRIMARY CARE | Facility: CLINIC | Age: 84
End: 2024-08-26
Payer: COMMERCIAL

## 2024-08-26 VITALS
BODY MASS INDEX: 19.53 KG/M2 | DIASTOLIC BLOOD PRESSURE: 70 MMHG | WEIGHT: 121 LBS | HEART RATE: 72 BPM | RESPIRATION RATE: 18 BRPM | TEMPERATURE: 98.1 F | SYSTOLIC BLOOD PRESSURE: 130 MMHG

## 2024-08-26 DIAGNOSIS — R05.8 CHESTY COUGH: ICD-10-CM

## 2024-08-26 DIAGNOSIS — I10 ESSENTIAL HYPERTENSION: Chronic | ICD-10-CM

## 2024-08-26 DIAGNOSIS — E78.2 MIXED HYPERLIPIDEMIA: Primary | Chronic | ICD-10-CM

## 2024-08-26 DIAGNOSIS — N18.6 ESRD (END STAGE RENAL DISEASE) ON DIALYSIS (MULTI): Chronic | ICD-10-CM

## 2024-08-26 DIAGNOSIS — Z99.2 ESRD (END STAGE RENAL DISEASE) ON DIALYSIS (MULTI): Chronic | ICD-10-CM

## 2024-08-26 DIAGNOSIS — E43 UNSPECIFIED SEVERE PROTEIN-CALORIE MALNUTRITION (MULTI): ICD-10-CM

## 2024-08-26 DIAGNOSIS — I50.43 ACUTE ON CHRONIC COMBINED SYSTOLIC (CONGESTIVE) AND DIASTOLIC (CONGESTIVE) HEART FAILURE (MULTI): ICD-10-CM

## 2024-08-26 DIAGNOSIS — J44.9 CHRONIC OBSTRUCTIVE PULMONARY DISEASE, UNSPECIFIED COPD TYPE (MULTI): ICD-10-CM

## 2024-08-26 PROBLEM — D69.6 THROMBOCYTOPENIA, UNSPECIFIED (CMS-HCC): Status: RESOLVED | Noted: 2024-08-26 | Resolved: 2024-08-26

## 2024-08-26 PROBLEM — M46.20 OSTEOMYELITIS OF SPINE (MULTI): Status: RESOLVED | Noted: 2020-09-02 | Resolved: 2024-08-26

## 2024-08-26 PROBLEM — J81.0 ACUTE PULMONARY EDEMA (MULTI): Status: RESOLVED | Noted: 2024-01-30 | Resolved: 2024-08-26

## 2024-08-26 PROBLEM — D61.818 OTHER PANCYTOPENIA (MULTI): Status: RESOLVED | Noted: 2024-08-26 | Resolved: 2024-08-26

## 2024-08-26 PROBLEM — D61.818 OTHER PANCYTOPENIA (MULTI): Status: ACTIVE | Noted: 2024-08-26

## 2024-08-26 PROBLEM — D69.6 THROMBOCYTOPENIA, UNSPECIFIED (CMS-HCC): Status: ACTIVE | Noted: 2024-08-26

## 2024-08-26 PROBLEM — I20.9 ANGINA PECTORIS (CMS-HCC): Status: RESOLVED | Noted: 2023-03-14 | Resolved: 2024-08-26

## 2024-08-26 PROCEDURE — 1036F TOBACCO NON-USER: CPT | Performed by: INTERNAL MEDICINE

## 2024-08-26 PROCEDURE — 3078F DIAST BP <80 MM HG: CPT | Performed by: INTERNAL MEDICINE

## 2024-08-26 PROCEDURE — 3075F SYST BP GE 130 - 139MM HG: CPT | Performed by: INTERNAL MEDICINE

## 2024-08-26 PROCEDURE — 1157F ADVNC CARE PLAN IN RCRD: CPT | Performed by: INTERNAL MEDICINE

## 2024-08-26 PROCEDURE — 1159F MED LIST DOCD IN RCRD: CPT | Performed by: INTERNAL MEDICINE

## 2024-08-26 PROCEDURE — 71046 X-RAY EXAM CHEST 2 VIEWS: CPT

## 2024-08-26 PROCEDURE — 99214 OFFICE O/P EST MOD 30 MIN: CPT | Performed by: INTERNAL MEDICINE

## 2024-08-26 PROCEDURE — 1160F RVW MEDS BY RX/DR IN RCRD: CPT | Performed by: INTERNAL MEDICINE

## 2024-08-26 PROCEDURE — 71046 X-RAY EXAM CHEST 2 VIEWS: CPT | Performed by: STUDENT IN AN ORGANIZED HEALTH CARE EDUCATION/TRAINING PROGRAM

## 2024-08-26 RX ORDER — ALBUTEROL SULFATE 90 UG/1
2 INHALANT RESPIRATORY (INHALATION) EVERY 4 HOURS PRN
Qty: 8.5 G | Refills: 5 | Status: SHIPPED | OUTPATIENT
Start: 2024-08-26 | End: 2025-08-26

## 2024-08-26 RX ORDER — TIOTROPIUM BROMIDE 18 UG/1
1 CAPSULE ORAL; RESPIRATORY (INHALATION) NIGHTLY
Qty: 30 CAPSULE | Refills: 11 | Status: SHIPPED | OUTPATIENT
Start: 2024-08-26 | End: 2025-08-26

## 2024-08-26 NOTE — PROGRESS NOTES
Alyce Dougherty is a 84 y.o. female   Patient with a past medical history of HTN,  HLD, ESRD on hemodialysis (T, Th, Sat), HFrEF,  PAD, GERD,  Anemia of Chronic Disease, history of Breast CA status postlumpectomy s/p displaced fractures of left superior and inferior pubic rami after a fall, Pseudogout, Osteoarthritis    Has a chronic cough  Mostly dry  Mostly in AM and at night  No shortness of breath             Review of Systems     Constitutional: no fever, no chills, not feeling poorly, not feeling tired and no recent weight gain, no recent weight loss.   ENT: no earache, no hearing loss, no nosebleeds, no nasal discharge, no sore throat and no hoarseness.   Cardiovascular: the heart rate was not slow, the heart rate was not fast, no chest pain, no palpitations, no intermittent leg claudication and no lower extremity edema.   Respiratory: chronic cough  Gastrointestinal: no abdominal pain, no constipation, no melena, no nausea, no diarrhea, no vomiting and no blood in stools.   Musculoskeletal: no arthralgias, no myalgias, no back pain, no joint swelling, no joint stiffness, no limb pain and no limb swelling.   Integumentary: no skin rashes, no skin lesions, no itching, no skin wound and no dry skin.   Neurological: no headache, no confusion, no numbness, no dizziness, no tingling and no fainting.   All other systems have been reviewed and are negative for complaint.     Current Outpatient Medications   Medication Instructions    aspirin 81 mg, oral, Daily    atorvastatin (LIPITOR) 80 mg, oral, Daily    carvedilol (Coreg) 6.25 mg tablet TAKE 1 TABLET(6.25 MG) BY MOUTH TWICE DAILY. DECREASED DOSE    gabapentin (NEURONTIN) 100 mg, oral, Nightly    isosorbide mononitrate ER (IMDUR) 60 mg, oral, Daily    magnesium oxide (Mag-Ox) 400 mg (241.3 mg magnesium) tablet 1 tablet, oral, Daily    melatonin 3 mg tablet 1 tablet, oral, Nightly PRN    mirtazapine (REMERON) 7.5 mg, oral, Nightly    pantoprazole (ProtoNix) 40 mg  EC tablet TAKE 1 TABLET BY MOUTH EVERY MORNING BEFORE EATING    sevelamer HCl (RENAGEL) 800 mg, oral, Daily, Before meals    tiotropium (SPIRIVA WITH HANDIHALER) 18 mcg, inhalation, Nightly         Vitals:    08/26/24 1516   BP: 130/70   Pulse: 72   Resp: 18   Temp: 36.7 °C (98.1 °F)        Physical Exam     Constitutional   General appearance: Alert and in no acute distress.     Pulmonary   Respiratory assessment: No respiratory distress, normal respiratory rhythm and effort.    Auscultation of Lungs: Clear bilateral breath sounds.   Cardiovascular   Auscultation of heart: Apical pulse normal, heart rate and rhythm normal, normal S1 and S2, systolic ejection murmur and no pericardial rub.    Exam for edema: No peripheral edema.   Abdomen   Abdominal Exam: No bruits, normal bowel sounds, soft, non-tender, no abdominal mass palpated.    Liver and Spleen exam: No hepato-splenomegaly.   Musculoskeletal   Examination of gait: Using a walker  Inspection of digits and nails: No clubbing or cyanosis of the fingernails.    Inspection/palpation of joints, bones and muscles: No joint swelling. Normal movement of all extremities.   Skin   Skin inspection: Normal skin color and pigmentation, normal skin turgor and no visible rash.   Neurologic   Cranial nerves: Nerves 2-12 were intact, no focal neuro defects.    Assessment/Plan          Patient with a past medical history of HTN,  HLD, ESRD on hemodialysis (T, Th, Sat), HFrEF,  PAD, GERD,  Anemia of Chronic Disease, history of Breast CA status postlumpectomy s/p displaced fractures of left superior and inferior pubic rami after a fall, Pseudogout, Osteoarthritis     # Blood loss anemia  stable  # GERD  Continue PPI     # COPD  Restart Spiriva  Albuterol PRN  Chest xray     # HTN  Stable  Off Amlodipine and lasix  Continue current medications     # HLD  Stable  Continue current medications     # ESRD  On HD  Messaged Dr Deal about the possibility of not taking enough fluid  off     # HFeEF  # Pedal edema  Stable  Off of lasix now

## 2024-10-01 DIAGNOSIS — E78.49 OTHER HYPERLIPIDEMIA: ICD-10-CM

## 2024-10-01 DIAGNOSIS — I10 ESSENTIAL HYPERTENSION: Chronic | ICD-10-CM

## 2024-10-01 DIAGNOSIS — R63.0 POOR APPETITE: ICD-10-CM

## 2024-10-01 DIAGNOSIS — I10 PRIMARY HYPERTENSION: Primary | ICD-10-CM

## 2024-10-01 DIAGNOSIS — G89.29 CHRONIC LOW BACK PAIN WITH SCIATICA, SCIATICA LATERALITY UNSPECIFIED, UNSPECIFIED BACK PAIN LATERALITY: ICD-10-CM

## 2024-10-01 DIAGNOSIS — M54.40 CHRONIC LOW BACK PAIN WITH SCIATICA, SCIATICA LATERALITY UNSPECIFIED, UNSPECIFIED BACK PAIN LATERALITY: ICD-10-CM

## 2024-10-01 RX ORDER — CARVEDILOL 6.25 MG/1
12.5 TABLET ORAL 2 TIMES DAILY
Qty: 180 TABLET | Refills: 0 | Status: SHIPPED | OUTPATIENT
Start: 2024-10-01

## 2024-10-10 DIAGNOSIS — I10 PRIMARY HYPERTENSION: ICD-10-CM

## 2024-10-10 RX ORDER — ATORVASTATIN CALCIUM 80 MG/1
80 TABLET, FILM COATED ORAL DAILY
Qty: 90 TABLET | Refills: 2 | Status: SHIPPED | OUTPATIENT
Start: 2024-10-10 | End: 2025-07-07

## 2024-10-10 RX ORDER — CARVEDILOL 6.25 MG/1
TABLET ORAL
Qty: 360 TABLET | Refills: 2 | Status: SHIPPED | OUTPATIENT
Start: 2024-10-10

## 2024-10-10 RX ORDER — GABAPENTIN 100 MG/1
100 CAPSULE ORAL 3 TIMES DAILY
Qty: 90 CAPSULE | Refills: 3 | Status: SHIPPED | OUTPATIENT
Start: 2024-10-10 | End: 2025-04-08

## 2024-10-10 RX ORDER — MIRTAZAPINE 7.5 MG/1
7.5 TABLET, FILM COATED ORAL NIGHTLY
Qty: 30 TABLET | Refills: 2 | Status: SHIPPED | OUTPATIENT
Start: 2024-10-10 | End: 2025-01-08

## 2024-10-10 RX ORDER — ISOSORBIDE MONONITRATE 30 MG/1
30 TABLET, EXTENDED RELEASE ORAL DAILY
Qty: 30 TABLET | Refills: 3 | Status: SHIPPED | OUTPATIENT
Start: 2024-10-10 | End: 2025-10-10

## 2024-10-15 DIAGNOSIS — R63.0 POOR APPETITE: ICD-10-CM

## 2024-10-15 RX ORDER — MIRTAZAPINE 7.5 MG/1
TABLET, FILM COATED ORAL
Qty: 90 TABLET | Refills: 1 | Status: SHIPPED | OUTPATIENT
Start: 2024-10-15

## 2024-10-15 RX ORDER — ISOSORBIDE MONONITRATE 30 MG/1
TABLET, EXTENDED RELEASE ORAL
Qty: 90 TABLET | Refills: 1 | Status: SHIPPED | OUTPATIENT
Start: 2024-10-15

## 2024-12-02 ENCOUNTER — APPOINTMENT (OUTPATIENT)
Dept: PRIMARY CARE | Facility: CLINIC | Age: 84
End: 2024-12-02
Payer: COMMERCIAL

## 2024-12-19 ENCOUNTER — APPOINTMENT (OUTPATIENT)
Dept: PRIMARY CARE | Facility: CLINIC | Age: 84
End: 2024-12-19
Payer: COMMERCIAL

## 2025-01-01 DIAGNOSIS — K92.2 GI BLEED: ICD-10-CM

## 2025-01-01 DIAGNOSIS — N18.6 ESRD (END STAGE RENAL DISEASE) ON DIALYSIS (MULTI): ICD-10-CM

## 2025-01-01 DIAGNOSIS — R63.0 POOR APPETITE: ICD-10-CM

## 2025-01-01 DIAGNOSIS — I10 ESSENTIAL HYPERTENSION: Chronic | ICD-10-CM

## 2025-01-01 DIAGNOSIS — Z99.2 ESRD (END STAGE RENAL DISEASE) ON DIALYSIS (MULTI): ICD-10-CM

## 2025-01-01 DIAGNOSIS — E78.49 OTHER HYPERLIPIDEMIA: ICD-10-CM

## 2025-01-01 RX ORDER — MIRTAZAPINE 7.5 MG/1
7.5 TABLET, FILM COATED ORAL NIGHTLY
Qty: 30 TABLET | Refills: 2 | Status: SHIPPED | OUTPATIENT
Start: 2025-01-01 | End: 2025-02-28

## 2025-01-01 RX ORDER — SEVELAMER HYDROCHLORIDE 800 MG/1
800 TABLET, FILM COATED ORAL DAILY
Qty: 30 TABLET | Refills: 3 | Status: SHIPPED | OUTPATIENT
Start: 2025-01-01 | End: 2025-02-28

## 2025-01-01 RX ORDER — ATORVASTATIN CALCIUM 80 MG/1
80 TABLET, FILM COATED ORAL DAILY
Qty: 90 TABLET | Refills: 2 | Status: SHIPPED | OUTPATIENT
Start: 2025-01-01 | End: 2025-02-28

## 2025-01-01 RX ORDER — CARVEDILOL 6.25 MG/1
6.25 TABLET ORAL 2 TIMES DAILY
Qty: 360 TABLET | Refills: 2 | Status: SHIPPED | OUTPATIENT
Start: 2025-01-01 | End: 2025-02-28

## 2025-01-01 RX ORDER — PANTOPRAZOLE SODIUM 40 MG/1
TABLET, DELAYED RELEASE ORAL
Qty: 270 TABLET | Refills: 1 | Status: SHIPPED | OUTPATIENT
Start: 2025-01-01 | End: 2025-02-28

## 2025-01-27 ENCOUNTER — APPOINTMENT (OUTPATIENT)
Dept: RADIOLOGY | Facility: HOSPITAL | Age: 85
End: 2025-01-27
Payer: MEDICARE

## 2025-01-27 ENCOUNTER — HOSPITAL ENCOUNTER (EMERGENCY)
Facility: HOSPITAL | Age: 85
Discharge: HOME | End: 2025-01-27
Payer: MEDICARE

## 2025-01-27 VITALS
DIASTOLIC BLOOD PRESSURE: 78 MMHG | WEIGHT: 121 LBS | RESPIRATION RATE: 16 BRPM | OXYGEN SATURATION: 95 % | BODY MASS INDEX: 19.44 KG/M2 | TEMPERATURE: 97.8 F | HEART RATE: 86 BPM | SYSTOLIC BLOOD PRESSURE: 165 MMHG | HEIGHT: 66 IN

## 2025-01-27 DIAGNOSIS — J18.9 PNEUMONIA OF RIGHT LOWER LOBE DUE TO INFECTIOUS ORGANISM: Primary | ICD-10-CM

## 2025-01-27 LAB
FLUAV RNA RESP QL NAA+PROBE: NOT DETECTED
FLUBV RNA RESP QL NAA+PROBE: NOT DETECTED
RSV RNA RESP QL NAA+PROBE: NOT DETECTED
SARS-COV-2 RNA RESP QL NAA+PROBE: NOT DETECTED

## 2025-01-27 PROCEDURE — 87205 SMEAR GRAM STAIN: CPT | Mod: AHULAB | Performed by: PHYSICIAN ASSISTANT

## 2025-01-27 PROCEDURE — 71046 X-RAY EXAM CHEST 2 VIEWS: CPT | Performed by: RADIOLOGY

## 2025-01-27 PROCEDURE — 99284 EMERGENCY DEPT VISIT MOD MDM: CPT | Mod: 25

## 2025-01-27 PROCEDURE — 87637 SARSCOV2&INF A&B&RSV AMP PRB: CPT | Performed by: PHYSICIAN ASSISTANT

## 2025-01-27 PROCEDURE — 71046 X-RAY EXAM CHEST 2 VIEWS: CPT

## 2025-01-27 PROCEDURE — 2500000001 HC RX 250 WO HCPCS SELF ADMINISTERED DRUGS (ALT 637 FOR MEDICARE OP): Performed by: PHYSICIAN ASSISTANT

## 2025-01-27 RX ORDER — AMOXICILLIN 500 MG/1
500 CAPSULE ORAL EVERY 12 HOURS SCHEDULED
Qty: 14 CAPSULE | Refills: 0 | Status: ON HOLD | OUTPATIENT
Start: 2025-01-27 | End: 2025-02-03

## 2025-01-27 RX ORDER — DOXYCYCLINE 100 MG/1
100 TABLET ORAL 2 TIMES DAILY
Qty: 10 TABLET | Refills: 0 | Status: ON HOLD | OUTPATIENT
Start: 2025-01-27 | End: 2025-02-01

## 2025-01-27 RX ORDER — GUAIFENESIN 100 MG/5ML
200 SOLUTION ORAL ONCE
Status: COMPLETED | OUTPATIENT
Start: 2025-01-27 | End: 2025-01-27

## 2025-01-27 RX ORDER — AMOXICILLIN 500 MG/1
1000 CAPSULE ORAL ONCE
Status: COMPLETED | OUTPATIENT
Start: 2025-01-27 | End: 2025-01-27

## 2025-01-27 RX ORDER — AMOXICILLIN 500 MG/1
1000 CAPSULE ORAL 3 TIMES DAILY
Qty: 30 CAPSULE | Refills: 0 | Status: SHIPPED | OUTPATIENT
Start: 2025-01-27 | End: 2025-01-27

## 2025-01-27 RX ORDER — BENZONATATE 100 MG/1
100 CAPSULE ORAL 3 TIMES DAILY PRN
Qty: 15 CAPSULE | Refills: 0 | Status: ON HOLD | OUTPATIENT
Start: 2025-01-27

## 2025-01-27 RX ORDER — BENZONATATE 100 MG/1
100 CAPSULE ORAL ONCE
Status: COMPLETED | OUTPATIENT
Start: 2025-01-27 | End: 2025-01-27

## 2025-01-27 RX ORDER — DOXYCYCLINE HYCLATE 100 MG
100 TABLET ORAL ONCE
Status: COMPLETED | OUTPATIENT
Start: 2025-01-27 | End: 2025-01-27

## 2025-01-27 RX ADMIN — AMOXICILLIN 1000 MG: 500 CAPSULE ORAL at 15:46

## 2025-01-27 RX ADMIN — DOXYCYCLINE HYCLATE 100 MG: 100 TABLET, COATED ORAL at 15:45

## 2025-01-27 RX ADMIN — BENZONATATE 100 MG: 100 CAPSULE ORAL at 15:13

## 2025-01-27 RX ADMIN — GUAIFENESIN 200 MG: 200 SOLUTION ORAL at 15:13

## 2025-01-27 ASSESSMENT — PAIN SCALES - GENERAL: PAINLEVEL_OUTOF10: 0 - NO PAIN

## 2025-01-27 ASSESSMENT — COLUMBIA-SUICIDE SEVERITY RATING SCALE - C-SSRS
1. IN THE PAST MONTH, HAVE YOU WISHED YOU WERE DEAD OR WISHED YOU COULD GO TO SLEEP AND NOT WAKE UP?: NO
6. HAVE YOU EVER DONE ANYTHING, STARTED TO DO ANYTHING, OR PREPARED TO DO ANYTHING TO END YOUR LIFE?: NO
2. HAVE YOU ACTUALLY HAD ANY THOUGHTS OF KILLING YOURSELF?: NO

## 2025-01-27 ASSESSMENT — PAIN - FUNCTIONAL ASSESSMENT: PAIN_FUNCTIONAL_ASSESSMENT: 0-10

## 2025-01-27 ASSESSMENT — PAIN DESCRIPTION - PROGRESSION: CLINICAL_PROGRESSION: NOT CHANGED

## 2025-01-27 NOTE — ED PROVIDER NOTES
"Chief Complaint   Patient presents with    Cough    Flu Symptoms     HPI:   Alyce Dougherty is an 84 y.o. female with complicated PMH including HTN, HLD, ESRD (HD T/Th/Sat), GERD, breast cancer, HFrEF who presents to the ED with family today for evaluation of cough.  Patient reports that she has had a cough for at least 3 to 4 weeks.  She endorses creamy yellow sputum.  Cough is worse in the morning.  Endorses associated rhinorrhea and decreased appetite with increased fatigue.  Denies any chest pain, shortness of breath, palpitations, hemoptysis, abdominal pain, nausea, vomiting, diarrhea, headaches, sore throat.  Denies sick contacts.    Medications: \"There in my chart\"  Soc HX: Former smoker  Allergies   Allergen Reactions    Lisinopril Angioedema    Hydralazine Unknown, Itching and Other    Metoprolol Itching    Hydrochlorothiazide Itching, Rash and Unknown     Feels too tired and voids to often on this medication and refuses to use.   :  Past Medical History:   Diagnosis Date    Chronic combined systolic and diastolic congestive heart failure 01/31/2024    -ECHO 8/1/2022:  The left ventricular systolic function is moderately to severely decreased, with an estimated ejection fraction of 30-35%. There is global hypokinesis of the left ventricle with minor regional variations. The left ventricular cavity size is normal. Spectral Doppler shows a pseudonormal pattern of left ventricular diastolic filling.     Chronic systolic (congestive) heart failure 06/26/2022    End stage renal disease (Multi) 06/26/2022    Essential hypertension 03/14/2023    Hyperlipidemia 03/14/2023    Metabolic encephalopathy 07/20/2022    PAD (peripheral artery disease) (CMS-[facility]) 03/14/2023     Past Surgical History:   Procedure Laterality Date    CT ABDOMEN PELVIS ANGIOGRAM W AND/OR WO IV CONTRAST  3/19/2023    CT ABDOMEN PELVIS ANGIOGRAM W AND/OR WO IV CONTRAST [facility]    CT ABDOMEN PELVIS ANGIOGRAM W AND/OR WO IV CONTRAST  6/7/2023    CT " ABDOMEN PELVIS ANGIOGRAM W AND/OR WO IV CONTRAST 6/7/2023 [facility]    OTHER SURGICAL HISTORY  10/03/2020    Arteriovenous graft fistula creation procedure    OTHER SURGICAL HISTORY  10/03/2020    Arteriovenous fistula creation procedure    OTHER SURGICAL HISTORY  10/03/2020    Lumpectomy    OTHER SURGICAL HISTORY  10/03/2020    Cataract surgery    OTHER SURGICAL HISTORY  10/03/2020    Tubal ligation bilateral    OTHER SURGICAL HISTORY  10/03/2020    Lung biopsy    OTHER SURGICAL HISTORY  10/03/2020    Mastectomy partial    OTHER SURGICAL HISTORY  10/03/2020    Tonsillectomy     Family History   Problem Relation Name Age of Onset    Other (CVA) Mother      Coronary artery disease Mother      Lymphoma Father      Breast cancer Sister      Breast cancer Daughter        Physical Exam  Vitals and nursing note reviewed.   Constitutional:       General: She is not in acute distress.     Appearance: She is not ill-appearing or toxic-appearing.      Comments: Appears younger than stated age   HENT:      Right Ear: External ear normal.      Left Ear: External ear normal.   Eyes:      Pupils: Pupils are equal, round, and reactive to light.   Cardiovascular:      Rate and Rhythm: Normal rate and regular rhythm.      Pulses: Normal pulses.      Heart sounds: Murmur heard.   Pulmonary:      Effort: Pulmonary effort is normal. No respiratory distress.      Comments: Sounds diminished bilateral bases  Abdominal:      General: Bowel sounds are normal.      Palpations: Abdomen is soft.      Tenderness: There is no abdominal tenderness. There is no guarding or rebound.   Musculoskeletal:         General: Normal range of motion.   Skin:     General: Skin is warm and dry.      Capillary Refill: Capillary refill takes less than 2 seconds.   Neurological:      Mental Status: She is alert.      Cranial Nerves: No cranial nerve deficit.     VS: As documented in the triage note and EMR flowsheet from this visit were reviewed.      Medical  Decision Making:   ED Course as of 01/27/25 1556   Mon Jan 27, 2025   1328 Vitals Reviewed: Afebrile. Hypertensive. Not tachycardic nor tachypneic. No hypoxia.  Patient is a pleasant 84-year-old female who presents to the ED for evaluation of cough x 3 to 4 weeks.  On exam breath sounds are diminished but I do not appreciate any adventitious lung sounds.  She is oxygenating well.  She is not tachypneic.  She appears younger than stated age.  She has no murmur.  Good cap refill.  Patient says she is just here to be evaluated for her cough.  She says she knows she has a lot of underlying health problems but the only thing bothering her is a cough and she does not want blood work completed today.  Given duration of cough, will obtain x-ray imaging to rule out pneumonia.  Obtain viral swabs.  Patient to be given Tessalon Perles and Robitussin [KA]   1534 FINDINGS:  Patchy right basilar consolidation. Cardiomegaly. Diffuse pulmonary  vascular congestion. No apparent pleural effusion. Aortic  atherosclerosis with tortuosity.       [KA]   1536 Patient will be initiated on dual therapy for CAP.  Will place on renally dosed amoxicillin 500 mg every 12.  Will initiate 7 days rather than 5 because she gets reduced dose. I chose doxycycline twice daily x 5 days rather than a Z-Michele because patient is on multiple medications that may cause QT prolongation because Doxy does not need to be renally dosed.  Patient to be given prescription for Tessalon Perles.  That does not require renal dosing either. [KA]   1541 Discussed results with patient.  Recommended that she follow-up with her primary care doctor in 2 to 5 days and that she obtain repeat imaging in 1 to 2 weeks to ensure resolution of infection.  Advised patient to return to ED for any new or worsening symptoms.  She is agreeable. [KA]      ED Course User Index  [KA] Fanta Rajput PA-C         Diagnoses as of 01/27/25 1556   Pneumonia of right lower lobe due to  infectious organism      Chronic Medical Conditions Significantly Affecting Care:      Escalation of Care: Appropriate for outpatient management      Prescription Drug Consideration: Patient is dialysis.  Medications prescribed using recommended renal dosing per up-to-date     Counseling: Spoke with the patient and discussed today´s findings, in addition to providing specific details for the plan of care and expected course.  Patient was given the opportunity to ask questions.    Discussed return precautions and importance of follow-up.  Advised to follow-up with PCP.  Advised to return to the ED for changing or worsening symptoms, new symptoms, complaint specific precautions, and precautions listed on the discharge paperwork.  Educated on the common potential side effects of medications prescribed.    I advised the patient that the emergency evaluation and treatment provided today doesn't end their need for medical care. It is very important that they follow-up with their primary care provider or other specialist as instructed.    The plan of care was mutually agreed upon with the patient. The patient and/or family were given the opportunity to ask questions. All questions asked today in the ED were answered to the best of my ability with today's information.    I specifically advised the patient to return to the ED for changing or worsening symptoms, worrisome new symptoms, or for any complaint specific precautions listed on the discharge paperwork.    This patient was cared for in the setting of nationwide stress on resources and staffing.    This report was transcribed using voice recognition software.  Every effort was made to ensure accuracy, however, inadvertently computerized transcription errors may be present.       Fanta Rajput PA-C  01/27/25 7807

## 2025-01-27 NOTE — DISCHARGE INSTRUCTIONS
Start taking your antibiotics tomorrow because you were given dose in the ER today.    Please begin taking doxycycline every 12 hours for the next 5 days.  Also begin amoxicillin take 500 mg every 12 hours x 7 days.  Complete full course of both antibiotics even if symptoms improve.  There should be no medication left over.  May take Tessalon Perles every 8 hours as needed for cough.  Follow-up with your primary care doctor and discuss obtaining a repeat x-ray in 1 to 2 weeks to ensure resolution of symptoms.  Return to ER for any new or worsening symptoms, especially fever while on antibiotics, coughing up blood, inability to tolerate oral intake or anything else concerning to you.

## 2025-01-28 LAB
BACTERIA SPEC RESP CULT: ABNORMAL
GRAM STN SPEC: ABNORMAL

## 2025-01-29 ENCOUNTER — TELEPHONE (OUTPATIENT)
Dept: PHARMACY | Facility: HOSPITAL | Age: 85
End: 2025-01-29
Payer: COMMERCIAL

## 2025-01-29 NOTE — PROGRESS NOTES
EDPD Note: Rapid Result Review    I reviewed Alyce Dougherty 's chart regarding a positive sputum culture/result that was taken during their recent emergency room visit. The patient was not told about these results prior to leaving the emergency department. Therefore, patient was contacted and given appropriate education.     Patient presented to ED due to pneumonia of the right lower lobe. She was treated empirically with amoxicillin and doxycycline. Assessed patient today for symptom improvement. She has been on abx for 2 days and has not had any improvement. Patient will be following up with pcp (Dr. Corado) and will get a repeat culture then.     No results found for the last 90 days.    Admission on 01/27/2025, Discharged on 01/27/2025   Component Date Value Ref Range Status    Coronavirus 2019, PCR 01/27/2025 Not Detected  Not Detected Final    Flu A Result 01/27/2025 Not Detected  Not Detected Final    Flu B Result 01/27/2025 Not Detected  Not Detected Final    RSV PCR 01/27/2025 Not Detected  Not Detected Final    Respiratory Culture/Smear 01/27/2025 Culture not performed. See Gram stain findings. Recollect if clinically indicated. (A)   Final    Gram Stain 01/27/2025 Gram stain indicates specimen contains significant salivary contamination. (A)   Final       No further follow up needed from EDPD Team.     If there are any other questions for the ED Post-Discharge Culture Follow Up Team, please contact [phone]. Fax: [phone].    Vaishnavi Fletcher PharmD

## 2025-02-04 ENCOUNTER — HOSPITAL ENCOUNTER (INPATIENT)
Facility: HOSPITAL | Age: 85
End: 2025-02-04
Attending: EMERGENCY MEDICINE | Admitting: INTERNAL MEDICINE
Payer: MEDICARE

## 2025-02-04 ENCOUNTER — APPOINTMENT (OUTPATIENT)
Dept: RADIOLOGY | Facility: HOSPITAL | Age: 85
DRG: 193 | End: 2025-02-04
Payer: MEDICARE

## 2025-02-04 ENCOUNTER — APPOINTMENT (OUTPATIENT)
Dept: CARDIOLOGY | Facility: HOSPITAL | Age: 85
DRG: 193 | End: 2025-02-04
Payer: MEDICARE

## 2025-02-04 DIAGNOSIS — J18.9 PNEUMONIA OF RIGHT LOWER LOBE DUE TO INFECTIOUS ORGANISM: Primary | ICD-10-CM

## 2025-02-04 DIAGNOSIS — R09.02 HYPOXEMIA: ICD-10-CM

## 2025-02-04 LAB
ALBUMIN SERPL BCP-MCNC: 3 G/DL (ref 3.4–5)
ALP SERPL-CCNC: 221 U/L (ref 33–136)
ALT SERPL W P-5'-P-CCNC: 3 U/L (ref 7–45)
ANION GAP BLDV CALCULATED.4IONS-SCNC: 10 MMOL/L (ref 10–25)
ANION GAP SERPL CALC-SCNC: 17 MMOL/L (ref 10–20)
APTT PPP: 46 SECONDS (ref 27–38)
AST SERPL W P-5'-P-CCNC: 17 U/L (ref 9–39)
ATRIAL RATE: 75 BPM
BASE EXCESS BLDV CALC-SCNC: 2.3 MMOL/L (ref -2–3)
BASOPHILS # BLD AUTO: 0.03 X10*3/UL (ref 0–0.1)
BASOPHILS NFR BLD AUTO: 0.6 %
BILIRUB SERPL-MCNC: 0.8 MG/DL (ref 0–1.2)
BODY TEMPERATURE: 37 DEGREES CELSIUS
BUN SERPL-MCNC: 42 MG/DL (ref 6–23)
CA-I BLDV-SCNC: 1.25 MMOL/L (ref 1.1–1.33)
CALCIUM SERPL-MCNC: 9.7 MG/DL (ref 8.6–10.3)
CARDIAC TROPONIN I PNL SERPL HS: 27 NG/L (ref 0–13)
CARDIAC TROPONIN I PNL SERPL HS: 30 NG/L (ref 0–13)
CHLORIDE BLDV-SCNC: 95 MMOL/L (ref 98–107)
CHLORIDE SERPL-SCNC: 95 MMOL/L (ref 98–107)
CO2 SERPL-SCNC: 25 MMOL/L (ref 21–32)
CREAT SERPL-MCNC: 8.47 MG/DL (ref 0.5–1.05)
EGFRCR SERPLBLD CKD-EPI 2021: 4 ML/MIN/1.73M*2
EOSINOPHIL # BLD AUTO: 0.15 X10*3/UL (ref 0–0.4)
EOSINOPHIL NFR BLD AUTO: 2.8 %
ERYTHROCYTE [DISTWIDTH] IN BLOOD BY AUTOMATED COUNT: 18.2 % (ref 11.5–14.5)
FLUAV RNA RESP QL NAA+PROBE: NOT DETECTED
FLUBV RNA RESP QL NAA+PROBE: NOT DETECTED
GLUCOSE BLD MANUAL STRIP-MCNC: 100 MG/DL (ref 74–99)
GLUCOSE BLDV-MCNC: 71 MG/DL (ref 74–99)
GLUCOSE SERPL-MCNC: 64 MG/DL (ref 74–99)
HCO3 BLDV-SCNC: 29.5 MMOL/L (ref 22–26)
HCT VFR BLD AUTO: 38.3 % (ref 36–46)
HCT VFR BLD EST: 41 % (ref 36–46)
HGB BLD-MCNC: 12.4 G/DL (ref 12–16)
HGB BLDV-MCNC: 13.7 G/DL (ref 12–16)
IMM GRANULOCYTES # BLD AUTO: 0.03 X10*3/UL (ref 0–0.5)
IMM GRANULOCYTES NFR BLD AUTO: 0.6 % (ref 0–0.9)
INHALED O2 CONCENTRATION: 21 %
INR PPP: 1.6 (ref 0.9–1.1)
LACTATE BLDV-SCNC: 1.3 MMOL/L (ref 0.4–2)
LACTATE SERPL-SCNC: 0.8 MMOL/L (ref 0.4–2)
LYMPHOCYTES # BLD AUTO: 0.76 X10*3/UL (ref 0.8–3)
LYMPHOCYTES NFR BLD AUTO: 13.9 %
MAGNESIUM SERPL-MCNC: 2.71 MG/DL (ref 1.6–2.4)
MCH RBC QN AUTO: 28.2 PG (ref 26–34)
MCHC RBC AUTO-ENTMCNC: 32.4 G/DL (ref 32–36)
MCV RBC AUTO: 87 FL (ref 80–100)
MONOCYTES # BLD AUTO: 0.51 X10*3/UL (ref 0.05–0.8)
MONOCYTES NFR BLD AUTO: 9.4 %
MRSA DNA SPEC QL NAA+PROBE: NOT DETECTED
NEUTROPHILS # BLD AUTO: 3.97 X10*3/UL (ref 1.6–5.5)
NEUTROPHILS NFR BLD AUTO: 72.7 %
NRBC BLD-RTO: 0 /100 WBCS (ref 0–0)
OXYHGB MFR BLDV: 48.6 % (ref 45–75)
P AXIS: 94 DEGREES
P OFFSET: 159 MS
P ONSET: 104 MS
PCO2 BLDV: 56 MM HG (ref 41–51)
PH BLDV: 7.33 PH (ref 7.33–7.43)
PLATELET # BLD AUTO: 155 X10*3/UL (ref 150–450)
PO2 BLDV: 31 MM HG (ref 35–45)
POTASSIUM BLDV-SCNC: 4 MMOL/L (ref 3.5–5.3)
POTASSIUM SERPL-SCNC: 4.1 MMOL/L (ref 3.5–5.3)
PR INTERVAL: 212 MS
PROT SERPL-MCNC: 7.2 G/DL (ref 6.4–8.2)
PROTHROMBIN TIME: 17.8 SECONDS (ref 9.8–12.8)
Q ONSET: 210 MS
QRS COUNT: 12 BEATS
QRS DURATION: 84 MS
QT INTERVAL: 392 MS
QTC CALCULATION(BAZETT): 437 MS
QTC FREDERICIA: 422 MS
R AXIS: 130 DEGREES
RBC # BLD AUTO: 4.39 X10*6/UL (ref 4–5.2)
RSV RNA RESP QL NAA+PROBE: NOT DETECTED
SAO2 % BLDV: 50 % (ref 45–75)
SARS-COV-2 RNA RESP QL NAA+PROBE: NOT DETECTED
SODIUM BLDV-SCNC: 130 MMOL/L (ref 136–145)
SODIUM SERPL-SCNC: 133 MMOL/L (ref 136–145)
T AXIS: -41 DEGREES
T OFFSET: 406 MS
VENTRICULAR RATE: 75 BPM
WBC # BLD AUTO: 5.5 X10*3/UL (ref 4.4–11.3)

## 2025-02-04 PROCEDURE — 93005 ELECTROCARDIOGRAM TRACING: CPT

## 2025-02-04 PROCEDURE — 1200000002 HC GENERAL ROOM WITH TELEMETRY DAILY

## 2025-02-04 PROCEDURE — 85730 THROMBOPLASTIN TIME PARTIAL: CPT | Performed by: EMERGENCY MEDICINE

## 2025-02-04 PROCEDURE — 99285 EMERGENCY DEPT VISIT HI MDM: CPT | Mod: 25 | Performed by: EMERGENCY MEDICINE

## 2025-02-04 PROCEDURE — 84484 ASSAY OF TROPONIN QUANT: CPT | Performed by: EMERGENCY MEDICINE

## 2025-02-04 PROCEDURE — 2500000004 HC RX 250 GENERAL PHARMACY W/ HCPCS (ALT 636 FOR OP/ED)

## 2025-02-04 PROCEDURE — 87637 SARSCOV2&INF A&B&RSV AMP PRB: CPT | Performed by: EMERGENCY MEDICINE

## 2025-02-04 PROCEDURE — 71046 X-RAY EXAM CHEST 2 VIEWS: CPT | Performed by: RADIOLOGY

## 2025-02-04 PROCEDURE — 85610 PROTHROMBIN TIME: CPT | Performed by: EMERGENCY MEDICINE

## 2025-02-04 PROCEDURE — 2500000004 HC RX 250 GENERAL PHARMACY W/ HCPCS (ALT 636 FOR OP/ED): Performed by: INTERNAL MEDICINE

## 2025-02-04 PROCEDURE — 36415 COLL VENOUS BLD VENIPUNCTURE: CPT | Performed by: EMERGENCY MEDICINE

## 2025-02-04 PROCEDURE — 71046 X-RAY EXAM CHEST 2 VIEWS: CPT

## 2025-02-04 PROCEDURE — 80053 COMPREHEN METABOLIC PANEL: CPT | Performed by: EMERGENCY MEDICINE

## 2025-02-04 PROCEDURE — 82947 ASSAY GLUCOSE BLOOD QUANT: CPT

## 2025-02-04 PROCEDURE — 84132 ASSAY OF SERUM POTASSIUM: CPT | Performed by: EMERGENCY MEDICINE

## 2025-02-04 PROCEDURE — 87641 MR-STAPH DNA AMP PROBE: CPT | Performed by: EMERGENCY MEDICINE

## 2025-02-04 PROCEDURE — 82810 BLOOD GASES O2 SAT ONLY: CPT | Performed by: EMERGENCY MEDICINE

## 2025-02-04 PROCEDURE — 83605 ASSAY OF LACTIC ACID: CPT | Performed by: EMERGENCY MEDICINE

## 2025-02-04 PROCEDURE — 2500000001 HC RX 250 WO HCPCS SELF ADMINISTERED DRUGS (ALT 637 FOR MEDICARE OP): Performed by: INTERNAL MEDICINE

## 2025-02-04 PROCEDURE — 96365 THER/PROPH/DIAG IV INF INIT: CPT

## 2025-02-04 PROCEDURE — 83735 ASSAY OF MAGNESIUM: CPT | Performed by: EMERGENCY MEDICINE

## 2025-02-04 PROCEDURE — 2500000004 HC RX 250 GENERAL PHARMACY W/ HCPCS (ALT 636 FOR OP/ED): Performed by: EMERGENCY MEDICINE

## 2025-02-04 PROCEDURE — 82435 ASSAY OF BLOOD CHLORIDE: CPT | Performed by: EMERGENCY MEDICINE

## 2025-02-04 PROCEDURE — 87640 STAPH A DNA AMP PROBE: CPT | Performed by: EMERGENCY MEDICINE

## 2025-02-04 PROCEDURE — 85025 COMPLETE CBC W/AUTO DIFF WBC: CPT | Performed by: EMERGENCY MEDICINE

## 2025-02-04 PROCEDURE — 87040 BLOOD CULTURE FOR BACTERIA: CPT | Mod: AHULAB | Performed by: EMERGENCY MEDICINE

## 2025-02-04 RX ORDER — ISOSORBIDE MONONITRATE 30 MG/1
60 TABLET, EXTENDED RELEASE ORAL DAILY
Status: DISPENSED | OUTPATIENT
Start: 2025-02-05

## 2025-02-04 RX ORDER — ALBUTEROL SULFATE 90 UG/1
2 INHALANT RESPIRATORY (INHALATION) EVERY 4 HOURS PRN
Status: DISCONTINUED | OUTPATIENT
Start: 2025-02-04 | End: 2025-02-04

## 2025-02-04 RX ORDER — IPRATROPIUM BROMIDE AND ALBUTEROL SULFATE 2.5; .5 MG/3ML; MG/3ML
3 SOLUTION RESPIRATORY (INHALATION)
Status: DISCONTINUED | OUTPATIENT
Start: 2025-02-05 | End: 2025-02-04

## 2025-02-04 RX ORDER — ASPIRIN 81 MG/1
81 TABLET ORAL DAILY
Status: DISPENSED | OUTPATIENT
Start: 2025-02-05

## 2025-02-04 RX ORDER — BENZONATATE 100 MG/1
100 CAPSULE ORAL 3 TIMES DAILY PRN
Status: DISPENSED | OUTPATIENT
Start: 2025-02-04

## 2025-02-04 RX ORDER — ISOSORBIDE MONONITRATE 30 MG/1
30 TABLET, EXTENDED RELEASE ORAL DAILY
Status: DISCONTINUED | OUTPATIENT
Start: 2025-02-05 | End: 2025-02-05 | Stop reason: ALTCHOICE

## 2025-02-04 RX ORDER — MIRTAZAPINE 15 MG/1
7.5 TABLET, FILM COATED ORAL NIGHTLY
Status: DISPENSED | OUTPATIENT
Start: 2025-02-04

## 2025-02-04 RX ORDER — VANCOMYCIN HYDROCHLORIDE 1 G/200ML
1000 INJECTION, SOLUTION INTRAVENOUS ONCE
Status: COMPLETED | OUTPATIENT
Start: 2025-02-04 | End: 2025-02-04

## 2025-02-04 RX ORDER — IPRATROPIUM BROMIDE AND ALBUTEROL SULFATE 2.5; .5 MG/3ML; MG/3ML
3 SOLUTION RESPIRATORY (INHALATION)
Status: DISPENSED | OUTPATIENT
Start: 2025-02-05

## 2025-02-04 RX ORDER — GABAPENTIN 100 MG/1
100 CAPSULE ORAL 3 TIMES DAILY
Status: DISPENSED | OUTPATIENT
Start: 2025-02-04

## 2025-02-04 RX ORDER — ATORVASTATIN CALCIUM 80 MG/1
80 TABLET, FILM COATED ORAL DAILY
Status: DISPENSED | OUTPATIENT
Start: 2025-02-05

## 2025-02-04 RX ORDER — CARVEDILOL 6.25 MG/1
6.25 TABLET ORAL 2 TIMES DAILY
Status: DISPENSED | OUTPATIENT
Start: 2025-02-04

## 2025-02-04 RX ORDER — IPRATROPIUM BROMIDE 0.5 MG/2.5ML
0.5 SOLUTION RESPIRATORY (INHALATION)
Status: ACTIVE | OUTPATIENT
Start: 2025-02-05

## 2025-02-04 RX ORDER — IPRATROPIUM BROMIDE AND ALBUTEROL SULFATE 2.5; .5 MG/3ML; MG/3ML
3 SOLUTION RESPIRATORY (INHALATION) EVERY 2 HOUR PRN
Status: ACTIVE | OUTPATIENT
Start: 2025-02-04

## 2025-02-04 RX ORDER — DEXTROSE 50 % IN WATER (D50W) INTRAVENOUS SYRINGE
25 ONCE
Status: ACTIVE | OUTPATIENT
Start: 2025-02-04

## 2025-02-04 RX ORDER — SEVELAMER CARBONATE 800 MG/1
800 TABLET, FILM COATED ORAL
Status: DISPENSED | OUTPATIENT
Start: 2025-02-05

## 2025-02-04 RX ORDER — HEPARIN SODIUM 5000 [USP'U]/ML
5000 INJECTION, SOLUTION INTRAVENOUS; SUBCUTANEOUS EVERY 8 HOURS SCHEDULED
Status: DISCONTINUED | OUTPATIENT
Start: 2025-02-04 | End: 2025-02-05

## 2025-02-04 RX ORDER — LANOLIN ALCOHOL/MO/W.PET/CERES
400 CREAM (GRAM) TOPICAL DAILY
Status: ACTIVE | OUTPATIENT
Start: 2025-02-05

## 2025-02-04 RX ADMIN — VANCOMYCIN HYDROCHLORIDE 1000 MG: 1 INJECTION, SOLUTION INTRAVENOUS at 20:45

## 2025-02-04 RX ADMIN — CARVEDILOL 6.25 MG: 6.25 TABLET, FILM COATED ORAL at 22:29

## 2025-02-04 RX ADMIN — GABAPENTIN 100 MG: 100 CAPSULE ORAL at 22:29

## 2025-02-04 RX ADMIN — BENZONATATE 100 MG: 100 CAPSULE ORAL at 22:42

## 2025-02-04 RX ADMIN — AZITHROMYCIN 500 MG: 500 INJECTION, POWDER, LYOPHILIZED, FOR SOLUTION INTRAVENOUS at 19:36

## 2025-02-04 RX ADMIN — HEPARIN SODIUM 5000 UNITS: 5000 INJECTION, SOLUTION INTRAVENOUS; SUBCUTANEOUS at 22:29

## 2025-02-04 RX ADMIN — PIPERACILLIN SODIUM AND TAZOBACTAM SODIUM 2.25 G: 2; .25 INJECTION, SOLUTION INTRAVENOUS at 19:09

## 2025-02-04 RX ADMIN — MIRTAZAPINE 7.5 MG: 15 TABLET, FILM COATED ORAL at 22:29

## 2025-02-04 SDOH — ECONOMIC STABILITY: FOOD INSECURITY: WITHIN THE PAST 12 MONTHS, YOU WORRIED THAT YOUR FOOD WOULD RUN OUT BEFORE YOU GOT THE MONEY TO BUY MORE.: NEVER TRUE

## 2025-02-04 SDOH — SOCIAL STABILITY: SOCIAL INSECURITY
WITHIN THE LAST YEAR, HAVE YOU BEEN KICKED, HIT, SLAPPED, OR OTHERWISE PHYSICALLY HURT BY YOUR PARTNER OR EX-PARTNER?: NO

## 2025-02-04 SDOH — SOCIAL STABILITY: SOCIAL INSECURITY: ABUSE: ADULT

## 2025-02-04 SDOH — SOCIAL STABILITY: SOCIAL INSECURITY: WITHIN THE LAST YEAR, HAVE YOU BEEN AFRAID OF YOUR PARTNER OR EX-PARTNER?: NO

## 2025-02-04 SDOH — HEALTH STABILITY: MENTAL HEALTH: HOW OFTEN DO YOU HAVE SIX OR MORE DRINKS ON ONE OCCASION?: NEVER

## 2025-02-04 SDOH — SOCIAL STABILITY: SOCIAL INSECURITY: WERE YOU ABLE TO COMPLETE ALL THE BEHAVIORAL HEALTH SCREENINGS?: YES

## 2025-02-04 SDOH — SOCIAL STABILITY: SOCIAL INSECURITY: WITHIN THE LAST YEAR, HAVE YOU BEEN HUMILIATED OR EMOTIONALLY ABUSED IN OTHER WAYS BY YOUR PARTNER OR EX-PARTNER?: NO

## 2025-02-04 SDOH — ECONOMIC STABILITY: FOOD INSECURITY: WITHIN THE PAST 12 MONTHS, THE FOOD YOU BOUGHT JUST DIDN'T LAST AND YOU DIDN'T HAVE MONEY TO GET MORE.: NEVER TRUE

## 2025-02-04 SDOH — SOCIAL STABILITY: SOCIAL INSECURITY: DOES ANYONE TRY TO KEEP YOU FROM HAVING/CONTACTING OTHER FRIENDS OR DOING THINGS OUTSIDE YOUR HOME?: NO

## 2025-02-04 SDOH — HEALTH STABILITY: MENTAL HEALTH: HOW OFTEN DO YOU HAVE A DRINK CONTAINING ALCOHOL?: NEVER

## 2025-02-04 SDOH — SOCIAL STABILITY: SOCIAL INSECURITY: DO YOU FEEL ANYONE HAS EXPLOITED OR TAKEN ADVANTAGE OF YOU FINANCIALLY OR OF YOUR PERSONAL PROPERTY?: NO

## 2025-02-04 SDOH — HEALTH STABILITY: MENTAL HEALTH: HOW MANY DRINKS CONTAINING ALCOHOL DO YOU HAVE ON A TYPICAL DAY WHEN YOU ARE DRINKING?: PATIENT DOES NOT DRINK

## 2025-02-04 SDOH — SOCIAL STABILITY: SOCIAL INSECURITY: ARE THERE ANY APPARENT SIGNS OF INJURIES/BEHAVIORS THAT COULD BE RELATED TO ABUSE/NEGLECT?: NO

## 2025-02-04 SDOH — ECONOMIC STABILITY: INCOME INSECURITY: IN THE PAST 12 MONTHS HAS THE ELECTRIC, GAS, OIL, OR WATER COMPANY THREATENED TO SHUT OFF SERVICES IN YOUR HOME?: NO

## 2025-02-04 SDOH — SOCIAL STABILITY: SOCIAL INSECURITY: ARE YOU OR HAVE YOU BEEN THREATENED OR ABUSED PHYSICALLY, EMOTIONALLY, OR SEXUALLY BY ANYONE?: NO

## 2025-02-04 SDOH — SOCIAL STABILITY: SOCIAL INSECURITY: HAVE YOU HAD THOUGHTS OF HARMING ANYONE ELSE?: NO

## 2025-02-04 SDOH — SOCIAL STABILITY: SOCIAL INSECURITY
WITHIN THE LAST YEAR, HAVE YOU BEEN RAPED OR FORCED TO HAVE ANY KIND OF SEXUAL ACTIVITY BY YOUR PARTNER OR EX-PARTNER?: NO

## 2025-02-04 SDOH — SOCIAL STABILITY: SOCIAL INSECURITY: DO YOU FEEL UNSAFE GOING BACK TO THE PLACE WHERE YOU ARE LIVING?: NO

## 2025-02-04 SDOH — SOCIAL STABILITY: SOCIAL INSECURITY: HAS ANYONE EVER THREATENED TO HURT YOUR FAMILY OR YOUR PETS?: NO

## 2025-02-04 ASSESSMENT — COGNITIVE AND FUNCTIONAL STATUS - GENERAL
STANDING UP FROM CHAIR USING ARMS: A LOT
MOVING TO AND FROM BED TO CHAIR: A LOT
WALKING IN HOSPITAL ROOM: A LOT
HELP NEEDED FOR BATHING: A LITTLE
PERSONAL GROOMING: A LITTLE
TURNING FROM BACK TO SIDE WHILE IN FLAT BAD: A LOT
DRESSING REGULAR UPPER BODY CLOTHING: A LITTLE
MOBILITY SCORE: 12
PATIENT BASELINE BEDBOUND: NO
MOVING FROM LYING ON BACK TO SITTING ON SIDE OF FLAT BED WITH BEDRAILS: A LOT
CLIMB 3 TO 5 STEPS WITH RAILING: A LOT
TOILETING: A LITTLE
DRESSING REGULAR LOWER BODY CLOTHING: A LITTLE
EATING MEALS: A LITTLE
DAILY ACTIVITIY SCORE: 18

## 2025-02-04 ASSESSMENT — ACTIVITIES OF DAILY LIVING (ADL)
PATIENT'S MEMORY ADEQUATE TO SAFELY COMPLETE DAILY ACTIVITIES?: YES
WALKS IN HOME: INDEPENDENT
BATHING: INDEPENDENT
JUDGMENT_ADEQUATE_SAFELY_COMPLETE_DAILY_ACTIVITIES: YES
ADEQUATE_TO_COMPLETE_ADL: YES
DRESSING YOURSELF: INDEPENDENT
HEARING - LEFT EAR: DIFFICULTY WITH NOISE
GROOMING: INDEPENDENT
FEEDING YOURSELF: INDEPENDENT
LACK_OF_TRANSPORTATION: NO
ASSISTIVE_DEVICE: WALKER;CANE
HEARING - RIGHT EAR: DIFFICULTY WITH NOISE
TOILETING: INDEPENDENT

## 2025-02-04 ASSESSMENT — PAIN SCALES - GENERAL
PAINLEVEL_OUTOF10: 0 - NO PAIN
PAINLEVEL_OUTOF10: 0 - NO PAIN

## 2025-02-04 ASSESSMENT — PATIENT HEALTH QUESTIONNAIRE - PHQ9
SUM OF ALL RESPONSES TO PHQ9 QUESTIONS 1 & 2: 0
1. LITTLE INTEREST OR PLEASURE IN DOING THINGS: NOT AT ALL
2. FEELING DOWN, DEPRESSED OR HOPELESS: NOT AT ALL

## 2025-02-04 ASSESSMENT — LIFESTYLE VARIABLES
AUDIT-C TOTAL SCORE: 0
SKIP TO QUESTIONS 9-10: 1

## 2025-02-04 ASSESSMENT — PAIN - FUNCTIONAL ASSESSMENT: PAIN_FUNCTIONAL_ASSESSMENT: 0-10

## 2025-02-04 NOTE — ED PROVIDER NOTES
HPI   Chief Complaint   Patient presents with    Fatigue       The patient is an 84-year-old female past medical history of CHF, hyperlipidemia, end-stage renal disease on Monday/Wednesday/Friday dialysis, recently diagnosed with pneumonia treated with amoxicillin/doxycycline presenting with persistent cough, fatigue.  Patient endorses compliance with antibiotic therapy at home, notes that cough has become more productive over the last few days.  Denies any chest pain, extremity edema, notes mild dyspnea.  Notes that she did not attend her dialysis session today due to feeling unwell.  Notes diminished appetite over the last few days as well.              Patient History   Past Medical History:   Diagnosis Date    Chronic combined systolic and diastolic congestive heart failure 01/31/2024    -ECHO 8/1/2022:  The left ventricular systolic function is moderately to severely decreased, with an estimated ejection fraction of 30-35%. There is global hypokinesis of the left ventricle with minor regional variations. The left ventricular cavity size is normal. Spectral Doppler shows a pseudonormal pattern of left ventricular diastolic filling.     Chronic systolic (congestive) heart failure 06/26/2022    End stage renal disease (Multi) 06/26/2022    Essential hypertension 03/14/2023    Hyperlipidemia 03/14/2023    Metabolic encephalopathy 07/20/2022    PAD (peripheral artery disease) (CMS-[facility]) 03/14/2023     Past Surgical History:   Procedure Laterality Date    CT ABDOMEN PELVIS ANGIOGRAM W AND/OR WO IV CONTRAST  3/19/2023    CT ABDOMEN PELVIS ANGIOGRAM W AND/OR WO IV CONTRAST [facility] CT    CT ABDOMEN PELVIS ANGIOGRAM W AND/OR WO IV CONTRAST  6/7/2023    CT ABDOMEN PELVIS ANGIOGRAM W AND/OR WO IV CONTRAST 6/7/2023 [facility] CT    OTHER SURGICAL HISTORY  10/03/2020    Arteriovenous graft fistula creation procedure    OTHER SURGICAL HISTORY  10/03/2020    Arteriovenous fistula creation procedure    OTHER SURGICAL HISTORY  10/03/2020     Lumpectomy    OTHER SURGICAL HISTORY  10/03/2020    Cataract surgery    OTHER SURGICAL HISTORY  10/03/2020    Tubal ligation bilateral    OTHER SURGICAL HISTORY  10/03/2020    Lung biopsy    OTHER SURGICAL HISTORY  10/03/2020    Mastectomy partial    OTHER SURGICAL HISTORY  10/03/2020    Tonsillectomy     Family History   Problem Relation Name Age of Onset    Other (CVA) Mother      Coronary artery disease Mother      Lymphoma Father      Breast cancer Sister      Breast cancer Daughter       Social History     Tobacco Use    Smoking status: Former     Types: Cigarettes    Smokeless tobacco: Never   Vaping Use    Vaping status: Never Used   Substance Use Topics    Alcohol use: Not Currently    Drug use: Not Currently       Physical Exam   ED Triage Vitals [02/04/25 1214]   Temperature Heart Rate Respirations BP   37.4 °C (99.4 °F) 63 18 148/78      Pulse Ox Temp Source Heart Rate Source Patient Position   97 % Temporal Monitor Sitting      BP Location FiO2 (%)     Left arm --       Physical Exam  Vitals and nursing note reviewed.   Constitutional:       General: She is not in acute distress.     Appearance: Normal appearance. She is not ill-appearing or toxic-appearing.   HENT:      Head: Normocephalic and atraumatic.      Nose: No congestion or rhinorrhea.      Mouth/Throat:      Mouth: Mucous membranes are moist.      Pharynx: Oropharynx is clear. No oropharyngeal exudate or posterior oropharyngeal erythema.   Eyes:      Extraocular Movements: Extraocular movements intact.      Right eye: Normal extraocular motion.      Left eye: Normal extraocular motion.      Conjunctiva/sclera: Conjunctivae normal.      Pupils: Pupils are equal, round, and reactive to light.   Cardiovascular:      Rate and Rhythm: Normal rate and regular rhythm.      Pulses: Normal pulses.      Heart sounds: Normal heart sounds, S1 normal and S2 normal. No murmur heard.     No friction rub. No gallop.   Pulmonary:      Effort: Pulmonary effort  is normal. No respiratory distress.      Breath sounds: No stridor. Examination of the right-middle field reveals rhonchi. Examination of the right-lower field reveals rhonchi. Rhonchi present. No wheezing or rales.   Abdominal:      General: Abdomen is flat. Bowel sounds are normal. There is no distension.      Palpations: Abdomen is soft.      Tenderness: There is no abdominal tenderness. There is no right CVA tenderness, left CVA tenderness, guarding or rebound.   Musculoskeletal:      Cervical back: Full passive range of motion without pain.      Right lower leg: No edema.      Left lower leg: No edema.   Skin:     General: Skin is warm and dry.   Neurological:      General: No focal deficit present.      Mental Status: She is alert and oriented to person, place, and time.      GCS: GCS eye subscore is 4. GCS verbal subscore is 5. GCS motor subscore is 6.      Cranial Nerves: No cranial nerve deficit.      Sensory: No sensory deficit.      Motor: No weakness, tremor or abnormal muscle tone.   Psychiatric:         Mood and Affect: Mood normal.           ED Course & MDM   Diagnoses as of 02/05/25 1146   Pneumonia of right lower lobe due to infectious organism   Hypoxemia                 No data recorded     Delia Coma Scale Score: 15 (02/04/25 1735 : Renetta Sanches RN)                           Medical Decision Making  ECG interpreted by me: Sinus rhythm, rate 75, QRS 84, QTc 437.  No ST changes meeting STEMI criteria.  T wave inversions in lead III, aVF.    Patient presenting with cough, decreased appetite, generalized fatigue in the setting of recently diagnosed pneumonia, patient has not attended her dialysis appointment today due to this.  On examination patient is mildly hypoxemic to 90% on room air, improved with 2 L nasal cannula supplemental oxygen.  Appears to be in no acute respiratory distress.  Does have crackles at the right lung base, consistent with previous identified pneumonia in that region.   Concern for worsening pneumonia, fluid overload secondary to missed dialysis appointment.  Patient's labs are elevated creatinine but normal potassium, suspect secondary to missed dialysis.  Chest x-ray shows worsening right lower lobe infiltrate, therefore obtain blood cultures, started patient on IV Zosyn, vancomycin given concern for risk factors for resistant organisms given dialysis, poor response to doxycycline/amoxicillin.  Given new oxygen requirement and failure of oral antibiotics, patient will be admitted to medicine for further evaluation and management.  Admitted in stable condition.        Procedure  Procedures     Mark Snider MD  02/05/25 1150

## 2025-02-04 NOTE — ED TRIAGE NOTES
TRIAGE NOTE   I saw the patient as the Clinician in Triage and performed a brief history and physical exam, established acuity, and ordered appropriate tests to develop basic plan of care. Patient will be seen by an NAJMA, resident and/or physician who will independently evaluate the patient. Please see subsequent provider notes for further details and disposition.     Brief HPI: In brief, Alyce Dougherty is a 84 y.o. female that presents for weakness and fatigue in the setting of pneumonia.  Chest x-ray does show concern for pneumonia started antibiotics.  She did miss dialysis on Friday.  No nausea or vomiting.  No diarrhea.  No chest pain  Focused Physical exam:   Heart regular rate and rhythm  Lungs clear to muscle Tatian bilaterally  Abdomen soft nontender  Extremities no edema  Plan/MDM:   IV labs venous full panel EKG chest x-ray    Please see subsequent provider note for further details and disposition

## 2025-02-04 NOTE — ED TRIAGE NOTES
Pt was treated here for pneumonia. Pt states she is having fatigue and not eating. Pt missed her dialysis last week and yesterday.

## 2025-02-05 ENCOUNTER — APPOINTMENT (OUTPATIENT)
Dept: DIALYSIS | Facility: HOSPITAL | Age: 85
End: 2025-02-05
Payer: COMMERCIAL

## 2025-02-05 LAB
ANION GAP SERPL CALC-SCNC: 16 MMOL/L (ref 10–20)
BASOPHILS # BLD AUTO: 0.05 X10*3/UL (ref 0–0.1)
BASOPHILS NFR BLD AUTO: 0.9 %
BUN SERPL-MCNC: 47 MG/DL (ref 6–23)
CALCIUM SERPL-MCNC: 9.1 MG/DL (ref 8.6–10.3)
CHLORIDE SERPL-SCNC: 94 MMOL/L (ref 98–107)
CO2 SERPL-SCNC: 26 MMOL/L (ref 21–32)
CREAT SERPL-MCNC: 9.35 MG/DL (ref 0.5–1.05)
EGFRCR SERPLBLD CKD-EPI 2021: 4 ML/MIN/1.73M*2
EOSINOPHIL # BLD AUTO: 0.21 X10*3/UL (ref 0–0.4)
EOSINOPHIL NFR BLD AUTO: 3.9 %
ERYTHROCYTE [DISTWIDTH] IN BLOOD BY AUTOMATED COUNT: 17.8 % (ref 11.5–14.5)
GLUCOSE SERPL-MCNC: 113 MG/DL (ref 74–99)
HCT VFR BLD AUTO: 37.5 % (ref 36–46)
HGB BLD-MCNC: 12.1 G/DL (ref 12–16)
IMM GRANULOCYTES # BLD AUTO: 0.02 X10*3/UL (ref 0–0.5)
IMM GRANULOCYTES NFR BLD AUTO: 0.4 % (ref 0–0.9)
LYMPHOCYTES # BLD AUTO: 0.86 X10*3/UL (ref 0.8–3)
LYMPHOCYTES NFR BLD AUTO: 15.9 %
MAGNESIUM SERPL-MCNC: 2.63 MG/DL (ref 1.6–2.4)
MCH RBC QN AUTO: 28.1 PG (ref 26–34)
MCHC RBC AUTO-ENTMCNC: 32.3 G/DL (ref 32–36)
MCV RBC AUTO: 87 FL (ref 80–100)
MONOCYTES # BLD AUTO: 0.51 X10*3/UL (ref 0.05–0.8)
MONOCYTES NFR BLD AUTO: 9.4 %
NEUTROPHILS # BLD AUTO: 3.75 X10*3/UL (ref 1.6–5.5)
NEUTROPHILS NFR BLD AUTO: 69.5 %
NRBC BLD-RTO: 0 /100 WBCS (ref 0–0)
PLATELET # BLD AUTO: 143 X10*3/UL (ref 150–450)
POTASSIUM SERPL-SCNC: 3.9 MMOL/L (ref 3.5–5.3)
PROCALCITONIN SERPL-MCNC: 50.42 NG/ML
RBC # BLD AUTO: 4.31 X10*6/UL (ref 4–5.2)
SODIUM SERPL-SCNC: 132 MMOL/L (ref 136–145)
WBC # BLD AUTO: 5.4 X10*3/UL (ref 4.4–11.3)

## 2025-02-05 PROCEDURE — 8010000001 HC DIALYSIS - HEMODIALYSIS PER DAY

## 2025-02-05 PROCEDURE — 2500000002 HC RX 250 W HCPCS SELF ADMINISTERED DRUGS (ALT 637 FOR MEDICARE OP, ALT 636 FOR OP/ED): Performed by: INTERNAL MEDICINE

## 2025-02-05 PROCEDURE — 99221 1ST HOSP IP/OBS SF/LOW 40: CPT | Performed by: INTERNAL MEDICINE

## 2025-02-05 PROCEDURE — 2500000001 HC RX 250 WO HCPCS SELF ADMINISTERED DRUGS (ALT 637 FOR MEDICARE OP): Performed by: INTERNAL MEDICINE

## 2025-02-05 PROCEDURE — 2500000005 HC RX 250 GENERAL PHARMACY W/O HCPCS: Performed by: INTERNAL MEDICINE

## 2025-02-05 PROCEDURE — 2500000004 HC RX 250 GENERAL PHARMACY W/ HCPCS (ALT 636 FOR OP/ED): Performed by: INTERNAL MEDICINE

## 2025-02-05 PROCEDURE — 5A1D70Z PERFORMANCE OF URINARY FILTRATION, INTERMITTENT, LESS THAN 6 HOURS PER DAY: ICD-10-PCS | Performed by: INTERNAL MEDICINE

## 2025-02-05 PROCEDURE — 80048 BASIC METABOLIC PNL TOTAL CA: CPT | Performed by: INTERNAL MEDICINE

## 2025-02-05 PROCEDURE — 36415 COLL VENOUS BLD VENIPUNCTURE: CPT | Performed by: INTERNAL MEDICINE

## 2025-02-05 PROCEDURE — 1200000002 HC GENERAL ROOM WITH TELEMETRY DAILY

## 2025-02-05 PROCEDURE — 2500000001 HC RX 250 WO HCPCS SELF ADMINISTERED DRUGS (ALT 637 FOR MEDICARE OP): Performed by: PHARMACIST

## 2025-02-05 PROCEDURE — 85025 COMPLETE CBC W/AUTO DIFF WBC: CPT | Performed by: INTERNAL MEDICINE

## 2025-02-05 PROCEDURE — 84145 PROCALCITONIN (PCT): CPT | Mod: AHULAB | Performed by: INTERNAL MEDICINE

## 2025-02-05 PROCEDURE — 99222 1ST HOSP IP/OBS MODERATE 55: CPT | Performed by: INTERNAL MEDICINE

## 2025-02-05 PROCEDURE — 94640 AIRWAY INHALATION TREATMENT: CPT

## 2025-02-05 PROCEDURE — 83735 ASSAY OF MAGNESIUM: CPT | Performed by: PHARMACIST

## 2025-02-05 PROCEDURE — 99232 SBSQ HOSP IP/OBS MODERATE 35: CPT

## 2025-02-05 RX ORDER — LIDOCAINE AND PRILOCAINE 25; 25 MG/G; MG/G
CREAM TOPICAL
Status: DISPENSED | OUTPATIENT
Start: 2025-02-05

## 2025-02-05 RX ORDER — AZITHROMYCIN 500 MG/1
500 TABLET, FILM COATED ORAL EVERY 24 HOURS
Status: DISPENSED | OUTPATIENT
Start: 2025-02-05

## 2025-02-05 RX ORDER — HEPARIN SODIUM 5000 [USP'U]/ML
5000 INJECTION, SOLUTION INTRAVENOUS; SUBCUTANEOUS EVERY 8 HOURS SCHEDULED
Status: DISPENSED | OUTPATIENT
Start: 2025-02-06

## 2025-02-05 RX ADMIN — IPRATROPIUM BROMIDE AND ALBUTEROL SULFATE 3 ML: 2.5; .5 SOLUTION RESPIRATORY (INHALATION) at 07:10

## 2025-02-05 RX ADMIN — ASPIRIN 81 MG: 81 TABLET, COATED ORAL at 09:48

## 2025-02-05 RX ADMIN — AZITHROMYCIN DIHYDRATE 500 MG: 500 TABLET ORAL at 20:11

## 2025-02-05 RX ADMIN — ISOSORBIDE MONONITRATE 60 MG: 30 TABLET, EXTENDED RELEASE ORAL at 18:55

## 2025-02-05 RX ADMIN — GABAPENTIN 100 MG: 100 CAPSULE ORAL at 20:11

## 2025-02-05 RX ADMIN — HEPARIN SODIUM 5000 UNITS: 5000 INJECTION, SOLUTION INTRAVENOUS; SUBCUTANEOUS at 05:53

## 2025-02-05 RX ADMIN — SEVELAMER CARBONATE 800 MG: 800 TABLET, FILM COATED ORAL at 18:55

## 2025-02-05 RX ADMIN — GABAPENTIN 100 MG: 100 CAPSULE ORAL at 09:48

## 2025-02-05 RX ADMIN — HEPARIN SODIUM 5000 UNITS: 5000 INJECTION, SOLUTION INTRAVENOUS; SUBCUTANEOUS at 18:56

## 2025-02-05 RX ADMIN — CEFTRIAXONE 2 G: 2 INJECTION, POWDER, FOR SOLUTION INTRAMUSCULAR; INTRAVENOUS at 20:11

## 2025-02-05 RX ADMIN — MIRTAZAPINE 7.5 MG: 15 TABLET, FILM COATED ORAL at 20:11

## 2025-02-05 RX ADMIN — CARVEDILOL 6.25 MG: 6.25 TABLET, FILM COATED ORAL at 18:55

## 2025-02-05 RX ADMIN — SEVELAMER CARBONATE 800 MG: 800 TABLET, FILM COATED ORAL at 09:48

## 2025-02-05 RX ADMIN — Medication 4 L/MIN: at 10:38

## 2025-02-05 RX ADMIN — IPRATROPIUM BROMIDE AND ALBUTEROL SULFATE 3 ML: 2.5; .5 SOLUTION RESPIRATORY (INHALATION) at 20:47

## 2025-02-05 ASSESSMENT — COGNITIVE AND FUNCTIONAL STATUS - GENERAL
DRESSING REGULAR LOWER BODY CLOTHING: A LITTLE
TURNING FROM BACK TO SIDE WHILE IN FLAT BAD: A LOT
TOILETING: A LITTLE
MOVING FROM LYING ON BACK TO SITTING ON SIDE OF FLAT BED WITH BEDRAILS: A LOT
MOVING TO AND FROM BED TO CHAIR: A LOT
STANDING UP FROM CHAIR USING ARMS: A LOT
CLIMB 3 TO 5 STEPS WITH RAILING: A LOT
DAILY ACTIVITIY SCORE: 18
DRESSING REGULAR UPPER BODY CLOTHING: A LITTLE
WALKING IN HOSPITAL ROOM: A LOT
EATING MEALS: A LITTLE
MOBILITY SCORE: 12
PERSONAL GROOMING: A LITTLE
HELP NEEDED FOR BATHING: A LITTLE

## 2025-02-05 ASSESSMENT — PAIN SCALES - GENERAL
PAINLEVEL_OUTOF10: 0 - NO PAIN

## 2025-02-05 ASSESSMENT — PAIN - FUNCTIONAL ASSESSMENT
PAIN_FUNCTIONAL_ASSESSMENT: 0-10
PAIN_FUNCTIONAL_ASSESSMENT: NO/DENIES PAIN

## 2025-02-05 ASSESSMENT — ENCOUNTER SYMPTOMS: FATIGUE: 1

## 2025-02-05 ASSESSMENT — ACTIVITIES OF DAILY LIVING (ADL): LACK_OF_TRANSPORTATION: NO

## 2025-02-05 NOTE — PROGRESS NOTES
02/05/25 1229   Discharge Planning   Living Arrangements Children   Support Systems Children   Assistance Needed has walker, cane- HD 3x a week   Type of Residence Private residence   Expected Discharge Disposition Home   Financial Resource Strain   How hard is it for you to pay for the very basics like food, housing, medical care, and heating? Not hard   Housing Stability   In the last 12 months, was there a time when you were not able to pay the mortgage or rent on time? N   At any time in the past 12 months, were you homeless or living in a shelter (including now)? N   Transportation Needs   In the past 12 months, has lack of transportation kept you from medical appointments or from getting medications? no   In the past 12 months, has lack of transportation kept you from meetings, work, or from getting things needed for daily living? No   Intensity of Service   Intensity of Service 0-30 min     Called and spoke to patient's son Calin. Identified self and role. Patient lives with her son Calin and his wife. Patients uses a  walker and occasionally a cane. Calin transports to hemodialysis three times a week. Patient receives dialysis at [facility] in [place]. PCP is Dr. RAMÓN Corado. Verified address, phone # and insurance. Pharmacy is [facility]. Calin administers medicines to the patient. Calin and his wife do all the cooking and cleaning. Calin's wife assists patient with bathing. Plan to return home when medically cleared for discharge. CT team to follow for discharge needs.

## 2025-02-05 NOTE — NURSING NOTE
Patient arrived back from HD, ordered a new dinner tray, left upper arm dialysis site with clean dry dressing no pain. Patient states is comfortable, call light in reach, SCDs on, tele resumed, bed alarm on.

## 2025-02-05 NOTE — CARE PLAN
Problem: Pain - Adult  Goal: Verbalizes/displays adequate comfort level or baseline comfort level  Outcome: Progressing     Problem: Safety - Adult  Goal: Free from fall injury  Outcome: Progressing     Problem: Discharge Planning  Goal: Discharge to home or other facility with appropriate resources  Outcome: Progressing     Problem: Chronic Conditions and Co-morbidities  Goal: Patient's chronic conditions and co-morbidity symptoms are monitored and maintained or improved  Outcome: Progressing     Problem: Nutrition  Goal: Nutrient intake appropriate for maintaining nutritional needs  Outcome: Progressing   The patient's goals for the shift include wants to feel better and go home, have dialysis    The clinical goals for the shift include patient will remain safe throughout this shift

## 2025-02-05 NOTE — POST-PROCEDURE NOTE
Report to Receiving RN:    Report To: Zahra ANDERSEN  Time Report Called: 1800  Hand-Off Communication: Pt completed full HD TX. /71 Hr 66. Pt removed 1L  Complications During Treatment: No  Ultrafiltration Treatment: No  Medications Administered During Dialysis: No  Blood Products Administered During Dialysis: No  Labs Sent During Dialysis: Yes  Heparin Drip Rate Changes: No  Dialysis Catheter Dressing: NA  Last Dressing Change: NA    Electronic Signatures:   (Signed )   Authored:    (Signed )   Authored:     Last Updated: 6:04 PM by JOSE LUIS JUAREZ

## 2025-02-05 NOTE — PRE-PROCEDURE NOTE
Report from Sending RN:    Report From: Zahra ANDERSEN  Recent Surgery of Procedure: No  Baseline Level of Consciousness (LOC): AOx4  Oxygen Use: Yes  Type: NC  Diabetic: No  Last BP Med Given Day of Dialysis: SEE MAR  Last Pain Med Given: SEE MAr  Lab Tests to be Obtained with Dialysis: Yes  Blood Transfusion to be Given During Dialysis: No  Available IV Access: Yes  Medications to be Administered During Dialysis: No  Continuous IV Infusion Running: No  Restraints on Currently or in the Last 24 Hours: No  Hand-Off Communication: Pt is ready for dialysis  Dialysis Catheter Dressing: NA  Last Dressing Change: NA

## 2025-02-05 NOTE — H&P
Alyce Dougherty is a 84 y.o. female   Fatigue  Associated symptoms include fatigue.      Patient with a past medical history of HTN, HLD, ESRD on hemodialysis (T, Th, Sat), HFrEF, PAD, GERD, Anemia of Chronic Disease, history of Breast CA status postlumpectomy s/p displaced fractures of left superior and inferior pubic rami after a fall, Pseudogout, Osteoarthritis presented to the emergency room last week with complaints of upper upper respiratory symptoms of cough and shortness of breath  She was diagnosed with community-acquired pneumonia and discharged on oral amoxicillin and doxycycline  Patient returns with worsening symptoms of cough and shortness of breath with repeat imaging showing persistent pneumonia that is now appears to have worsened on the x-rays  Patient completed the full course of antibiotics  Denies any nausea vomiting diarrhea  Has been compliant with her dialysis  Past Medical History  Past Medical History:   Diagnosis Date    Chronic combined systolic and diastolic congestive heart failure 01/31/2024    -ECHO 8/1/2022:  The left ventricular systolic function is moderately to severely decreased, with an estimated ejection fraction of 30-35%. There is global hypokinesis of the left ventricle with minor regional variations. The left ventricular cavity size is normal. Spectral Doppler shows a pseudonormal pattern of left ventricular diastolic filling.     Chronic systolic (congestive) heart failure 06/26/2022    End stage renal disease (Multi) 06/26/2022    Essential hypertension 03/14/2023    Hyperlipidemia 03/14/2023    Metabolic encephalopathy 07/20/2022    PAD (peripheral artery disease) (CMS-[facility]) 03/14/2023       Surgical History  Past Surgical History:   Procedure Laterality Date    CT ABDOMEN PELVIS ANGIOGRAM W AND/OR WO IV CONTRAST  3/19/2023    CT ABDOMEN PELVIS ANGIOGRAM W AND/OR WO IV CONTRAST U CT    CT ABDOMEN PELVIS ANGIOGRAM W AND/OR WO IV CONTRAST  6/7/2023    CT ABDOMEN PELVIS  ANGIOGRAM W AND/OR WO IV CONTRAST 6/7/2023 [facility]    OTHER SURGICAL HISTORY  10/03/2020    Arteriovenous graft fistula creation procedure    OTHER SURGICAL HISTORY  10/03/2020    Arteriovenous fistula creation procedure    OTHER SURGICAL HISTORY  10/03/2020    Lumpectomy    OTHER SURGICAL HISTORY  10/03/2020    Cataract surgery    OTHER SURGICAL HISTORY  10/03/2020    Tubal ligation bilateral    OTHER SURGICAL HISTORY  10/03/2020    Lung biopsy    OTHER SURGICAL HISTORY  10/03/2020    Mastectomy partial    OTHER SURGICAL HISTORY  10/03/2020    Tonsillectomy        Social History  She reports that she has quit smoking. Her smoking use included cigarettes. She has never used smokeless tobacco. She reports that she does not currently use alcohol. She reports that she does not currently use drugs.    Family History  Family History   Problem Relation Name Age of Onset    Other (CVA) Mother      Coronary artery disease Mother      Lymphoma Father      Breast cancer Sister      Breast cancer Daughter          Allergies  Lisinopril, Hydralazine, Metoprolol, and Hydrochlorothiazide    Review of Systems   Constitutional:  Positive for fatigue.        Constitutional: Feeling fatigued  Eyes: no blurred vision and no diplopia.   ENT: no hearing loss, no tinnitus, no earache, no sore throat, no hoarseness and no swollen glands in the neck.   Cardiovascular: no chest pain, no tightness or heavy pressure, no shortness of breath, no palpitations and no lower extremity edema.   Respiratory: Cough and shortness of breath  Gastrointestinal: no change in bowel habits, no diarrhea, no constipation, no bloody stools, no nausea, no vomiting, no abdominal pain, no signs and symptoms of ulcer disease, no kimberly colored stools and no intolerance to fatty foods.   Genitourinary: On hemodialysis  Musculoskeletal: no arthralgias, no joint stiffness, no muscle weakness, no back pain and no difficulty walking.   Skin: no rashes, no change in skin  color and pigmentation, no skin lesions and no skin lumps.   Neurological: no headaches, no dizziness, no seizures, no tingling, no numbness, no signs and symptoms of stroke and no limb weakness.   Psychiatric: no confusion, no memory lapses or loss, no depression and no sleep disturbances.   Endocrine: no goiter, no thyroid disorder, no diabetes mellitus, no excessive thirst, no dry skin, no cold intolerance, no heat intolerance and no increased urinary frequency.   Hematologic/Lymphatic: is not slow to heal, does not bleed easily, does not bruise easily, no thrombophlebitis, no anemia and no history of blood transfusion.   All other systems have been reviewed and are negative for complaint.     Vitals:    02/05/25 1300   BP:    Pulse: 71   Resp:    Temp:    SpO2:         Scheduled medications  aspirin, 81 mg, oral, Daily  atorvastatin, 80 mg, oral, Daily  azithromycin, 500 mg, oral, q24h  carvedilol, 6.25 mg, oral, BID  cefTRIAXone, 2 g, intravenous, q24h  dextrose, 25 g, intravenous, Once  gabapentin, 100 mg, oral, TID  heparin (porcine), 5,000 Units, subcutaneous, q8h CLEMENT  [Held by provider] ipratropium, 0.5 mg, nebulization, 4x daily  ipratropium-albuteroL, 3 mL, nebulization, TID  isosorbide mononitrate ER, 60 mg, oral, Daily  lidocaine-prilocaine, , Topical, Before Dialysis  magnesium oxide, 400 mg, oral, Daily  mirtazapine, 7.5 mg, oral, Nightly  [START ON 2/6/2025] oxygen, , inhalation, Continuous - 02/gases  sevelamer carbonate, 800 mg, oral, TID      Continuous medications     PRN medications  PRN medications: benzonatate, ipratropium-albuteroL    Results from last 7 days   Lab Units 02/04/25  1444   WBC AUTO x10*3/uL 5.5   HEMOGLOBIN g/dL 12.4   HEMATOCRIT % 38.3   PLATELETS AUTO x10*3/uL 155     Results from last 7 days   Lab Units 02/04/25  1444   SODIUM mmol/L 133*   POTASSIUM mmol/L 4.1   CHLORIDE mmol/L 95*   CO2 mmol/L 25   BUN mg/dL 42*   CREATININE mg/dL 8.47*   CALCIUM mg/dL 9.7   PROTEIN TOTAL  g/dL 7.2   BILIRUBIN TOTAL mg/dL 0.8   ALK PHOS U/L 221*   ALT U/L 3*   AST U/L 17   GLUCOSE mg/dL 64*     Results from last 7 days   Lab Units 02/04/25  1622 02/04/25  1444   TROPHS ng/L 27* 30*        XR chest 2 views   Final Result   Right basilar consolidation slightly worsened from prior with slight   interval increase of a right-sided parapneumonic effusion.        Signed by: Chandu Pulido 2/4/2025 6:30 PM   Dictation workstation:   [id number]          Physical Exam      Constitutional   General appearance: Alert and in no acute distress.  Frail-appearing lady  Eyes   Inspection of eyes: Sclera and conjunctiva were normal.    Pupil exam: Pupils were equal in size. Extraocular movements were intact.   Pulmonary   Respiratory assessment: No respiratory distress, normal respiratory rhythm and effort.    Auscultation of Lungs: Bilateral crackles  Cardiovascular   Auscultation of heart: Apical pulse normal, heart rate and rhythm normal, normal S1 and S2,    Exam for edema: No peripheral edema.   Abdomen   Abdominal Exam: No bruits, normal bowel sounds, soft, non-tender, no abdominal mass palpated.    Liver and Spleen exam: No hepato-splenomegaly.   Musculoskeletal     Inspection/palpation of joints, bones and muscles: No joint swelling. Normal movement of all extremities.   Skin   Skin inspection: Normal skin color and pigmentation, normal skin turgor and no visible rash.   Neurologic   Cranial nerves: Nerves 2-12 were intact, no focal neuro defects.   Psychiatric   Orientation: Oriented to person, place, and time.    Mood and affect: Normal.      Assessment/Plan      #Community-acquired pneumonia  Failed outpatient therapy  Starting IV Rocephin/Zithromax  Consulted infectious disease    #End-stage renal disease  Consulted nephrology to resume hemodialysis    #Hypertension  Continue home medications    #Dyslipidemia  Continue home medications

## 2025-02-05 NOTE — CONSULTS
CONSULT: NEPHROLOGY SERVICE    REASON FOR CONSULT: ESKD  Admit Date: 2/4/2025  5:18 PM       HPI: Patient is a 84 y.o. female admitted 2/4/2025 with ESKD on HD, coming with SOB, cough and malaise from [facility]    Dialysis at [facility] [address], [facility] schedule, 3h/F160    Past Medical History:   Diagnosis Date    Chronic combined systolic and diastolic congestive heart failure 01/31/2024    -ECHO 8/1/2022:  The left ventricular systolic function is moderately to severely decreased, with an estimated ejection fraction of 30-35%. There is global hypokinesis of the left ventricle with minor regional variations. The left ventricular cavity size is normal. Spectral Doppler shows a pseudonormal pattern of left ventricular diastolic filling.     Chronic systolic (congestive) heart failure 06/26/2022    End stage renal disease (Multi) 06/26/2022    Essential hypertension 03/14/2023    Hyperlipidemia 03/14/2023    Metabolic encephalopathy 07/20/2022    PAD (peripheral artery disease) (CMS-[facility]) 03/14/2023     Allergies: Lisinopril, Hydralazine, Metoprolol, and Hydrochlorothiazide     Past Surgical History:   Procedure Laterality Date    CT ABDOMEN PELVIS ANGIOGRAM W AND/OR WO IV CONTRAST  3/19/2023    CT ABDOMEN PELVIS ANGIOGRAM W AND/OR WO IV CONTRAST [facility]    CT ABDOMEN PELVIS ANGIOGRAM W AND/OR WO IV CONTRAST  6/7/2023    CT ABDOMEN PELVIS ANGIOGRAM W AND/OR WO IV CONTRAST 6/7/2023 [facility] CT    OTHER SURGICAL HISTORY  10/03/2020    Arteriovenous graft fistula creation procedure    OTHER SURGICAL HISTORY  10/03/2020    Arteriovenous fistula creation procedure    OTHER SURGICAL HISTORY  10/03/2020    Lumpectomy    OTHER SURGICAL HISTORY  10/03/2020    Cataract surgery    OTHER SURGICAL HISTORY  10/03/2020    Tubal ligation bilateral    OTHER SURGICAL HISTORY  10/03/2020    Lung biopsy    OTHER SURGICAL HISTORY  10/03/2020    Mastectomy partial    OTHER SURGICAL HISTORY  10/03/2020    Tonsillectomy       Family History   Problem  Relation Name Age of Onset    Other (CVA) Mother      Coronary artery disease Mother      Lymphoma Father      Breast cancer Sister      Breast cancer Daughter         Social History  She reports that she has quit smoking. Her smoking use included cigarettes. She has never used smokeless tobacco. She reports that she does not currently use alcohol. She reports that she does not currently use drugs.    Review of Systems  As above     CURRENT HOSP MEDS:    Current Facility-Administered Medications:     aspirin EC tablet 81 mg, 81 mg, oral, Daily, Chanelle Corado MD, 81 mg at 02/05/25 0948    atorvastatin (Lipitor) tablet 80 mg, 80 mg, oral, Daily, Chanelle Corado MD    azithromycin (Zithromax) tablet 500 mg, 500 mg, oral, q24h, Winifred Hayes, Esha    benzonatate (Tessalon) capsule 100 mg, 100 mg, oral, TID PRN, Chanelle Corado MD, 100 mg at 02/04/25 2242    carvedilol (Coreg) tablet 6.25 mg, 6.25 mg, oral, BID, Chanelle Corado MD, 6.25 mg at 02/04/25 2229    cefTRIAXone (Rocephin) 2 g in dextrose 5% IV 50 mL, 2 g, intravenous, q24h, Chanelle Corado MD    dextrose 50 % injection 25 g, 25 g, intravenous, Once, Mark Snider MD    gabapentin (Neurontin) capsule 100 mg, 100 mg, oral, TID, Chanelle Corado MD, 100 mg at 02/05/25 0948    heparin (porcine) injection 5,000 Units, 5,000 Units, subcutaneous, q8h CLEMENT, Chanelle Corado MD, 5,000 Units at 02/05/25 0553    [Held by provider] ipratropium (Atrovent) 0.02 % nebulizer solution 0.5 mg, 0.5 mg, nebulization, 4x daily, Chai Carlton, PharmD    ipratropium-albuteroL (Duo-Neb) 0.5-2.5 mg/3 mL nebulizer solution 3 mL, 3 mL, nebulization, TID, Chanelle Corado MD, 3 mL at 02/05/25 0710    ipratropium-albuteroL (Duo-Neb) 0.5-2.5 mg/3 mL nebulizer solution 3 mL, 3 mL, nebulization, q2h PRN, Chanelle Corado MD    isosorbide mononitrate ER (Imdur) 24 hr tablet 30 mg, 30 mg, oral, Daily, Chanelle Corado MD    isosorbide mononitrate ER (Imdur) 24 hr tablet 60 mg, 60 mg, oral, Daily,  "Chanelle Corado MD    lidocaine-prilocaine (Emla) cream, , Topical, Before Dialysis, Florencio Vizcaino MD    magnesium oxide (Mag-Ox) tablet 400 mg, 400 mg, oral, Daily, Chanelle Corado MD    mirtazapine (Remeron) tablet 7.5 mg, 7.5 mg, oral, Nightly, Chanelle Corado MD, 7.5 mg at 02/04/25 2229    [START ON 2/6/2025] oxygen (O2) therapy, , inhalation, Continuous - 02/gases, Chanelle Corado MD    sevelamer carbonate (Renvela) tablet 800 mg, 800 mg, oral, TID, Chanelle Corado MD, 800 mg at 02/05/25 0948     PHYSICAL EXAM:  /67 (BP Location: Right arm, Patient Position: Sitting)   Pulse 71   Temp 36.4 °C (97.5 °F) (Oral)   Resp 14   Ht 1.676 m (5' 6\")   Wt 54.9 kg (121 lb)   SpO2 100%   BMI 19.53 kg/m²   No intake or output data in the 24 hours ending 02/05/25 1156  Gen: AAO, NAD, thin  Neck: No JVD  Cardiac: RRR  Resp: clear BS  Abd: Soft, non tender, +BS, non distended   Ext: No edema   Access: LUE AVF  Neuro: moves 4 ext  Peripheral Pulses: weak peripheral pulses.  Skin: Skin color, texture, turgor normal, no suspicious rashes or lesions.    LABS:   Results for orders placed or performed during the hospital encounter of 02/04/25 (from the past 24 hours)   ECG 12 lead   Result Value Ref Range    Ventricular Rate 75 BPM    Atrial Rate 75 BPM    NM Interval 212 ms    QRS Duration 84 ms    QT Interval 392 ms    QTC Calculation(Bazett) 437 ms    P Axis 94 degrees    R Axis 130 degrees    T Axis -41 degrees    QRS Count 12 beats    Q Onset 210 ms    P Onset 104 ms    P Offset 159 ms    T Offset 406 ms    QTC Fredericia 422 ms   CBC and Auto Differential   Result Value Ref Range    WBC 5.5 4.4 - 11.3 x10*3/uL    nRBC 0.0 0.0 - 0.0 /100 WBCs    RBC 4.39 4.00 - 5.20 x10*6/uL    Hemoglobin 12.4 12.0 - 16.0 g/dL    Hematocrit 38.3 36.0 - 46.0 %    MCV 87 80 - 100 fL    MCH 28.2 26.0 - 34.0 pg    MCHC 32.4 32.0 - 36.0 g/dL    RDW 18.2 (H) 11.5 - 14.5 %    Platelets 155 150 - 450 x10*3/uL    Neutrophils % 72.7 40.0 - 80.0 % "    Immature Granulocytes %, Automated 0.6 0.0 - 0.9 %    Lymphocytes % 13.9 13.0 - 44.0 %    Monocytes % 9.4 2.0 - 10.0 %    Eosinophils % 2.8 0.0 - 6.0 %    Basophils % 0.6 0.0 - 2.0 %    Neutrophils Absolute 3.97 1.60 - 5.50 x10*3/uL    Immature Granulocytes Absolute, Automated 0.03 0.00 - 0.50 x10*3/uL    Lymphocytes Absolute 0.76 (L) 0.80 - 3.00 x10*3/uL    Monocytes Absolute 0.51 0.05 - 0.80 x10*3/uL    Eosinophils Absolute 0.15 0.00 - 0.40 x10*3/uL    Basophils Absolute 0.03 0.00 - 0.10 x10*3/uL   Comprehensive metabolic panel   Result Value Ref Range    Glucose 64 (L) 74 - 99 mg/dL    Sodium 133 (L) 136 - 145 mmol/L    Potassium 4.1 3.5 - 5.3 mmol/L    Chloride 95 (L) 98 - 107 mmol/L    Bicarbonate 25 21 - 32 mmol/L    Anion Gap 17 10 - 20 mmol/L    Urea Nitrogen 42 (H) 6 - 23 mg/dL    Creatinine 8.47 (H) 0.50 - 1.05 mg/dL    eGFR 4 (L) >60 mL/min/1.73m*2    Calcium 9.7 8.6 - 10.3 mg/dL    Albumin 3.0 (L) 3.4 - 5.0 g/dL    Alkaline Phosphatase 221 (H) 33 - 136 U/L    Total Protein 7.2 6.4 - 8.2 g/dL    AST 17 9 - 39 U/L    Bilirubin, Total 0.8 0.0 - 1.2 mg/dL    ALT 3 (L) 7 - 45 U/L   Magnesium   Result Value Ref Range    Magnesium 2.71 (H) 1.60 - 2.40 mg/dL   Troponin I, High Sensitivity, Initial   Result Value Ref Range    Troponin I, High Sensitivity 30 (H) 0 - 13 ng/L   Protime-INR   Result Value Ref Range    Protime 17.8 (H) 9.8 - 12.8 seconds    INR 1.6 (H) 0.9 - 1.1   aPTT   Result Value Ref Range    aPTT 46 (H) 27 - 38 seconds   BLOOD GAS VENOUS FULL PANEL   Result Value Ref Range    POCT pH, Venous 7.33 7.33 - 7.43 pH    POCT pCO2, Venous 56 (H) 41 - 51 mm Hg    POCT pO2, Venous 31 (L) 35 - 45 mm Hg    POCT SO2, Venous 50 45 - 75 %    POCT Oxy Hemoglobin, Venous 48.6 45.0 - 75.0 %    POCT Hematocrit Calculated, Venous 41.0 36.0 - 46.0 %    POCT Sodium, Venous 130 (L) 136 - 145 mmol/L    POCT Potassium, Venous 4.0 3.5 - 5.3 mmol/L    POCT Chloride, Venous 95 (L) 98 - 107 mmol/L    POCT Ionized  Calicum, Venous 1.25 1.10 - 1.33 mmol/L    POCT Glucose, Venous 71 (L) 74 - 99 mg/dL    POCT Lactate, Venous 1.3 0.4 - 2.0 mmol/L    POCT Base Excess, Venous 2.3 -2.0 - 3.0 mmol/L    POCT HCO3 Calculated, Venous 29.5 (H) 22.0 - 26.0 mmol/L    POCT Hemoglobin, Venous 13.7 12.0 - 16.0 g/dL    POCT Anion Gap, Venous 10.0 10.0 - 25.0 mmol/L    Patient Temperature 37.0 degrees Celsius    FiO2 21 %   Sars-CoV-2 and Influenza A/B PCR   Result Value Ref Range    Flu A Result Not Detected Not Detected    Flu B Result Not Detected Not Detected    Coronavirus 2019, PCR Not Detected Not Detected   RSV PCR   Result Value Ref Range    RSV PCR Not Detected Not Detected   Troponin, High Sensitivity, 1 Hour   Result Value Ref Range    Troponin I, High Sensitivity 27 (H) 0 - 13 ng/L   Lactate   Result Value Ref Range    Lactate 0.8 0.4 - 2.0 mmol/L   Blood Culture    Specimen: Peripheral Venipuncture; Blood culture   Result Value Ref Range    Blood Culture Loaded on Instrument - Culture in progress    Blood Culture    Specimen: Peripheral Venipuncture; Blood culture   Result Value Ref Range    Blood Culture Loaded on Instrument - Culture in progress    MRSA Surveillance for Vancomycin De-escalation, PCR    Specimen: Anterior Nares; Swab   Result Value Ref Range    MRSA PCR Not Detected Not Detected   POCT GLUCOSE   Result Value Ref Range    POCT Glucose 100 (H) 74 - 99 mg/dL     *Note: Due to a large number of results and/or encounters for the requested time period, some results have not been displayed. A complete set of results can be found in Results Review.       DATA:   Diagnostic tests reviewed for today's visit:    Labs and meds    ASSESSMENT AND PLAN:  - ESKD on HD: euvolemic, last HD on 1/31  Will plan in doing HD today and will keep her MWF schedule  HTN: controlled  Hb 12.4      Greatly appreciate the opportunity to assist in the care of this patient. Will continue to follow.     Signature: Florencio Vizcaino MD  Division of  Nephrology and Hypertension

## 2025-02-05 NOTE — PROGRESS NOTES
Pharmacy Medication History     Source of Information: Per patient     Additional concerns with the patient's PTA list.   N/a   The following updates were made to the Prior to Admission medication list:     Medications ADDED:   N/a  Medications CHANGED:  Sevelamer is 1 tablet twice daily with meals  Medications REMOVED:   N/a  Medications NOT TAKING:   Gabapentin 1 capsule at bedtime isn't correct  Isosorbide 30mg  ( she is on 60mg )   Spiriva      Allergy reviewed : Yes    Meds 2 Beds : No    Outpatient pharmacy confirmed and updated in chart : Yes    [facility]: [facility]     The list below reflectives the updated PTA list. Please review each medication in order reconciliation for additional clarification and justification.    Prior to Admission Medications   Prescriptions Last Dose Informant   albuterol (ProAir HFA) 90 mcg/actuation inhaler     Sig: Inhale 2 puffs every 4 hours if needed for wheezing or shortness of breath.   amoxicillin (Amoxil) 500 mg capsule     Sig: Take 1 capsule (500 mg) by mouth every 12 hours for 7 days.   aspirin 81 mg EC tablet 2/2/2025 Self   Sig: Take 1 tablet (81 mg) by mouth once daily.   atorvastatin (Lipitor) 80 mg tablet 2/2/2025    Sig: Take 1 tablet (80 mg) by mouth once daily.   benzonatate (Tessalon) 100 mg capsule     Sig: Take 1 capsule (100 mg) by mouth 3 times a day as needed for cough. Do not crush or chew.   carvedilol (Coreg) 6.25 mg tablet 2/2/2025    Sig: Take 1 tablet (6.25 mg) by mouth 2 times a day.   doxycycline (Adoxa) 100 mg tablet     Sig: Take 1 tablet (100 mg) by mouth 2 times a day for 5 days. Take with a full glass of water and do not lie down for at least 30 minutes after. Complete full course even if symptoms improve.              gabapentin (Neurontin) 100 mg capsule 2/2/2025    Sig: Take 1 capsule (100 mg) by mouth 3 times a day.              isosorbide mononitrate ER (Imdur) 60 mg 24 hr tablet 2/2/2025    Sig: Take 1 tablet (60 mg) by  mouth once daily.   magnesium oxide (Mag-Ox) 400 mg (241.3 mg magnesium) tablet 2/2/2025 Self   Sig: Take 1 tablet (400 mg) by mouth once daily.   melatonin 3 mg tablet  Self   Sig: Take 1 tablet (3 mg) by mouth as needed at bedtime for sleep.              mirtazapine (Remeron) 7.5 mg tablet 2/2/2025    Sig: Take 1 tablet (7.5 mg) by mouth once daily at bedtime.   pantoprazole (ProtoNix) 40 mg EC tablet 2/2/2025    Sig: TAKE 1 TABLET BY MOUTH EVERY MORNING BEFORE EATING   Patient taking differently: Take 1 tablet (40 mg) by mouth once daily in the morning. Take before meals. TAKE 1 TABLET BY MOUTH EVERY MORNING BEFORE EATING   sevelamer HCl (Renagel) 800 mg tablet 2/2/2025    Sig: Take 1 tablet (800 mg) by mouth once daily. Before meals   Patient taking differently: Take 1 tablet (800 mg) by mouth 2 times a day. Before meals                 Facility-Administered Medications: None       The list below reflectives the updated allergy list. Please review each documented allergy for additional clarification and justification.    Allergies   Allergen Reactions    Lisinopril Angioedema    Hydralazine Unknown, Itching and Other    Metoprolol Itching    Hydrochlorothiazide Itching, Rash and Unknown     Feels too tired and voids to often on this medication and refuses to use.          02/05/25 at 12:07 PM - Dulce Gan

## 2025-02-05 NOTE — CONSULTS
Consults  Reason for Consult:  Evaluation and management of pneumonia    Patient is seen at the request of Dr. Chanelle Corado    Subjective   History of Present Illness:  Alyce Dougherty is a 84 y.o. female who presented on 2/4/2025 with shortness of breath and a cough.  She says that for the past couple of weeks she has had a cough and for approximately 1 week prior to this admission she has felt ill.  She had been placed on amoxicillin and doxycycline which she took but did not feel better so she came back to the emergency department on 2/4/2025.  On arrival she had a temperature of 37.4 with a white blood count 5.5.  She had repeat testing for influenza, COVID-19, and RSV which were negative.  An MRSA nasal swab is negative.  She went for a chest x-ray which showed increased right lung consolidation.  She has been placed on ceftriaxone and azithromycin.  She is breathing adequately right now on 2 L/min nasal cannula oxygen.  She is receiving dialysis.  She denies any nausea vomiting or diarrhea    Past Medical History:   has a past medical history of Chronic combined systolic and diastolic congestive heart failure (01/31/2024), Chronic systolic (congestive) heart failure (06/26/2022), End stage renal disease (Multi) (06/26/2022), Essential hypertension (03/14/2023), Hyperlipidemia (03/14/2023), Metabolic encephalopathy (07/20/2022), and PAD (peripheral artery disease) ([facility]) (03/14/2023).    has a past surgical history that includes Other surgical history (10/03/2020); Other surgical history (10/03/2020); Other surgical history (10/03/2020); Other surgical history (10/03/2020); Other surgical history (10/03/2020); Other surgical history (10/03/2020); Other surgical history (10/03/2020); Other surgical history (10/03/2020); CT angio abdomen pelvis w and or wo IV IV contrast (3/19/2023); and CT angio abdomen pelvis w and or wo IV IV contrast (6/7/2023).     Social History:   reports that she has quit smoking.  Her smoking use included cigarettes. She has never used smokeless tobacco. She reports that she does not currently use alcohol. She reports that she does not currently use drugs.     She lives with her son and her daughter-in-law    Family History:  No sick contacts    Review of Systems:  Shortness of breath and a cough.  She says she only urinates approximately once per week    Allergies:  Lisinopril, Hydralazine, Metoprolol, and Hydrochlorothiazide      Objective   Current Facility-Administered Medications   Medication Dose Route Frequency Provider Last Rate Last Admin    aspirin EC tablet 81 mg  81 mg oral Daily Chanelle Corado MD   81 mg at 02/05/25 0948    atorvastatin (Lipitor) tablet 80 mg  80 mg oral Daily Chanelle Corado MD        azithromycin (Zithromax) tablet 500 mg  500 mg oral q24h Winifred Hayes PharmD        benzonatate (Tessalon) capsule 100 mg  100 mg oral TID PRN Chanelle Corado MD   100 mg at 02/04/25 2242    carvedilol (Coreg) tablet 6.25 mg  6.25 mg oral BID Chanelle Corado MD   6.25 mg at 02/04/25 2229    cefTRIAXone (Rocephin) 2 g in dextrose 5% IV 50 mL  2 g intravenous q24h Chanelle Corado MD        dextrose 50 % injection 25 g  25 g intravenous Once Mark Snider MD        gabapentin (Neurontin) capsule 100 mg  100 mg oral TID Chanelle Corado MD   100 mg at 02/05/25 0948    heparin (porcine) injection 5,000 Units  5,000 Units subcutaneous q8h CLEMENT Chanelle Corado MD   5,000 Units at 02/05/25 0553    [Held by provider] ipratropium (Atrovent) 0.02 % nebulizer solution 0.5 mg  0.5 mg nebulization 4x daily Chai Carlton, PharmD        ipratropium-albuteroL (Duo-Neb) 0.5-2.5 mg/3 mL nebulizer solution 3 mL  3 mL nebulization TID Chanelle Corado MD   3 mL at 02/05/25 0710    ipratropium-albuteroL (Duo-Neb) 0.5-2.5 mg/3 mL nebulizer solution 3 mL  3 mL nebulization q2h PRN Chanelle Corado MD        isosorbide mononitrate ER (Imdur) 24 hr tablet 60 mg  60 mg oral Daily Chanelle Corado MD         lidocaine-prilocaine (Emla) cream   Topical Before Dialysis Florencio Vizcaino MD        magnesium oxide (Mag-Ox) tablet 400 mg  400 mg oral Daily Chanelle Corado MD        mirtazapine (Remeron) tablet 7.5 mg  7.5 mg oral Nightly Chanelle Corado MD   7.5 mg at 02/04/25 2229    [START ON 2/6/2025] oxygen (O2) therapy   inhalation Continuous - 02/gases Chanelle Corado MD        sevelamer carbonate (Renvela) tablet 800 mg  800 mg oral TID Chanelle Corado MD   800 mg at 02/05/25 0948       Physical Exam:  /67 (BP Location: Right arm, Patient Position: Sitting)   Pulse 71   Temp 36.9 °C (98.4 °F) (Temporal)   Resp 14   Wt 54.9 kg (121 lb)   SpO2 100%    General: no acute distress, lying in bed, interactive, receiving dialysis  HEENT: pink pharynx  CVS: RRR  Resp: decreased breath sounds in bases, wearing nasal cannula oxygen  ABD: soft, NT, ND  EXT: no edema  Skin: no rash     Relevant Results:    Labs:  Results from last 72 hours   Lab Units 02/05/25  1310 02/04/25  1444   SODIUM mmol/L 132* 133*   POTASSIUM mmol/L 3.9 4.1   CHLORIDE mmol/L 94* 95*   BUN mg/dL 47* 42*   CREATININE mg/dL 9.35* 8.47*   MAGNESIUM mg/dL 2.63* 2.71*     Results from last 72 hours   Lab Units 02/05/25  1310 02/04/25  1444   WBC AUTO x10*3/uL 5.4 5.5   HEMOGLOBIN g/dL 12.1 12.4   HEMATOCRIT % 37.5 38.3   PLATELETS AUTO x10*3/uL 143* 155       Microbiology data: I have personally and independently reviewed and intrepreted the lab results  1/27/2025 sputum culture showed saliva  1/27/2025 influenza swab negative; COVID-19 test negative; RSV swab negative  2/4/2025 influenza swab negative; COVID-19 test negative; RSV swab negative    Imaging data: I have personally and independently reviewed and interpreted the imaging studies  2/4/2025 chest x-ray shows right-sided consolidation     Assessment/Plan     MY IMPRESSION & RECOMMENDATIONS:  Right pneumonia with acute hypoxic respiratory failure.  I have personally and independently reviewed and  interpreted the laboratory tests, imaging studies, and the documentation from other healthcare providers.  I would recommend that we continue her on the ceftriaxone and azithromycin.  I will monitor for side effects from these antibiotics which can include rash, diarrhea, and nausea.  I am going to refrain from ordering a urine Legionella and urine pneumococcal antigen since she does not produce much urine.  We can then adjust her treatment based on her clinical progress.  Her prognosis is uncertain especially since she failed outpatient treatment with oral antibiotics.    -Continue ceftriaxone and azithromycin for now  -Collect procalcitonin    Other issues:  #Hypertension  #Hyperlipidemia  #End-stage renal disease on dialysis  #HFrEF  #PAD  #GERD  #Anemia of chronic disease  #Breast cancer status postlumpectomy  #Osteoarthritis  #History of pelvic fractures       Juan Alberto Sood MD

## 2025-02-05 NOTE — PROGRESS NOTES
"Alyce Dougherty is a 84 y.o. female on day 1 of admission presenting with Pneumonia of right lower lobe due to infectious organism.    Subjective   Patient feeling weak, still feels short of breath. Transport came during end of eval to get her to dialysis session.    Objective   PE:  Constitutional: A&Ox3, calm and cooperative, NAD  Cardiovascular: Normal rate and regular rhythm.  Respiratory/Thorax: Good symmetric chest expansion. No labored breathing.  Gastrointestinal: Abdomen slightly distended, soft  Genitourinary: Voiding independently   Extremities: No peripheral edema  Neurological: A&Ox3, No focal deficits  Psychological: Appropriate mood and behavior  Skin: Warm and dry    Last Recorded Vitals  Blood pressure 144/67, pulse 71, temperature 36.9 °C (98.4 °F), temperature source Temporal, resp. rate 14, height 1.676 m (5' 6\"), weight 54.9 kg (121 lb), SpO2 100%.  Intake/Output last 3 Shifts:  No intake/output data recorded.    Relevant Results  Scheduled medications  aspirin, 81 mg, oral, Daily  atorvastatin, 80 mg, oral, Daily  azithromycin, 500 mg, oral, q24h  carvedilol, 6.25 mg, oral, BID  cefTRIAXone, 2 g, intravenous, q24h  dextrose, 25 g, intravenous, Once  gabapentin, 100 mg, oral, TID  heparin (porcine), 5,000 Units, subcutaneous, q8h CLEMENT  [Held by provider] ipratropium, 0.5 mg, nebulization, 4x daily  ipratropium-albuteroL, 3 mL, nebulization, TID  isosorbide mononitrate ER, 60 mg, oral, Daily  lidocaine-prilocaine, , Topical, Before Dialysis  magnesium oxide, 400 mg, oral, Daily  mirtazapine, 7.5 mg, oral, Nightly  [START ON 2/6/2025] oxygen, , inhalation, Continuous - 02/gases  sevelamer carbonate, 800 mg, oral, TID      Continuous medications     PRN medications  PRN medications: benzonatate, ipratropium-albuteroL    Results for orders placed or performed during the hospital encounter of 02/04/25 (from the past 24 hours)   CBC and Auto Differential   Result Value Ref Range    WBC 5.5 4.4 - 11.3 " x10*3/uL    nRBC 0.0 0.0 - 0.0 /100 WBCs    RBC 4.39 4.00 - 5.20 x10*6/uL    Hemoglobin 12.4 12.0 - 16.0 g/dL    Hematocrit 38.3 36.0 - 46.0 %    MCV 87 80 - 100 fL    MCH 28.2 26.0 - 34.0 pg    MCHC 32.4 32.0 - 36.0 g/dL    RDW 18.2 (H) 11.5 - 14.5 %    Platelets 155 150 - 450 x10*3/uL    Neutrophils % 72.7 40.0 - 80.0 %    Immature Granulocytes %, Automated 0.6 0.0 - 0.9 %    Lymphocytes % 13.9 13.0 - 44.0 %    Monocytes % 9.4 2.0 - 10.0 %    Eosinophils % 2.8 0.0 - 6.0 %    Basophils % 0.6 0.0 - 2.0 %    Neutrophils Absolute 3.97 1.60 - 5.50 x10*3/uL    Immature Granulocytes Absolute, Automated 0.03 0.00 - 0.50 x10*3/uL    Lymphocytes Absolute 0.76 (L) 0.80 - 3.00 x10*3/uL    Monocytes Absolute 0.51 0.05 - 0.80 x10*3/uL    Eosinophils Absolute 0.15 0.00 - 0.40 x10*3/uL    Basophils Absolute 0.03 0.00 - 0.10 x10*3/uL   Comprehensive metabolic panel   Result Value Ref Range    Glucose 64 (L) 74 - 99 mg/dL    Sodium 133 (L) 136 - 145 mmol/L    Potassium 4.1 3.5 - 5.3 mmol/L    Chloride 95 (L) 98 - 107 mmol/L    Bicarbonate 25 21 - 32 mmol/L    Anion Gap 17 10 - 20 mmol/L    Urea Nitrogen 42 (H) 6 - 23 mg/dL    Creatinine 8.47 (H) 0.50 - 1.05 mg/dL    eGFR 4 (L) >60 mL/min/1.73m*2    Calcium 9.7 8.6 - 10.3 mg/dL    Albumin 3.0 (L) 3.4 - 5.0 g/dL    Alkaline Phosphatase 221 (H) 33 - 136 U/L    Total Protein 7.2 6.4 - 8.2 g/dL    AST 17 9 - 39 U/L    Bilirubin, Total 0.8 0.0 - 1.2 mg/dL    ALT 3 (L) 7 - 45 U/L   Magnesium   Result Value Ref Range    Magnesium 2.71 (H) 1.60 - 2.40 mg/dL   Troponin I, High Sensitivity, Initial   Result Value Ref Range    Troponin I, High Sensitivity 30 (H) 0 - 13 ng/L   Protime-INR   Result Value Ref Range    Protime 17.8 (H) 9.8 - 12.8 seconds    INR 1.6 (H) 0.9 - 1.1   aPTT   Result Value Ref Range    aPTT 46 (H) 27 - 38 seconds   BLOOD GAS VENOUS FULL PANEL   Result Value Ref Range    POCT pH, Venous 7.33 7.33 - 7.43 pH    POCT pCO2, Venous 56 (H) 41 - 51 mm Hg    POCT pO2, Venous 31  (L) 35 - 45 mm Hg    POCT SO2, Venous 50 45 - 75 %    POCT Oxy Hemoglobin, Venous 48.6 45.0 - 75.0 %    POCT Hematocrit Calculated, Venous 41.0 36.0 - 46.0 %    POCT Sodium, Venous 130 (L) 136 - 145 mmol/L    POCT Potassium, Venous 4.0 3.5 - 5.3 mmol/L    POCT Chloride, Venous 95 (L) 98 - 107 mmol/L    POCT Ionized Calicum, Venous 1.25 1.10 - 1.33 mmol/L    POCT Glucose, Venous 71 (L) 74 - 99 mg/dL    POCT Lactate, Venous 1.3 0.4 - 2.0 mmol/L    POCT Base Excess, Venous 2.3 -2.0 - 3.0 mmol/L    POCT HCO3 Calculated, Venous 29.5 (H) 22.0 - 26.0 mmol/L    POCT Hemoglobin, Venous 13.7 12.0 - 16.0 g/dL    POCT Anion Gap, Venous 10.0 10.0 - 25.0 mmol/L    Patient Temperature 37.0 degrees Celsius    FiO2 21 %   Sars-CoV-2 and Influenza A/B PCR   Result Value Ref Range    Flu A Result Not Detected Not Detected    Flu B Result Not Detected Not Detected    Coronavirus 2019, PCR Not Detected Not Detected   RSV PCR   Result Value Ref Range    RSV PCR Not Detected Not Detected   Troponin, High Sensitivity, 1 Hour   Result Value Ref Range    Troponin I, High Sensitivity 27 (H) 0 - 13 ng/L   Lactate   Result Value Ref Range    Lactate 0.8 0.4 - 2.0 mmol/L   Blood Culture    Specimen: Peripheral Venipuncture; Blood culture   Result Value Ref Range    Blood Culture Loaded on Instrument - Culture in progress    Blood Culture    Specimen: Peripheral Venipuncture; Blood culture   Result Value Ref Range    Blood Culture Loaded on Instrument - Culture in progress    MRSA Surveillance for Vancomycin De-escalation, PCR    Specimen: Anterior Nares; Swab   Result Value Ref Range    MRSA PCR Not Detected Not Detected   POCT GLUCOSE   Result Value Ref Range    POCT Glucose 100 (H) 74 - 99 mg/dL     *Note: Due to a large number of results and/or encounters for the requested time period, some results have not been displayed. A complete set of results can be found in Results Review.     Assessment/Plan     #CAP  - Failed previous management with  doxycycline, Augmentin  - IV ceftriaxone, azithromycin  - ID consult    #HTN  - Home medications    #HLD  - Home statin    #ESRD  - Neph consult  - HD session this afternoon    DVT proph: SCDs/heparin    Discussed with Dr Corado.    I spent 35 minutes in the professional and overall care of this patient.    Se Singh PA-C

## 2025-02-06 LAB
ANION GAP SERPL CALC-SCNC: 14 MMOL/L (ref 10–20)
BASOPHILS # BLD AUTO: 0.03 X10*3/UL (ref 0–0.1)
BASOPHILS NFR BLD AUTO: 0.7 %
BUN SERPL-MCNC: 25 MG/DL (ref 6–23)
CALCIUM SERPL-MCNC: 8.8 MG/DL (ref 8.6–10.3)
CHLORIDE SERPL-SCNC: 96 MMOL/L (ref 98–107)
CO2 SERPL-SCNC: 28 MMOL/L (ref 21–32)
CREAT SERPL-MCNC: 6.43 MG/DL (ref 0.5–1.05)
EGFRCR SERPLBLD CKD-EPI 2021: 6 ML/MIN/1.73M*2
EOSINOPHIL # BLD AUTO: 0.19 X10*3/UL (ref 0–0.4)
EOSINOPHIL NFR BLD AUTO: 4.2 %
ERYTHROCYTE [DISTWIDTH] IN BLOOD BY AUTOMATED COUNT: 18.3 % (ref 11.5–14.5)
GLUCOSE SERPL-MCNC: 79 MG/DL (ref 74–99)
HCT VFR BLD AUTO: 37.3 % (ref 36–46)
HGB BLD-MCNC: 11.7 G/DL (ref 12–16)
IMM GRANULOCYTES # BLD AUTO: 0.03 X10*3/UL (ref 0–0.5)
IMM GRANULOCYTES NFR BLD AUTO: 0.7 % (ref 0–0.9)
LYMPHOCYTES # BLD AUTO: 0.62 X10*3/UL (ref 0.8–3)
LYMPHOCYTES NFR BLD AUTO: 13.7 %
MAGNESIUM SERPL-MCNC: 2.42 MG/DL (ref 1.6–2.4)
MCH RBC QN AUTO: 28 PG (ref 26–34)
MCHC RBC AUTO-ENTMCNC: 31.4 G/DL (ref 32–36)
MCV RBC AUTO: 89 FL (ref 80–100)
MONOCYTES # BLD AUTO: 0.49 X10*3/UL (ref 0.05–0.8)
MONOCYTES NFR BLD AUTO: 10.9 %
NEUTROPHILS # BLD AUTO: 3.15 X10*3/UL (ref 1.6–5.5)
NEUTROPHILS NFR BLD AUTO: 69.8 %
NRBC BLD-RTO: 0 /100 WBCS (ref 0–0)
PLATELET # BLD AUTO: 118 X10*3/UL (ref 150–450)
POTASSIUM SERPL-SCNC: 3.7 MMOL/L (ref 3.5–5.3)
PROCALCITONIN SERPL-MCNC: 37.28 NG/ML
RBC # BLD AUTO: 4.18 X10*6/UL (ref 4–5.2)
SODIUM SERPL-SCNC: 134 MMOL/L (ref 136–145)
WBC # BLD AUTO: 4.5 X10*3/UL (ref 4.4–11.3)

## 2025-02-06 PROCEDURE — 2500000001 HC RX 250 WO HCPCS SELF ADMINISTERED DRUGS (ALT 637 FOR MEDICARE OP): Performed by: PHARMACIST

## 2025-02-06 PROCEDURE — 84145 PROCALCITONIN (PCT): CPT | Mod: AHULAB | Performed by: INTERNAL MEDICINE

## 2025-02-06 PROCEDURE — 2500000002 HC RX 250 W HCPCS SELF ADMINISTERED DRUGS (ALT 637 FOR MEDICARE OP, ALT 636 FOR OP/ED): Performed by: INTERNAL MEDICINE

## 2025-02-06 PROCEDURE — 83735 ASSAY OF MAGNESIUM: CPT

## 2025-02-06 PROCEDURE — 2500000001 HC RX 250 WO HCPCS SELF ADMINISTERED DRUGS (ALT 637 FOR MEDICARE OP): Performed by: INTERNAL MEDICINE

## 2025-02-06 PROCEDURE — 9420000001 HC RT PATIENT EDUCATION 5 MIN

## 2025-02-06 PROCEDURE — 80048 BASIC METABOLIC PNL TOTAL CA: CPT | Performed by: INTERNAL MEDICINE

## 2025-02-06 PROCEDURE — 99232 SBSQ HOSP IP/OBS MODERATE 35: CPT | Performed by: INTERNAL MEDICINE

## 2025-02-06 PROCEDURE — 36415 COLL VENOUS BLD VENIPUNCTURE: CPT | Performed by: INTERNAL MEDICINE

## 2025-02-06 PROCEDURE — 2500000004 HC RX 250 GENERAL PHARMACY W/ HCPCS (ALT 636 FOR OP/ED): Performed by: INTERNAL MEDICINE

## 2025-02-06 PROCEDURE — 85025 COMPLETE CBC W/AUTO DIFF WBC: CPT | Performed by: INTERNAL MEDICINE

## 2025-02-06 PROCEDURE — 94640 AIRWAY INHALATION TREATMENT: CPT

## 2025-02-06 PROCEDURE — 1200000002 HC GENERAL ROOM WITH TELEMETRY DAILY

## 2025-02-06 RX ADMIN — ATORVASTATIN CALCIUM 80 MG: 80 TABLET, FILM COATED ORAL at 08:33

## 2025-02-06 RX ADMIN — IPRATROPIUM BROMIDE AND ALBUTEROL SULFATE 3 ML: 2.5; .5 SOLUTION RESPIRATORY (INHALATION) at 07:11

## 2025-02-06 RX ADMIN — MIRTAZAPINE 7.5 MG: 15 TABLET, FILM COATED ORAL at 20:35

## 2025-02-06 RX ADMIN — ASPIRIN 81 MG: 81 TABLET, COATED ORAL at 08:33

## 2025-02-06 RX ADMIN — SEVELAMER CARBONATE 800 MG: 800 TABLET, FILM COATED ORAL at 17:19

## 2025-02-06 RX ADMIN — CARVEDILOL 6.25 MG: 6.25 TABLET, FILM COATED ORAL at 08:33

## 2025-02-06 RX ADMIN — GABAPENTIN 100 MG: 100 CAPSULE ORAL at 14:11

## 2025-02-06 RX ADMIN — ISOSORBIDE MONONITRATE 60 MG: 30 TABLET, EXTENDED RELEASE ORAL at 08:33

## 2025-02-06 RX ADMIN — CEFTRIAXONE 2 G: 2 INJECTION, POWDER, FOR SOLUTION INTRAMUSCULAR; INTRAVENOUS at 20:38

## 2025-02-06 RX ADMIN — AZITHROMYCIN DIHYDRATE 500 MG: 500 TABLET ORAL at 20:35

## 2025-02-06 RX ADMIN — HEPARIN SODIUM 5000 UNITS: 5000 INJECTION INTRAVENOUS; SUBCUTANEOUS at 13:59

## 2025-02-06 RX ADMIN — GABAPENTIN 100 MG: 100 CAPSULE ORAL at 08:33

## 2025-02-06 RX ADMIN — SEVELAMER CARBONATE 800 MG: 800 TABLET, FILM COATED ORAL at 11:54

## 2025-02-06 RX ADMIN — SEVELAMER CARBONATE 800 MG: 800 TABLET, FILM COATED ORAL at 08:33

## 2025-02-06 RX ADMIN — CARVEDILOL 6.25 MG: 6.25 TABLET, FILM COATED ORAL at 20:35

## 2025-02-06 RX ADMIN — HEPARIN SODIUM 5000 UNITS: 5000 INJECTION INTRAVENOUS; SUBCUTANEOUS at 04:32

## 2025-02-06 RX ADMIN — GABAPENTIN 100 MG: 100 CAPSULE ORAL at 20:42

## 2025-02-06 ASSESSMENT — PAIN SCALES - GENERAL
PAINLEVEL_OUTOF10: 0 - NO PAIN

## 2025-02-06 ASSESSMENT — COGNITIVE AND FUNCTIONAL STATUS - GENERAL
WALKING IN HOSPITAL ROOM: A LITTLE
HELP NEEDED FOR BATHING: A LITTLE
TOILETING: A LITTLE
STANDING UP FROM CHAIR USING ARMS: A LITTLE
TURNING FROM BACK TO SIDE WHILE IN FLAT BAD: A LITTLE
EATING MEALS: A LITTLE
DAILY ACTIVITIY SCORE: 18
DRESSING REGULAR LOWER BODY CLOTHING: A LITTLE
CLIMB 3 TO 5 STEPS WITH RAILING: A LOT
MOBILITY SCORE: 17
MOVING TO AND FROM BED TO CHAIR: A LITTLE
DRESSING REGULAR UPPER BODY CLOTHING: A LITTLE
MOVING FROM LYING ON BACK TO SITTING ON SIDE OF FLAT BED WITH BEDRAILS: A LITTLE
PERSONAL GROOMING: A LITTLE

## 2025-02-06 ASSESSMENT — PAIN - FUNCTIONAL ASSESSMENT
PAIN_FUNCTIONAL_ASSESSMENT: 0-10

## 2025-02-06 NOTE — PROGRESS NOTES
Alyce Dougherty is a 84 y.o. female who was referred to the Clinical Pharmacy Team to complete a virtual Transitions of Care encounter for discharge medication optimization. The patient was referred for their HFrEF, COPD.    Attending: Chanelle Corado MD  PCP: Chanelle Corado MD    _______________________________________________________________________  PHARMACY ASSESSMENT    Home Pharmacy: [facility]   Meds to beds? yes    Affordability/Accessibility: Yes  Adherence/Organization: Son helps her keep them organized  Adverse Effects: no    No issues reported in regards to affordability, accessibility, organization, and adverse effects    Son and grandson at the pt's bedside, kirstie Layton answered most questions.   _______________________________________________________________________    Social History:  [+] Alcohol: no  [+] Caffeine: tea  [+] Tobacco: no  [+] Recreational Drugs: no  _______________________________________________________________________  CHRONIC HEART FAILURE ASSESSMENT  HTN present/diagnosed: yes    LVEF: 30-35% 5/17/23  eGFR: 6  Beta blocker: carvedilol  ACEi/ARB/ARNI: NO, lisinopril allergy  MRA: no, ESRD on dialisis  SGLT2i: no  Diuretic: no  Advanced therapies: isosorbide  _______________________________________________________________________  ASTHMA/COPD ASSESSMENT    CURRENT PHARMACOTHERAPY  Albuterol (doesn't use much)  Spiriva (not taking)    HISTORICAL PHARMACOTHERAPY  no    RESCUE INHALER USE  Not using     INHALER TECHNIQUE  Did not assess    SECONDARY PREVENTION  Influenza: no last 2023  Pneumococcal: no  RSV: no  COVID: yes, 2022    EXACERBATION HISTORY  When was your last hospitalization for an exacerbation? 2023  When was the last time you were treated with antibiotics and/or steroids? 1/20251070    SMOKING CESSATION  Patient is not currently using tobacco products  Quit Date: 1070  Years Smoking: couple years    OR    Patient is currently using tobacco products  PPD <1  Duration 2 or so  years    ___________________________________________________________________  PATIENT EDUCATION/GOALS  Education on COPD to help with compliance  Introduced post-discharge pharmacy team follow up and benefits of calls  Answered all patient questions and concerns to the best of my ability  _______________________________________________________________________  RECOMMENDATIONS/PLAN  Pt needs more education on her COPD, she has had multiple hospitalizations in the past  Pt needs education on vaccines and appropriateness for her  Continue all medications per medical team  Please send prescriptions to [facility] pharmacy for assistance on insurance prior authorization and co pay. Prescriptions will be delivered to the patient's bedside prior to discharge with the Meds to Beds program.   Continuity of care will be provided by PCP and clinical pharmacy team    Yung Harvey, PharmD  PGY-1 Resident    Verbal consent to manage patient's drug therapy was obtained from an individual authorized to act on behalf of the patient. They were informed they may decline to participate or withdraw from participation in pharmacy services at any time.

## 2025-02-06 NOTE — PROGRESS NOTES
02/06/25 1054   Discharge Planning   Expected Discharge Disposition Home     Patient will dc home with son and dil  Son provides transport to and from HD [facility] [place]  Son will transport home    Patient admitted for resp symptoms and cough  Renal following for HD  ID following for R pna- failed outpt oral abx, will need to watch for final ID plan, if needs abx with HD, will need to fax ID note to [facility]sen  [phone]    ADOD 2-3 days  BARRIERS final ID plan  DISPO home with family

## 2025-02-06 NOTE — CARE PLAN
Problem: Skin  Goal: Promote skin healing  Outcome: Progressing  Flowsheets (Taken 2/6/2025 1053)  Promote skin healing: Assess skin/pad under line(s)/device(s)   The patient's goals for the shift include wants to feel better and go home, have dialysis    The clinical goals for the shift include pt safety    Over the shift, the patient did not make progress toward the following goals. Barriers to progression include . Recommendations to address these barriers include .assess skin

## 2025-02-06 NOTE — CARE PLAN
The patient's goals for the shift include wants to feel better and go home, have dialysis    The clinical goals for the shift include pt safety    Problem: Pain - Adult  Goal: Verbalizes/displays adequate comfort level or baseline comfort level  Outcome: Progressing     Problem: Safety - Adult  Goal: Free from fall injury  Outcome: Progressing

## 2025-02-06 NOTE — PROGRESS NOTES
Nephrology Consult Progress Note    Admit Date: 2/4/2025    Interval history:  No acute events    CURRENT MEDICATIONS:    Current Facility-Administered Medications:     aspirin EC tablet 81 mg, 81 mg, oral, Daily, Chanelle Corado MD, 81 mg at 02/06/25 0833    atorvastatin (Lipitor) tablet 80 mg, 80 mg, oral, Daily, Chanelle Corado MD, 80 mg at 02/06/25 0833    azithromycin (Zithromax) tablet 500 mg, 500 mg, oral, q24h, Winifred Hayes, PharmD, 500 mg at 02/05/25 2011    benzonatate (Tessalon) capsule 100 mg, 100 mg, oral, TID PRN, Chanelle Corado MD, 100 mg at 02/04/25 2242    carvedilol (Coreg) tablet 6.25 mg, 6.25 mg, oral, BID, Chanelle Corado MD, 6.25 mg at 02/06/25 0833    cefTRIAXone (Rocephin) 2 g in dextrose 5% IV 50 mL, 2 g, intravenous, q24h, Chanelle Corado MD, Stopped at 02/05/25 2053    dextrose 50 % injection 25 g, 25 g, intravenous, Once, Mark Snider MD    gabapentin (Neurontin) capsule 100 mg, 100 mg, oral, TID, Chanelle Corado MD, 100 mg at 02/06/25 0833    heparin (porcine) injection 5,000 Units, 5,000 Units, subcutaneous, q8h CLEMENT, Chanelle Corado MD, 5,000 Units at 02/06/25 0432    [Held by provider] ipratropium (Atrovent) 0.02 % nebulizer solution 0.5 mg, 0.5 mg, nebulization, 4x daily, Chai Carlton, PharmD    ipratropium-albuteroL (Duo-Neb) 0.5-2.5 mg/3 mL nebulizer solution 3 mL, 3 mL, nebulization, TID, Chanelle Corado MD, 3 mL at 02/06/25 0711    ipratropium-albuteroL (Duo-Neb) 0.5-2.5 mg/3 mL nebulizer solution 3 mL, 3 mL, nebulization, q2h PRN, Chanelle Corado MD    isosorbide mononitrate ER (Imdur) 24 hr tablet 60 mg, 60 mg, oral, Daily, Chanelle Corado MD, 60 mg at 02/06/25 0833    lidocaine-prilocaine (Emla) cream, , Topical, Before Dialysis, Florencio Vizcaino MD    [Held by provider] magnesium oxide (Mag-Ox) tablet 400 mg, 400 mg, oral, Daily, Chanelle Corado MD    mirtazapine (Remeron) tablet 7.5 mg, 7.5 mg, oral, Nightly, Chanelle Corado MD, 7.5 mg at 02/05/25 2011    oxygen (O2) therapy, ,  "inhalation, Continuous - 02/gases, Chanelle Corado MD, Last Rate: 120,000 mL/hr at 02/06/25 0711, 2 L/min at 02/06/25 0711    sevelamer carbonate (Renvela) tablet 800 mg, 800 mg, oral, TID, Chanelle Corado MD, 800 mg at 02/06/25 0833       Intake/Output Summary (Last 24 hours) at 2/6/2025 1153  Last data filed at 2/6/2025 1140  Gross per 24 hour   Intake 1250 ml   Output 1700 ml   Net -450 ml       PHYSICAL EXAM:  /67 (BP Location: Right arm, Patient Position: Lying)   Pulse 69   Temp 36.1 °C (97 °F) (Temporal)   Resp 17   Ht 1.676 m (5' 6\")   Wt 54.9 kg (121 lb)   SpO2 99%   BMI 19.53 kg/m²     Intake/Output Summary (Last 24 hours) at 2/6/2025 1153  Last data filed at 2/6/2025 1140  Gross per 24 hour   Intake 1250 ml   Output 1700 ml   Net -450 ml     Gen: AAO, NAD, thin  Neck: No JVD  Cardiac: RRR  Resp: clear BS  Abd: Soft, non tender, +BS, non distended   Ext: No edema   Access: LUE AVF  Neuro: moves 4 ext  Peripheral Pulses: weak peripheral pulses.  Skin: Skin color, texture, turgor normal, no suspicious rashes or lesions.    Labs:  Results for orders placed or performed during the hospital encounter of 02/04/25 (from the past 24 hours)   CBC and Auto Differential   Result Value Ref Range    WBC 5.4 4.4 - 11.3 x10*3/uL    nRBC 0.0 0.0 - 0.0 /100 WBCs    RBC 4.31 4.00 - 5.20 x10*6/uL    Hemoglobin 12.1 12.0 - 16.0 g/dL    Hematocrit 37.5 36.0 - 46.0 %    MCV 87 80 - 100 fL    MCH 28.1 26.0 - 34.0 pg    MCHC 32.3 32.0 - 36.0 g/dL    RDW 17.8 (H) 11.5 - 14.5 %    Platelets 143 (L) 150 - 450 x10*3/uL    Neutrophils % 69.5 40.0 - 80.0 %    Immature Granulocytes %, Automated 0.4 0.0 - 0.9 %    Lymphocytes % 15.9 13.0 - 44.0 %    Monocytes % 9.4 2.0 - 10.0 %    Eosinophils % 3.9 0.0 - 6.0 %    Basophils % 0.9 0.0 - 2.0 %    Neutrophils Absolute 3.75 1.60 - 5.50 x10*3/uL    Immature Granulocytes Absolute, Automated 0.02 0.00 - 0.50 x10*3/uL    Lymphocytes Absolute 0.86 0.80 - 3.00 x10*3/uL    Monocytes " Absolute 0.51 0.05 - 0.80 x10*3/uL    Eosinophils Absolute 0.21 0.00 - 0.40 x10*3/uL    Basophils Absolute 0.05 0.00 - 0.10 x10*3/uL   Basic Metabolic Panel   Result Value Ref Range    Glucose 113 (H) 74 - 99 mg/dL    Sodium 132 (L) 136 - 145 mmol/L    Potassium 3.9 3.5 - 5.3 mmol/L    Chloride 94 (L) 98 - 107 mmol/L    Bicarbonate 26 21 - 32 mmol/L    Anion Gap 16 10 - 20 mmol/L    Urea Nitrogen 47 (H) 6 - 23 mg/dL    Creatinine 9.35 (H) 0.50 - 1.05 mg/dL    eGFR 4 (L) >60 mL/min/1.73m*2    Calcium 9.1 8.6 - 10.3 mg/dL   Procalcitonin   Result Value Ref Range    Procalcitonin 50.42 (H) <=0.07 ng/mL   Magnesium   Result Value Ref Range    Magnesium 2.63 (H) 1.60 - 2.40 mg/dL   Basic Metabolic Panel   Result Value Ref Range    Glucose 79 74 - 99 mg/dL    Sodium 134 (L) 136 - 145 mmol/L    Potassium 3.7 3.5 - 5.3 mmol/L    Chloride 96 (L) 98 - 107 mmol/L    Bicarbonate 28 21 - 32 mmol/L    Anion Gap 14 10 - 20 mmol/L    Urea Nitrogen 25 (H) 6 - 23 mg/dL    Creatinine 6.43 (H) 0.50 - 1.05 mg/dL    eGFR 6 (L) >60 mL/min/1.73m*2    Calcium 8.8 8.6 - 10.3 mg/dL   CBC and Auto Differential   Result Value Ref Range    WBC 4.5 4.4 - 11.3 x10*3/uL    nRBC 0.0 0.0 - 0.0 /100 WBCs    RBC 4.18 4.00 - 5.20 x10*6/uL    Hemoglobin 11.7 (L) 12.0 - 16.0 g/dL    Hematocrit 37.3 36.0 - 46.0 %    MCV 89 80 - 100 fL    MCH 28.0 26.0 - 34.0 pg    MCHC 31.4 (L) 32.0 - 36.0 g/dL    RDW 18.3 (H) 11.5 - 14.5 %    Platelets 118 (L) 150 - 450 x10*3/uL    Neutrophils % 69.8 40.0 - 80.0 %    Immature Granulocytes %, Automated 0.7 0.0 - 0.9 %    Lymphocytes % 13.7 13.0 - 44.0 %    Monocytes % 10.9 2.0 - 10.0 %    Eosinophils % 4.2 0.0 - 6.0 %    Basophils % 0.7 0.0 - 2.0 %    Neutrophils Absolute 3.15 1.60 - 5.50 x10*3/uL    Immature Granulocytes Absolute, Automated 0.03 0.00 - 0.50 x10*3/uL    Lymphocytes Absolute 0.62 (L) 0.80 - 3.00 x10*3/uL    Monocytes Absolute 0.49 0.05 - 0.80 x10*3/uL    Eosinophils Absolute 0.19 0.00 - 0.40 x10*3/uL     Basophils Absolute 0.03 0.00 - 0.10 x10*3/uL     *Note: Due to a large number of results and/or encounters for the requested time period, some results have not been displayed. A complete set of results can be found in Results Review.        DATA:   Diagnostic tests reviewed for today's visit:    New labs and imaging     Assessment and Plan:  Came with SOB and PNA  - ESKD on HD: euvolemic, last HD yesterday   Next HD tomorrow keeping MWF schedule  HTN: controlled  Hb 11    Will continue to follow.     Signature: Florencio Vizcaino MD

## 2025-02-06 NOTE — PROGRESS NOTES
For follow-up of:  Evaluation and management of pneumonia    Subjective   Interval History:  She is breathing adequately on 2 L/min nasal cannula oxygen.  Her family member is helping her eat dinner.  He says that she has twitching of her lips and also has an occasional tremor that are new with jerking movements of her upper extremities       Objective     Current Facility-Administered Medications   Medication Dose Route Frequency Provider Last Rate Last Admin    aspirin EC tablet 81 mg  81 mg oral Daily Chanelle Corado MD   81 mg at 02/06/25 0833    atorvastatin (Lipitor) tablet 80 mg  80 mg oral Daily Chanelle Corado MD   80 mg at 02/06/25 0833    azithromycin (Zithromax) tablet 500 mg  500 mg oral q24h Winifred Hayes PharmD   500 mg at 02/05/25 2011    benzonatate (Tessalon) capsule 100 mg  100 mg oral TID PRN Chanelle Corado MD   100 mg at 02/04/25 2242    carvedilol (Coreg) tablet 6.25 mg  6.25 mg oral BID Chanelle Corado MD   6.25 mg at 02/06/25 0833    cefTRIAXone (Rocephin) 2 g in dextrose 5% IV 50 mL  2 g intravenous q24h Chanelle Corado MD   Stopped at 02/05/25 2053    dextrose 50 % injection 25 g  25 g intravenous Once Mark Snider MD        gabapentin (Neurontin) capsule 100 mg  100 mg oral TID Chanelle Corado MD   100 mg at 02/06/25 1411    heparin (porcine) injection 5,000 Units  5,000 Units subcutaneous q8h CLEMENT Chanelle Corado MD   5,000 Units at 02/06/25 1359    [Held by provider] ipratropium (Atrovent) 0.02 % nebulizer solution 0.5 mg  0.5 mg nebulization 4x daily Chai Carlton, PharmD        ipratropium-albuteroL (Duo-Neb) 0.5-2.5 mg/3 mL nebulizer solution 3 mL  3 mL nebulization TID Chanelle Corado MD   3 mL at 02/06/25 0711    ipratropium-albuteroL (Duo-Neb) 0.5-2.5 mg/3 mL nebulizer solution 3 mL  3 mL nebulization q2h PRN Chanelle Corado MD        isosorbide mononitrate ER (Imdur) 24 hr tablet 60 mg  60 mg oral Daily Chanelle Corado MD   60 mg at 02/06/25 7878    lidocaine-prilocaine (Emla)  cream   Topical Before Dialysis Florencio Vizcaino MD        [Held by provider] magnesium oxide (Mag-Ox) tablet 400 mg  400 mg oral Daily Chanelle Corado MD        mirtazapine (Remeron) tablet 7.5 mg  7.5 mg oral Nightly Chanelle Corado MD   7.5 mg at 02/05/25 2011    oxygen (O2) therapy   inhalation Continuous - 02/gases Chanelle Corado ,000 mL/hr at 02/06/25 0711 2 L/min at 02/06/25 0711    sevelamer carbonate (Renvela) tablet 800 mg  800 mg oral TID Chanelle Corado MD   800 mg at 02/06/25 1719        Last Recorded Vitals  /63 (BP Location: Right arm, Patient Position: Lying)   Pulse 78   Temp 36.1 °C (97 °F) (Temporal)   Resp 17   Wt 54.9 kg (121 lb)   SpO2 99%   General: no acute distress, lying in bed, awake, answers questions, a little confused, occasional jerks of her arms  HEENT: pharynx not examined  CVS: RRR  Resp: decreased breath sounds in bases, wearing nasal cannula oxygen  ABD: soft, NT, ND  EXT: no edema  Skin: no rash     Relevant Results:    Labs:   Results from last 72 hours   Lab Units 02/06/25  0611 02/05/25  1310 02/04/25  1444   SODIUM mmol/L 134* 132* 133*   POTASSIUM mmol/L 3.7 3.9 4.1   CHLORIDE mmol/L 96* 94* 95*   BUN mg/dL 25* 47* 42*   CREATININE mg/dL 6.43* 9.35* 8.47*   MAGNESIUM mg/dL 2.42* 2.63* 2.71*     Results from last 72 hours   Lab Units 02/06/25  0612 02/05/25  1310 02/04/25  1444   WBC AUTO x10*3/uL 4.5 5.4 5.5   HEMOGLOBIN g/dL 11.7* 12.1 12.4   HEMATOCRIT % 37.3 37.5 38.3   PLATELETS AUTO x10*3/uL 118* 143* 155       Microbiology data: I have personally and independently reviewed and intrepreted the lab results  2/4/2025 MRSA swab negative  2/4/2025 blood culture show no growth    Imaging data: I have personally and independently reviewed and interpreted the imaging studies  2/4/2025 chest x-ray shows right-sided consolidation     Assessment/Plan     MY IMPRESSION & RECOMMENDATIONS:  Right pneumonia, being treated with IV antibiotics.  I have personally and  independently reviewed and interpreted the laboratory tests, imaging studies, and the documentation from other healthcare providers.  I am monitoring for side effects from ceftriaxone and azithromycin as outlined in a previous note.  Her acute hypoxic respiratory failure is being managed with oxygen supplementation.  Her prognosis remains uncertain especially since she failed outpatient treatment with oral antibiotics.     -Continue ceftriaxone and azithromycin for now  -Await procalcitonin result     Other issues:  #Jerking movements, which we can monitor  #Hypertension  #Hyperlipidemia  #End-stage renal disease on dialysis, which can affect the dosing of antimicrobials  #HFrEF  #PAD  #GERD  #Anemia of chronic disease  #Breast cancer status postlumpectomy  #Osteoarthritis  #History of pelvic fractures       Juan Alberto Sood MD

## 2025-02-06 NOTE — PROGRESS NOTES
Alyce Dougherty is a 84 y.o. female     Patient feels better compared to yesterday  Responding well to antibiotics    Review of Systems     Constitutional: no fever, no chills,   Cardiovascular: no chest pain   Respiratory: no cough, wheezing or shortness of breath a  Gastrointestinal: no abdominal pain, no constipation, no melena, no nausea, no diarrhea, no vomiting and no blood in stools.   Neurological: no headache,   All other systems have been reviewed and are negative for complaint.       Vitals:    02/06/25 1533   BP: 121/63   Pulse: 78   Resp: 17   Temp: 36.1 °C (97 °F)   SpO2: 99%        Scheduled medications  aspirin, 81 mg, oral, Daily  atorvastatin, 80 mg, oral, Daily  azithromycin, 500 mg, oral, q24h  carvedilol, 6.25 mg, oral, BID  cefTRIAXone, 2 g, intravenous, q24h  dextrose, 25 g, intravenous, Once  gabapentin, 100 mg, oral, TID  heparin (porcine), 5,000 Units, subcutaneous, q8h CLEMENT  [Held by provider] ipratropium, 0.5 mg, nebulization, 4x daily  ipratropium-albuteroL, 3 mL, nebulization, TID  isosorbide mononitrate ER, 60 mg, oral, Daily  lidocaine-prilocaine, , Topical, Before Dialysis  [Held by provider] magnesium oxide, 400 mg, oral, Daily  mirtazapine, 7.5 mg, oral, Nightly  oxygen, , inhalation, Continuous - 02/gases  sevelamer carbonate, 800 mg, oral, TID      Continuous medications     PRN medications  PRN medications: benzonatate, ipratropium-albuteroL    Lab Review   Results from last 7 days   Lab Units 02/06/25  0612 02/05/25  1310 02/04/25  1444   WBC AUTO x10*3/uL 4.5 5.4 5.5   HEMOGLOBIN g/dL 11.7* 12.1 12.4   HEMATOCRIT % 37.3 37.5 38.3   PLATELETS AUTO x10*3/uL 118* 143* 155     Results from last 7 days   Lab Units 02/06/25  0611 02/05/25  1310 02/04/25  1444   SODIUM mmol/L 134* 132* 133*   POTASSIUM mmol/L 3.7 3.9 4.1   CHLORIDE mmol/L 96* 94* 95*   CO2 mmol/L 28 26 25   BUN mg/dL 25* 47* 42*   CREATININE mg/dL 6.43* 9.35* 8.47*   CALCIUM mg/dL 8.8 9.1 9.7   PROTEIN TOTAL g/dL  --    --  7.2   BILIRUBIN TOTAL mg/dL  --   --  0.8   ALK PHOS U/L  --   --  221*   ALT U/L  --   --  3*   AST U/L  --   --  17   GLUCOSE mg/dL 79 113* 64*     Results from last 7 days   Lab Units 02/04/25  1622 02/04/25  1444   TROPHS ng/L 27* 30*        XR chest 2 views   Final Result   Right basilar consolidation slightly worsened from prior with slight   interval increase of a right-sided parapneumonic effusion.        Signed by: Chandu Pulido 2/4/2025 6:30 PM   Dictation workstation:   [id number]            Physical Exam    Constitutional   General appearance: Alert and in no acute distress.   Pulmonary   Respiratory assessment: No respiratory distress, normal respiratory rhythm and effort.    Auscultation of Lungs: Faint rhonchi  Cardiovascular   Auscultation of heart: Apical pulse normal, heart rate and rhythm normal, normal S1 and S2, no murmurs and no pericardial rub.    Exam for edema: No peripheral edema.   Abdomen   Abdominal Exam: No bruits, normal bowel sounds, soft, non-tender, no abdominal mass palpated.    Liver and Spleen exam: No hepato-splenomegaly.   Musculoskeletal     Inspection/palpation of joints, bones and muscles: No joint swelling. Normal movement of all extremities.   Neurologic   Cranial nerves: Nerves 2-12 were intact, no focal neuro defects.         Assessment/Plan      #Community-acquired pneumonia  Continue IV Rocephin and Zithromax  Patient is beginning to feel better  We will reassess tomorrow and decide on home antibiotics     #End-stage renal disease  Hemodialysis tomorrow morning     #Hypertension  Continue home medications     #Dyslipidemia  Continue home medications

## 2025-02-07 ENCOUNTER — APPOINTMENT (OUTPATIENT)
Dept: DIALYSIS | Facility: HOSPITAL | Age: 85
End: 2025-02-07
Payer: COMMERCIAL

## 2025-02-07 ENCOUNTER — APPOINTMENT (OUTPATIENT)
Dept: RADIOLOGY | Facility: HOSPITAL | Age: 85
DRG: 193 | End: 2025-02-07
Payer: MEDICARE

## 2025-02-07 LAB
ANION GAP SERPL CALC-SCNC: 14 MMOL/L (ref 10–20)
BASOPHILS # BLD AUTO: 0.03 X10*3/UL (ref 0–0.1)
BASOPHILS NFR BLD AUTO: 0.2 %
BUN SERPL-MCNC: 31 MG/DL (ref 6–23)
CALCIUM SERPL-MCNC: 9.1 MG/DL (ref 8.6–10.3)
CHLORIDE SERPL-SCNC: 97 MMOL/L (ref 98–107)
CO2 SERPL-SCNC: 28 MMOL/L (ref 21–32)
CREAT SERPL-MCNC: 7.42 MG/DL (ref 0.5–1.05)
EGFRCR SERPLBLD CKD-EPI 2021: 5 ML/MIN/1.73M*2
EOSINOPHIL # BLD AUTO: 0.09 X10*3/UL (ref 0–0.4)
EOSINOPHIL NFR BLD AUTO: 0.7 %
ERYTHROCYTE [DISTWIDTH] IN BLOOD BY AUTOMATED COUNT: 18 % (ref 11.5–14.5)
GLUCOSE BLD MANUAL STRIP-MCNC: 105 MG/DL (ref 74–99)
GLUCOSE BLD MANUAL STRIP-MCNC: 106 MG/DL (ref 74–99)
GLUCOSE BLD MANUAL STRIP-MCNC: 109 MG/DL (ref 74–99)
GLUCOSE BLD MANUAL STRIP-MCNC: 60 MG/DL (ref 74–99)
GLUCOSE BLD MANUAL STRIP-MCNC: 61 MG/DL (ref 74–99)
GLUCOSE BLD MANUAL STRIP-MCNC: 96 MG/DL (ref 74–99)
GLUCOSE SERPL-MCNC: 64 MG/DL (ref 74–99)
HCT VFR BLD AUTO: 34.5 % (ref 36–46)
HGB BLD-MCNC: 11.1 G/DL (ref 12–16)
IMM GRANULOCYTES # BLD AUTO: 0.09 X10*3/UL (ref 0–0.5)
IMM GRANULOCYTES NFR BLD AUTO: 0.7 % (ref 0–0.9)
LYMPHOCYTES # BLD AUTO: 0.87 X10*3/UL (ref 0.8–3)
LYMPHOCYTES NFR BLD AUTO: 6.5 %
MCH RBC QN AUTO: 28.1 PG (ref 26–34)
MCHC RBC AUTO-ENTMCNC: 32.2 G/DL (ref 32–36)
MCV RBC AUTO: 87 FL (ref 80–100)
MONOCYTES # BLD AUTO: 0.97 X10*3/UL (ref 0.05–0.8)
MONOCYTES NFR BLD AUTO: 7.2 %
NEUTROPHILS # BLD AUTO: 11.42 X10*3/UL (ref 1.6–5.5)
NEUTROPHILS NFR BLD AUTO: 84.7 %
NRBC BLD-RTO: 0 /100 WBCS (ref 0–0)
PLATELET # BLD AUTO: 123 X10*3/UL (ref 150–450)
POTASSIUM SERPL-SCNC: 3.9 MMOL/L (ref 3.5–5.3)
RBC # BLD AUTO: 3.95 X10*6/UL (ref 4–5.2)
SODIUM SERPL-SCNC: 135 MMOL/L (ref 136–145)
WBC # BLD AUTO: 13.5 X10*3/UL (ref 4.4–11.3)

## 2025-02-07 PROCEDURE — 99232 SBSQ HOSP IP/OBS MODERATE 35: CPT | Performed by: INTERNAL MEDICINE

## 2025-02-07 PROCEDURE — 2500000005 HC RX 250 GENERAL PHARMACY W/O HCPCS: Performed by: INTERNAL MEDICINE

## 2025-02-07 PROCEDURE — 36415 COLL VENOUS BLD VENIPUNCTURE: CPT | Performed by: INTERNAL MEDICINE

## 2025-02-07 PROCEDURE — 94640 AIRWAY INHALATION TREATMENT: CPT

## 2025-02-07 PROCEDURE — 71045 X-RAY EXAM CHEST 1 VIEW: CPT

## 2025-02-07 PROCEDURE — 71045 X-RAY EXAM CHEST 1 VIEW: CPT | Performed by: RADIOLOGY

## 2025-02-07 PROCEDURE — 2500000001 HC RX 250 WO HCPCS SELF ADMINISTERED DRUGS (ALT 637 FOR MEDICARE OP): Performed by: PHARMACIST

## 2025-02-07 PROCEDURE — 8010000001 HC DIALYSIS - HEMODIALYSIS PER DAY

## 2025-02-07 PROCEDURE — 9420000001 HC RT PATIENT EDUCATION 5 MIN

## 2025-02-07 PROCEDURE — 2500000004 HC RX 250 GENERAL PHARMACY W/ HCPCS (ALT 636 FOR OP/ED): Performed by: INTERNAL MEDICINE

## 2025-02-07 PROCEDURE — 80048 BASIC METABOLIC PNL TOTAL CA: CPT | Performed by: INTERNAL MEDICINE

## 2025-02-07 PROCEDURE — 85025 COMPLETE CBC W/AUTO DIFF WBC: CPT | Performed by: INTERNAL MEDICINE

## 2025-02-07 PROCEDURE — 90935 HEMODIALYSIS ONE EVALUATION: CPT | Performed by: INTERNAL MEDICINE

## 2025-02-07 PROCEDURE — 82947 ASSAY GLUCOSE BLOOD QUANT: CPT

## 2025-02-07 PROCEDURE — 2500000002 HC RX 250 W HCPCS SELF ADMINISTERED DRUGS (ALT 637 FOR MEDICARE OP, ALT 636 FOR OP/ED): Performed by: INTERNAL MEDICINE

## 2025-02-07 PROCEDURE — 2500000001 HC RX 250 WO HCPCS SELF ADMINISTERED DRUGS (ALT 637 FOR MEDICARE OP): Performed by: INTERNAL MEDICINE

## 2025-02-07 PROCEDURE — 1200000002 HC GENERAL ROOM WITH TELEMETRY DAILY

## 2025-02-07 RX ORDER — DEXTROSE 50 % IN WATER (D50W) INTRAVENOUS SYRINGE
12.5
Status: ACTIVE | OUTPATIENT
Start: 2025-02-07

## 2025-02-07 RX ORDER — DEXTROSE 50 % IN WATER (D50W) INTRAVENOUS SYRINGE
25
Status: ACTIVE | OUTPATIENT
Start: 2025-02-07

## 2025-02-07 RX ADMIN — GABAPENTIN 100 MG: 100 CAPSULE ORAL at 21:04

## 2025-02-07 RX ADMIN — HEPARIN SODIUM 5000 UNITS: 5000 INJECTION INTRAVENOUS; SUBCUTANEOUS at 14:00

## 2025-02-07 RX ADMIN — IPRATROPIUM BROMIDE AND ALBUTEROL SULFATE 3 ML: 2.5; .5 SOLUTION RESPIRATORY (INHALATION) at 12:24

## 2025-02-07 RX ADMIN — CARVEDILOL 6.25 MG: 6.25 TABLET, FILM COATED ORAL at 13:47

## 2025-02-07 RX ADMIN — IPRATROPIUM BROMIDE AND ALBUTEROL SULFATE 3 ML: 2.5; .5 SOLUTION RESPIRATORY (INHALATION) at 19:43

## 2025-02-07 RX ADMIN — Medication 2 L/MIN: at 12:24

## 2025-02-07 RX ADMIN — MIRTAZAPINE 7.5 MG: 15 TABLET, FILM COATED ORAL at 21:04

## 2025-02-07 RX ADMIN — SEVELAMER CARBONATE 800 MG: 800 TABLET, FILM COATED ORAL at 13:47

## 2025-02-07 RX ADMIN — CARVEDILOL 6.25 MG: 6.25 TABLET, FILM COATED ORAL at 21:04

## 2025-02-07 RX ADMIN — CEFTRIAXONE 2 G: 2 INJECTION, POWDER, FOR SOLUTION INTRAMUSCULAR; INTRAVENOUS at 21:05

## 2025-02-07 RX ADMIN — ATORVASTATIN CALCIUM 80 MG: 80 TABLET, FILM COATED ORAL at 13:47

## 2025-02-07 RX ADMIN — ISOSORBIDE MONONITRATE 60 MG: 30 TABLET, EXTENDED RELEASE ORAL at 13:52

## 2025-02-07 RX ADMIN — GABAPENTIN 100 MG: 100 CAPSULE ORAL at 13:47

## 2025-02-07 RX ADMIN — HEPARIN SODIUM 5000 UNITS: 5000 INJECTION INTRAVENOUS; SUBCUTANEOUS at 21:04

## 2025-02-07 RX ADMIN — SEVELAMER CARBONATE 800 MG: 800 TABLET, FILM COATED ORAL at 16:00

## 2025-02-07 RX ADMIN — ASPIRIN 81 MG: 81 TABLET, COATED ORAL at 13:47

## 2025-02-07 RX ADMIN — AZITHROMYCIN DIHYDRATE 500 MG: 500 TABLET ORAL at 21:04

## 2025-02-07 ASSESSMENT — COGNITIVE AND FUNCTIONAL STATUS - GENERAL
CLIMB 3 TO 5 STEPS WITH RAILING: A LOT
PERSONAL GROOMING: A LOT
EATING MEALS: A LOT
DRESSING REGULAR LOWER BODY CLOTHING: A LOT
MOBILITY SCORE: 12
TOILETING: A LOT
MOVING TO AND FROM BED TO CHAIR: A LOT
WALKING IN HOSPITAL ROOM: A LOT
DRESSING REGULAR UPPER BODY CLOTHING: A LOT
STANDING UP FROM CHAIR USING ARMS: A LOT
DAILY ACTIVITIY SCORE: 12
HELP NEEDED FOR BATHING: A LOT
TURNING FROM BACK TO SIDE WHILE IN FLAT BAD: A LOT
MOVING FROM LYING ON BACK TO SITTING ON SIDE OF FLAT BED WITH BEDRAILS: A LOT

## 2025-02-07 ASSESSMENT — PAIN - FUNCTIONAL ASSESSMENT
PAIN_FUNCTIONAL_ASSESSMENT: 0-10
PAIN_FUNCTIONAL_ASSESSMENT: 0-10

## 2025-02-07 ASSESSMENT — PAIN SCALES - GENERAL
PAINLEVEL_OUTOF10: 0 - NO PAIN

## 2025-02-07 NOTE — PROGRESS NOTES
Alyce Dougherty is a 84 y.o. female     Patient appears quite weak today  Had tremors when trying to feed herself and has not eaten much  Has had low blood sugars  Discussed with patient's son at bedside  Will get PT OT consulted and will likely need rehab for deconditioning    Review of Systems     Constitutional: no fever, no chills,   Cardiovascular: no chest pain   Respiratory: no cough, wheezing or shortness of breath a  Gastrointestinal: no abdominal pain, no constipation, no melena, no nausea, no diarrhea, no vomiting and no blood in stools.   Neurological: no headache,   All other systems have been reviewed and are negative for complaint.       Vitals:    02/07/25 1616   BP: 114/60   Pulse: 76   Resp: 17   Temp: 36.8 °C (98.2 °F)   SpO2: 95%        Scheduled medications  aspirin, 81 mg, oral, Daily  atorvastatin, 80 mg, oral, Daily  azithromycin, 500 mg, oral, q24h  carvedilol, 6.25 mg, oral, BID  cefTRIAXone, 2 g, intravenous, q24h  dextrose, 25 g, intravenous, Once  gabapentin, 100 mg, oral, TID  heparin (porcine), 5,000 Units, subcutaneous, q8h CLEMENT  [Held by provider] ipratropium, 0.5 mg, nebulization, 4x daily  ipratropium-albuteroL, 3 mL, nebulization, TID  isosorbide mononitrate ER, 60 mg, oral, Daily  lidocaine-prilocaine, , Topical, Before Dialysis  [Held by provider] magnesium oxide, 400 mg, oral, Daily  mirtazapine, 7.5 mg, oral, Nightly  oxygen, , inhalation, Continuous - 02/gases  sevelamer carbonate, 800 mg, oral, TID      Continuous medications     PRN medications  PRN medications: benzonatate, ipratropium-albuteroL    Lab Review   Results from last 7 days   Lab Units 02/07/25  0554 02/06/25  0612 02/05/25  1310   WBC AUTO x10*3/uL 13.5* 4.5 5.4   HEMOGLOBIN g/dL 11.1* 11.7* 12.1   HEMATOCRIT % 34.5* 37.3 37.5   PLATELETS AUTO x10*3/uL 123* 118* 143*     Results from last 7 days   Lab Units 02/07/25  0554 02/06/25  0611 02/05/25  1310 02/04/25  1444   SODIUM mmol/L 135* 134* 132* 133*    POTASSIUM mmol/L 3.9 3.7 3.9 4.1   CHLORIDE mmol/L 97* 96* 94* 95*   CO2 mmol/L 28 28 26 25   BUN mg/dL 31* 25* 47* 42*   CREATININE mg/dL 7.42* 6.43* 9.35* 8.47*   CALCIUM mg/dL 9.1 8.8 9.1 9.7   PROTEIN TOTAL g/dL  --   --   --  7.2   BILIRUBIN TOTAL mg/dL  --   --   --  0.8   ALK PHOS U/L  --   --   --  221*   ALT U/L  --   --   --  3*   AST U/L  --   --   --  17   GLUCOSE mg/dL 64* 79 113* 64*     Results from last 7 days   Lab Units 02/04/25  1622 02/04/25  1444   TROPHS ng/L 27* 30*        XR chest 1 view   Final Result   1.  Persistent bibasilar infiltrates and effusions for which   follow-up is recommended.             MACRO:   None        Signed by: Robe Alberto 2/7/2025 1:59 PM   Dictation workstation:   [id number]      XR chest 2 views   Final Result   Right basilar consolidation slightly worsened from prior with slight   interval increase of a right-sided parapneumonic effusion.        Signed by: Chandu Pulido 2/4/2025 6:30 PM   Dictation workstation:   [id number]            Physical Exam    Constitutional   General appearance: Alert and in no acute distress.   Pulmonary   Respiratory assessment: No respiratory distress, normal respiratory rhythm and effort.    Auscultation of Lungs: Faint rhonchi  Cardiovascular   Auscultation of heart: Apical pulse normal, heart rate and rhythm normal, normal S1 and S2, no murmurs and no pericardial rub.    Exam for edema: No peripheral edema.   Abdomen   Abdominal Exam: No bruits, normal bowel sounds, soft, non-tender, no abdominal mass palpated.    Liver and Spleen exam: No hepato-splenomegaly.   Musculoskeletal     Inspection/palpation of joints, bones and muscles: No joint swelling. Normal movement of all extremities.   Neurologic   Cranial nerves: Nerves 2-12 were intact, no focal neuro defects.         Assessment/Plan      #Community-acquired pneumonia  Continue IV Rocephin and Zithromax  Rising white count  #Myoclonus/tremors  Suspect deconditioning from the  infection    #Deconditioning  Will get PT OT consult as she likely needs rehab facility for therapy     #End-stage renal disease  Hemodialysis tomorrow morning     #Hypertension  Continue home medications     #Dyslipidemia  Continue home medications

## 2025-02-07 NOTE — CARE PLAN
The patient's goals for the shift include wants to feel better and go home, have dialysis    The clinical goals for the shift include pt safety    Problem: Safety - Adult  Goal: Free from fall injury  Outcome: Progressing

## 2025-02-07 NOTE — PROGRESS NOTES
For follow-up of:  Evaluation and management of pneumonia    Subjective   Interval History:  She complains of general weakness.  She has had 1 loose bowel movement today.  She is breathing decently on 2 L/min nasal cannula oxygen.  She has no fevers or rash     Objective     Current Facility-Administered Medications   Medication Dose Route Frequency Provider Last Rate Last Admin    aspirin EC tablet 81 mg  81 mg oral Daily Chanelle Corado MD   81 mg at 02/07/25 1347    atorvastatin (Lipitor) tablet 80 mg  80 mg oral Daily Chanelle Corado MD   80 mg at 02/07/25 1347    azithromycin (Zithromax) tablet 500 mg  500 mg oral q24h Winifred Hayes, Esha   500 mg at 02/06/25 2035    benzonatate (Tessalon) capsule 100 mg  100 mg oral TID PRN Chanelle Corado MD   100 mg at 02/04/25 2242    carvedilol (Coreg) tablet 6.25 mg  6.25 mg oral BID Chanelle Corado MD   6.25 mg at 02/07/25 1347    cefTRIAXone (Rocephin) 2 g in dextrose 5% IV 50 mL  2 g intravenous q24h Chanelle Corado MD   Stopped at 02/06/25 2043    dextrose 50 % injection 12.5 g  12.5 g intravenous q15 min PRN Chanelle Corado MD        dextrose 50 % injection 25 g  25 g intravenous Once Mark Snider MD        dextrose 50 % injection 25 g  25 g intravenous q15 min PRN Chanelle Corado MD        gabapentin (Neurontin) capsule 100 mg  100 mg oral TID Chanelle Corado MD   100 mg at 02/07/25 1347    glucagon (Glucagen) injection 1 mg  1 mg intramuscular q15 min PRN Chanelle Corado MD        glucagon (Glucagen) injection 1 mg  1 mg intramuscular q15 min PRN Chanelle Corado MD        heparin (porcine) injection 5,000 Units  5,000 Units subcutaneous q8h CLEMENT Chanelle Corado MD   5,000 Units at 02/07/25 1400    [Held by provider] ipratropium (Atrovent) 0.02 % nebulizer solution 0.5 mg  0.5 mg nebulization 4x daily Chai Carlton, PharmD        ipratropium-albuteroL (Duo-Neb) 0.5-2.5 mg/3 mL nebulizer solution 3 mL  3 mL nebulization TID Chanelle Corado MD   3 mL at 02/07/25 4560     ipratropium-albuteroL (Duo-Neb) 0.5-2.5 mg/3 mL nebulizer solution 3 mL  3 mL nebulization q2h PRN Chanelle Corado MD        isosorbide mononitrate ER (Imdur) 24 hr tablet 60 mg  60 mg oral Daily Chaenlle Corado MD   60 mg at 02/07/25 1352    lidocaine-prilocaine (Emla) cream   Topical Before Dialysis Florencio Vizcaino MD        [Held by provider] magnesium oxide (Mag-Ox) tablet 400 mg  400 mg oral Daily Chanelle Corado MD        mirtazapine (Remeron) tablet 7.5 mg  7.5 mg oral Nightly Chanelle Corado MD   7.5 mg at 02/06/25 2035    oxygen (O2) therapy   inhalation Continuous - 02/gases Chanelle Corado ,000 mL/hr at 02/07/25 1224 2 L/min at 02/07/25 1224    sevelamer carbonate (Renvela) tablet 800 mg  800 mg oral TID Chanelle Corado MD   800 mg at 02/07/25 1347        Last Recorded Vitals  /60 (BP Location: Right arm, Patient Position: Lying)   Pulse 76   Temp 36.8 °C (98.2 °F) (Temporal)   Resp 17   Wt 54.9 kg (121 lb)   SpO2 95%   General: no acute distress, lying in bed, awake, answers questions  HEENT: pharynx not examined  CVS: RRR  Resp: decreased breath sounds in bases, wearing nasal cannula oxygen  ABD: soft, NT, ND  EXT: no edema  Skin: no rash     Relevant Results:    Labs:   Results from last 72 hours   Lab Units 02/07/25  0554 02/06/25  0611 02/05/25  1310   SODIUM mmol/L 135* 134* 132*   POTASSIUM mmol/L 3.9 3.7 3.9   CHLORIDE mmol/L 97* 96* 94*   BUN mg/dL 31* 25* 47*   CREATININE mg/dL 7.42* 6.43* 9.35*   MAGNESIUM mg/dL  --  2.42* 2.63*     Results from last 72 hours   Lab Units 02/07/25  0554 02/06/25  0612 02/05/25  1310   WBC AUTO x10*3/uL 13.5* 4.5 5.4   HEMOGLOBIN g/dL 11.1* 11.7* 12.1   HEMATOCRIT % 34.5* 37.3 37.5   PLATELETS AUTO x10*3/uL 123* 118* 143*       Microbiology data: I have personally and independently reviewed and intrepreted the lab results  2/4/2025 MRSA swab negative  2/4/2025 blood culture show no growth  2/5/2025 procalcitonin 37.28    Imaging data: I have personally  and independently reviewed and interpreted the imaging studies  2/4/2025 chest x-ray shows right-sided consolidation     Assessment/Plan     MY IMPRESSION & RECOMMENDATIONS:  Right pneumonia, for which she remains on IV antibiotics.  I have personally and independently reviewed and interpreted the laboratory tests, imaging studies, and the documentation from other healthcare providers.  I am monitoring for side effects from ceftriaxone and azithromycin as outlined in a previous note.  She remains on oxygen supplementation for her acute hypoxic respiratory failure.  She has a new leukocytosis today.  She did have 1 loose bowel movement so we will need to monitor her stool output closely in case she is developing a condition such as C. difficile.      -Continue ceftriaxone and azithromycin for now  -CBC tomorrow  -Monitor stool output     Other issues:  #Jerking movements, which are being monitored  #Hypertension  #Hyperlipidemia  #End-stage renal disease on dialysis, which can affect the dosing of antimicrobials  #HFrEF  #PAD  #GERD  #Anemia of chronic disease  #Breast cancer status postlumpectomy  #Osteoarthritis  #History of pelvic fractures       Juan Alberto Sood MD

## 2025-02-07 NOTE — PROGRESS NOTES
"Hemodialysis Note    Patient seen and examined while on dialysis, recent events, labs, medications reviewed.   Tolerating well  BP (!) 100/37 (BP Location: Right arm, Patient Position: Lying) Comment: RN notified  Pulse 66   Temp 36.4 °C (97.5 °F)   Resp 17   Ht 1.676 m (5' 6\")   Wt 54.9 kg (121 lb)   SpO2 100%   BMI 19.53 kg/m²      Using LUE AVF  Hb 11.1    HD now, next session Monday     Will continue to follow, overall management per primary team, and continue regular dialysis.      Florencio Vizcaino MD  Division of Nephrology and Hypertension    "

## 2025-02-07 NOTE — POST-PROCEDURE NOTE
Report to Receiving RN:    Report To: Jaylin ARCE  Time Report Called: 1200  Hand-Off Communication: stable psot hd 1.5 liters net removed no issues   Complications During Treatment: No  Ultrafiltration Treatment: No  Medications Administered During Dialysis: No  Blood Products Administered During Dialysis: No  Labs Sent During Dialysis: No  Heparin Drip Rate Changes: No  Dialysis Catheter Dressing: na avg  Last Dressing Change: na     Electronic Signatures:  Philippe Jin RN  (Signed)   Authored:    (Signed )   Authored:     Last Updated: 12:02 PM by PHILIPPE JIN

## 2025-02-07 NOTE — PRE-PROCEDURE NOTE
Report from Sending RN:    Report From: Princess and charge nurse   Recent Surgery of Procedure: No  Baseline Level of Consciousness (LOC): x3  Oxygen Use: Yes  Type: ns 4 liters   Diabetic: Yes  Last BP Med Given Day of Dialysis: see emar   Last Pain Med Given: see emar   Lab Tests to be Obtained with Dialysis: No  Blood Transfusion to be Given During Dialysis: No  Available IV Access: Yes  Medications to be Administered During Dialysis: No  Continuous IV Infusion Running: No  Restraints on Currently or in the Last 24 Hours: No  Hand-Off Communication: stable for hd no issues,4 liters nc   Dialysis Catheter Dressing: na avg  Last Dressing Change: na

## 2025-02-08 LAB
ANION GAP SERPL CALC-SCNC: 11 MMOL/L (ref 10–20)
BASOPHILS # BLD AUTO: 0.04 X10*3/UL (ref 0–0.1)
BASOPHILS NFR BLD AUTO: 0.4 %
BUN SERPL-MCNC: 13 MG/DL (ref 6–23)
CALCIUM SERPL-MCNC: 9 MG/DL (ref 8.6–10.3)
CHLORIDE SERPL-SCNC: 99 MMOL/L (ref 98–107)
CO2 SERPL-SCNC: 30 MMOL/L (ref 21–32)
CREAT SERPL-MCNC: 4.41 MG/DL (ref 0.5–1.05)
EGFRCR SERPLBLD CKD-EPI 2021: 9 ML/MIN/1.73M*2
EOSINOPHIL # BLD AUTO: 0.1 X10*3/UL (ref 0–0.4)
EOSINOPHIL NFR BLD AUTO: 1 %
ERYTHROCYTE [DISTWIDTH] IN BLOOD BY AUTOMATED COUNT: 18.6 % (ref 11.5–14.5)
GLUCOSE BLD MANUAL STRIP-MCNC: 114 MG/DL (ref 74–99)
GLUCOSE SERPL-MCNC: 73 MG/DL (ref 74–99)
HCT VFR BLD AUTO: 37.7 % (ref 36–46)
HGB BLD-MCNC: 11.3 G/DL (ref 12–16)
IMM GRANULOCYTES # BLD AUTO: 0.03 X10*3/UL (ref 0–0.5)
IMM GRANULOCYTES NFR BLD AUTO: 0.3 % (ref 0–0.9)
LYMPHOCYTES # BLD AUTO: 0.73 X10*3/UL (ref 0.8–3)
LYMPHOCYTES NFR BLD AUTO: 7.5 %
MCH RBC QN AUTO: 28.4 PG (ref 26–34)
MCHC RBC AUTO-ENTMCNC: 30 G/DL (ref 32–36)
MCV RBC AUTO: 95 FL (ref 80–100)
MONOCYTES # BLD AUTO: 0.82 X10*3/UL (ref 0.05–0.8)
MONOCYTES NFR BLD AUTO: 8.4 %
NEUTROPHILS # BLD AUTO: 7.99 X10*3/UL (ref 1.6–5.5)
NEUTROPHILS NFR BLD AUTO: 82.4 %
NRBC BLD-RTO: 0 /100 WBCS (ref 0–0)
PLATELET # BLD AUTO: 103 X10*3/UL (ref 150–450)
POTASSIUM SERPL-SCNC: 3.9 MMOL/L (ref 3.5–5.3)
RBC # BLD AUTO: 3.98 X10*6/UL (ref 4–5.2)
SODIUM SERPL-SCNC: 136 MMOL/L (ref 136–145)
WBC # BLD AUTO: 9.7 X10*3/UL (ref 4.4–11.3)

## 2025-02-08 PROCEDURE — 85025 COMPLETE CBC W/AUTO DIFF WBC: CPT | Performed by: INTERNAL MEDICINE

## 2025-02-08 PROCEDURE — 9420000001 HC RT PATIENT EDUCATION 5 MIN

## 2025-02-08 PROCEDURE — 2500000001 HC RX 250 WO HCPCS SELF ADMINISTERED DRUGS (ALT 637 FOR MEDICARE OP): Performed by: INTERNAL MEDICINE

## 2025-02-08 PROCEDURE — 2500000004 HC RX 250 GENERAL PHARMACY W/ HCPCS (ALT 636 FOR OP/ED): Performed by: INTERNAL MEDICINE

## 2025-02-08 PROCEDURE — 36415 COLL VENOUS BLD VENIPUNCTURE: CPT | Performed by: INTERNAL MEDICINE

## 2025-02-08 PROCEDURE — 2500000005 HC RX 250 GENERAL PHARMACY W/O HCPCS: Performed by: INTERNAL MEDICINE

## 2025-02-08 PROCEDURE — 99232 SBSQ HOSP IP/OBS MODERATE 35: CPT | Performed by: INTERNAL MEDICINE

## 2025-02-08 PROCEDURE — 1200000002 HC GENERAL ROOM WITH TELEMETRY DAILY

## 2025-02-08 PROCEDURE — 80048 BASIC METABOLIC PNL TOTAL CA: CPT | Performed by: INTERNAL MEDICINE

## 2025-02-08 PROCEDURE — 82947 ASSAY GLUCOSE BLOOD QUANT: CPT

## 2025-02-08 PROCEDURE — 94640 AIRWAY INHALATION TREATMENT: CPT

## 2025-02-08 PROCEDURE — 2500000001 HC RX 250 WO HCPCS SELF ADMINISTERED DRUGS (ALT 637 FOR MEDICARE OP): Performed by: PHARMACIST

## 2025-02-08 PROCEDURE — 2500000002 HC RX 250 W HCPCS SELF ADMINISTERED DRUGS (ALT 637 FOR MEDICARE OP, ALT 636 FOR OP/ED): Performed by: INTERNAL MEDICINE

## 2025-02-08 RX ADMIN — IPRATROPIUM BROMIDE AND ALBUTEROL SULFATE 3 ML: 2.5; .5 SOLUTION RESPIRATORY (INHALATION) at 07:05

## 2025-02-08 RX ADMIN — Medication 2 L/MIN: at 21:15

## 2025-02-08 RX ADMIN — GABAPENTIN 100 MG: 100 CAPSULE ORAL at 08:55

## 2025-02-08 RX ADMIN — Medication 2 L/MIN: at 12:03

## 2025-02-08 RX ADMIN — CARVEDILOL 6.25 MG: 6.25 TABLET, FILM COATED ORAL at 20:28

## 2025-02-08 RX ADMIN — SEVELAMER CARBONATE 800 MG: 800 TABLET, FILM COATED ORAL at 16:58

## 2025-02-08 RX ADMIN — HEPARIN SODIUM 5000 UNITS: 5000 INJECTION INTRAVENOUS; SUBCUTANEOUS at 05:11

## 2025-02-08 RX ADMIN — ATORVASTATIN CALCIUM 80 MG: 80 TABLET, FILM COATED ORAL at 08:55

## 2025-02-08 RX ADMIN — CARVEDILOL 6.25 MG: 6.25 TABLET, FILM COATED ORAL at 08:55

## 2025-02-08 RX ADMIN — Medication 2 L/MIN: at 07:05

## 2025-02-08 RX ADMIN — CEFTRIAXONE 2 G: 2 INJECTION, POWDER, FOR SOLUTION INTRAMUSCULAR; INTRAVENOUS at 20:28

## 2025-02-08 RX ADMIN — SEVELAMER CARBONATE 800 MG: 800 TABLET, FILM COATED ORAL at 08:55

## 2025-02-08 RX ADMIN — ASPIRIN 81 MG: 81 TABLET, COATED ORAL at 08:55

## 2025-02-08 RX ADMIN — AZITHROMYCIN DIHYDRATE 500 MG: 500 TABLET ORAL at 20:28

## 2025-02-08 RX ADMIN — HEPARIN SODIUM 5000 UNITS: 5000 INJECTION INTRAVENOUS; SUBCUTANEOUS at 13:02

## 2025-02-08 RX ADMIN — GABAPENTIN 100 MG: 100 CAPSULE ORAL at 20:28

## 2025-02-08 RX ADMIN — IPRATROPIUM BROMIDE AND ALBUTEROL SULFATE 3 ML: 2.5; .5 SOLUTION RESPIRATORY (INHALATION) at 21:13

## 2025-02-08 RX ADMIN — HEPARIN SODIUM 5000 UNITS: 5000 INJECTION INTRAVENOUS; SUBCUTANEOUS at 22:12

## 2025-02-08 RX ADMIN — MIRTAZAPINE 7.5 MG: 15 TABLET, FILM COATED ORAL at 20:28

## 2025-02-08 RX ADMIN — ISOSORBIDE MONONITRATE 60 MG: 30 TABLET, EXTENDED RELEASE ORAL at 08:55

## 2025-02-08 RX ADMIN — IPRATROPIUM BROMIDE AND ALBUTEROL SULFATE 3 ML: 2.5; .5 SOLUTION RESPIRATORY (INHALATION) at 12:03

## 2025-02-08 RX ADMIN — SEVELAMER CARBONATE 800 MG: 800 TABLET, FILM COATED ORAL at 13:01

## 2025-02-08 ASSESSMENT — COGNITIVE AND FUNCTIONAL STATUS - GENERAL
DRESSING REGULAR UPPER BODY CLOTHING: TOTAL
TOILETING: A LOT
MOVING TO AND FROM BED TO CHAIR: A LOT
HELP NEEDED FOR BATHING: A LOT
MOVING FROM LYING ON BACK TO SITTING ON SIDE OF FLAT BED WITH BEDRAILS: A LITTLE
DAILY ACTIVITIY SCORE: 9
CLIMB 3 TO 5 STEPS WITH RAILING: A LOT
WALKING IN HOSPITAL ROOM: A LOT
MOBILITY SCORE: 13
STANDING UP FROM CHAIR USING ARMS: A LOT
DRESSING REGULAR LOWER BODY CLOTHING: TOTAL
EATING MEALS: TOTAL
TURNING FROM BACK TO SIDE WHILE IN FLAT BAD: A LOT
PERSONAL GROOMING: A LOT

## 2025-02-08 ASSESSMENT — PAIN - FUNCTIONAL ASSESSMENT: PAIN_FUNCTIONAL_ASSESSMENT: 0-10

## 2025-02-08 ASSESSMENT — PAIN SCALES - GENERAL: PAINLEVEL_OUTOF10: 0 - NO PAIN

## 2025-02-08 NOTE — PROGRESS NOTES
02/08/25 0624   Discharge Planning   Expected Discharge Disposition Home         Per ID note patient developed new leukocytosis and is having loose stools. They will monitor labs and stool outputs. Still needs rec from ID for abx on discharge.

## 2025-02-08 NOTE — CARE PLAN
Problem: Pain - Adult  Goal: Verbalizes/displays adequate comfort level or baseline comfort level  Outcome: Progressing     Problem: Safety - Adult  Goal: Free from fall injury  Outcome: Progressing     Problem: Discharge Planning  Goal: Discharge to home or other facility with appropriate resources  Outcome: Progressing     Problem: Chronic Conditions and Co-morbidities  Goal: Patient's chronic conditions and co-morbidity symptoms are monitored and maintained or improved  Outcome: Progressing     Problem: Nutrition  Goal: Nutrient intake appropriate for maintaining nutritional needs  Outcome: Progressing     Problem: Skin  Goal: Promote skin healing  Outcome: Progressing

## 2025-02-08 NOTE — PROGRESS NOTES
Alyce Dougherty is a 84 y.o. female   More alert  Ate well  Had diarrhea yesterday but has not had any today    Review of Systems     Constitutional: no fever, no chills,   Cardiovascular: no chest pain   Respiratory: no cough, wheezing or shortness of breath a  Gastrointestinal: no abdominal pain, no constipation, no melena, no nausea, no diarrhea, no vomiting and no blood in stools.   Neurological: no headache,   All other systems have been reviewed and are negative for complaint.       Vitals:    02/08/25 1203   BP:    Pulse:    Resp:    Temp:    SpO2: 98%        Scheduled medications  aspirin, 81 mg, oral, Daily  atorvastatin, 80 mg, oral, Daily  azithromycin, 500 mg, oral, q24h  carvedilol, 6.25 mg, oral, BID  cefTRIAXone, 2 g, intravenous, q24h  dextrose, 25 g, intravenous, Once  gabapentin, 100 mg, oral, TID  heparin (porcine), 5,000 Units, subcutaneous, q8h CLEMENT  [Held by provider] ipratropium, 0.5 mg, nebulization, 4x daily  ipratropium-albuteroL, 3 mL, nebulization, TID  isosorbide mononitrate ER, 60 mg, oral, Daily  lidocaine-prilocaine, , Topical, Before Dialysis  [Held by provider] magnesium oxide, 400 mg, oral, Daily  mirtazapine, 7.5 mg, oral, Nightly  oxygen, , inhalation, Continuous - 02/gases  sevelamer carbonate, 800 mg, oral, TID      Continuous medications     PRN medications  PRN medications: benzonatate, dextrose, dextrose, glucagon, glucagon, ipratropium-albuteroL    Lab Review   Results from last 7 days   Lab Units 02/08/25  0540 02/07/25  0554 02/06/25  0612   WBC AUTO x10*3/uL 9.7 13.5* 4.5   HEMOGLOBIN g/dL 11.3* 11.1* 11.7*   HEMATOCRIT % 37.7 34.5* 37.3   PLATELETS AUTO x10*3/uL 103* 123* 118*     Results from last 7 days   Lab Units 02/08/25  0540 02/07/25  0554 02/06/25  0611 02/05/25  1310 02/04/25  1444   SODIUM mmol/L 136 135* 134*   < > 133*   POTASSIUM mmol/L 3.9 3.9 3.7   < > 4.1   CHLORIDE mmol/L 99 97* 96*   < > 95*   CO2 mmol/L 30 28 28   < > 25   BUN mg/dL 13 31* 25*   < >  42*   CREATININE mg/dL 4.41* 7.42* 6.43*   < > 8.47*   CALCIUM mg/dL 9.0 9.1 8.8   < > 9.7   PROTEIN TOTAL g/dL  --   --   --   --  7.2   BILIRUBIN TOTAL mg/dL  --   --   --   --  0.8   ALK PHOS U/L  --   --   --   --  221*   ALT U/L  --   --   --   --  3*   AST U/L  --   --   --   --  17   GLUCOSE mg/dL 73* 64* 79   < > 64*    < > = values in this interval not displayed.     Results from last 7 days   Lab Units 02/04/25  1622 02/04/25  1444   TROPHS ng/L 27* 30*        XR chest 1 view   Final Result   1.  Persistent bibasilar infiltrates and effusions for which   follow-up is recommended.             MACRO:   None        Signed by: Robe Alberto 2/7/2025 1:59 PM   Dictation workstation:   [id number]      XR chest 2 views   Final Result   Right basilar consolidation slightly worsened from prior with slight   interval increase of a right-sided parapneumonic effusion.        Signed by: Chandu Pulido 2/4/2025 6:30 PM   Dictation workstation:   [id number]            Physical Exam    Constitutional   General appearance: Alert and in no acute distress.   Pulmonary   Respiratory assessment: No respiratory distress, normal respiratory rhythm and effort.    Auscultation of Lungs: Faint rhonchi  Cardiovascular   Auscultation of heart: Apical pulse normal, heart rate and rhythm normal, normal S1 and S2, no murmurs and no pericardial rub.    Exam for edema: No peripheral edema.   Abdomen   Abdominal Exam: No bruits, normal bowel sounds, soft, non-tender, no abdominal mass palpated.    Liver and Spleen exam: No hepato-splenomegaly.   Musculoskeletal     Inspection/palpation of joints, bones and muscles: No joint swelling. Normal movement of all extremities.   Neurologic   Cranial nerves: Nerves 2-12 were intact, no focal neuro defects.         Assessment/Plan      #Community-acquired pneumonia  Continue IV Rocephin and Zithromax  Improving  #Myoclonus/tremors  From deconditioning    #Diarrhea  Now resolved    #Deconditioning  PT  OT input pending     #End-stage renal disease  Hemodialysis tomorrow morning     #Hypertension  Continue home medications     #Dyslipidemia  Continue home medications

## 2025-02-08 NOTE — CARE PLAN
The patient's goals for the shift include wants to feel better and go home, have dialysis    The clinical goals for the shift include pt safety      Problem: Pain - Adult  Goal: Verbalizes/displays adequate comfort level or baseline comfort level  Outcome: Progressing     Problem: Safety - Adult  Goal: Free from fall injury  Outcome: Progressing     Problem: Discharge Planning  Goal: Discharge to home or other facility with appropriate resources  Outcome: Progressing     Problem: Chronic Conditions and Co-morbidities  Goal: Patient's chronic conditions and co-morbidity symptoms are monitored and maintained or improved  Outcome: Progressing     Problem: Nutrition  Goal: Nutrient intake appropriate for maintaining nutritional needs  Outcome: Progressing     Problem: Skin  Goal: Promote skin healing  Outcome: Progressing

## 2025-02-09 VITALS
TEMPERATURE: 98.4 F | BODY MASS INDEX: 19.44 KG/M2 | RESPIRATION RATE: 18 BRPM | HEIGHT: 66 IN | DIASTOLIC BLOOD PRESSURE: 61 MMHG | OXYGEN SATURATION: 92 % | WEIGHT: 121 LBS | HEART RATE: 73 BPM | SYSTOLIC BLOOD PRESSURE: 129 MMHG

## 2025-02-09 LAB
ANION GAP SERPL CALC-SCNC: 9 MMOL/L (ref 10–20)
BACTERIA BLD CULT: NORMAL
BACTERIA BLD CULT: NORMAL
BASOPHILS # BLD AUTO: 0.04 X10*3/UL (ref 0–0.1)
BASOPHILS NFR BLD AUTO: 0.4 %
BUN SERPL-MCNC: 19 MG/DL (ref 6–23)
CALCIUM SERPL-MCNC: 9.3 MG/DL (ref 8.6–10.3)
CHLORIDE SERPL-SCNC: 100 MMOL/L (ref 98–107)
CO2 SERPL-SCNC: 30 MMOL/L (ref 21–32)
CREAT SERPL-MCNC: 5.7 MG/DL (ref 0.5–1.05)
EGFRCR SERPLBLD CKD-EPI 2021: 7 ML/MIN/1.73M*2
EOSINOPHIL # BLD AUTO: 0.19 X10*3/UL (ref 0–0.4)
EOSINOPHIL NFR BLD AUTO: 2 %
ERYTHROCYTE [DISTWIDTH] IN BLOOD BY AUTOMATED COUNT: 18.6 % (ref 11.5–14.5)
GLUCOSE SERPL-MCNC: 76 MG/DL (ref 74–99)
HCT VFR BLD AUTO: 36.2 % (ref 36–46)
HGB BLD-MCNC: 10.8 G/DL (ref 12–16)
IMM GRANULOCYTES # BLD AUTO: 0.04 X10*3/UL (ref 0–0.5)
IMM GRANULOCYTES NFR BLD AUTO: 0.4 % (ref 0–0.9)
LYMPHOCYTES # BLD AUTO: 0.88 X10*3/UL (ref 0.8–3)
LYMPHOCYTES NFR BLD AUTO: 9.5 %
MCH RBC QN AUTO: 27.8 PG (ref 26–34)
MCHC RBC AUTO-ENTMCNC: 29.8 G/DL (ref 32–36)
MCV RBC AUTO: 93 FL (ref 80–100)
MONOCYTES # BLD AUTO: 0.72 X10*3/UL (ref 0.05–0.8)
MONOCYTES NFR BLD AUTO: 7.7 %
NEUTROPHILS # BLD AUTO: 7.44 X10*3/UL (ref 1.6–5.5)
NEUTROPHILS NFR BLD AUTO: 80 %
NRBC BLD-RTO: 0 /100 WBCS (ref 0–0)
PLATELET # BLD AUTO: 109 X10*3/UL (ref 150–450)
POTASSIUM SERPL-SCNC: 3.7 MMOL/L (ref 3.5–5.3)
RBC # BLD AUTO: 3.88 X10*6/UL (ref 4–5.2)
SODIUM SERPL-SCNC: 135 MMOL/L (ref 136–145)
WBC # BLD AUTO: 9.3 X10*3/UL (ref 4.4–11.3)

## 2025-02-09 PROCEDURE — 2500000002 HC RX 250 W HCPCS SELF ADMINISTERED DRUGS (ALT 637 FOR MEDICARE OP, ALT 636 FOR OP/ED): Performed by: INTERNAL MEDICINE

## 2025-02-09 PROCEDURE — 2500000005 HC RX 250 GENERAL PHARMACY W/O HCPCS: Performed by: INTERNAL MEDICINE

## 2025-02-09 PROCEDURE — 99232 SBSQ HOSP IP/OBS MODERATE 35: CPT | Performed by: INTERNAL MEDICINE

## 2025-02-09 PROCEDURE — 9420000001 HC RT PATIENT EDUCATION 5 MIN

## 2025-02-09 PROCEDURE — 2500000001 HC RX 250 WO HCPCS SELF ADMINISTERED DRUGS (ALT 637 FOR MEDICARE OP): Performed by: PHARMACIST

## 2025-02-09 PROCEDURE — 85025 COMPLETE CBC W/AUTO DIFF WBC: CPT | Performed by: INTERNAL MEDICINE

## 2025-02-09 PROCEDURE — 1200000002 HC GENERAL ROOM WITH TELEMETRY DAILY

## 2025-02-09 PROCEDURE — 2500000001 HC RX 250 WO HCPCS SELF ADMINISTERED DRUGS (ALT 637 FOR MEDICARE OP): Performed by: INTERNAL MEDICINE

## 2025-02-09 PROCEDURE — 36415 COLL VENOUS BLD VENIPUNCTURE: CPT | Performed by: INTERNAL MEDICINE

## 2025-02-09 PROCEDURE — 2500000004 HC RX 250 GENERAL PHARMACY W/ HCPCS (ALT 636 FOR OP/ED): Performed by: INTERNAL MEDICINE

## 2025-02-09 PROCEDURE — 80048 BASIC METABOLIC PNL TOTAL CA: CPT | Performed by: INTERNAL MEDICINE

## 2025-02-09 PROCEDURE — 94640 AIRWAY INHALATION TREATMENT: CPT

## 2025-02-09 RX ADMIN — ATORVASTATIN CALCIUM 80 MG: 80 TABLET, FILM COATED ORAL at 09:42

## 2025-02-09 RX ADMIN — SEVELAMER CARBONATE 800 MG: 800 TABLET, FILM COATED ORAL at 17:18

## 2025-02-09 RX ADMIN — ASPIRIN 81 MG: 81 TABLET, COATED ORAL at 09:40

## 2025-02-09 RX ADMIN — HEPARIN SODIUM 5000 UNITS: 5000 INJECTION INTRAVENOUS; SUBCUTANEOUS at 14:07

## 2025-02-09 RX ADMIN — Medication 1 L/MIN: at 20:26

## 2025-02-09 RX ADMIN — ISOSORBIDE MONONITRATE 60 MG: 30 TABLET, EXTENDED RELEASE ORAL at 09:43

## 2025-02-09 RX ADMIN — AZITHROMYCIN DIHYDRATE 500 MG: 500 TABLET ORAL at 21:01

## 2025-02-09 RX ADMIN — GABAPENTIN 100 MG: 100 CAPSULE ORAL at 21:01

## 2025-02-09 RX ADMIN — GABAPENTIN 100 MG: 100 CAPSULE ORAL at 14:08

## 2025-02-09 RX ADMIN — HEPARIN SODIUM 5000 UNITS: 5000 INJECTION INTRAVENOUS; SUBCUTANEOUS at 21:01

## 2025-02-09 RX ADMIN — CARVEDILOL 6.25 MG: 6.25 TABLET, FILM COATED ORAL at 09:42

## 2025-02-09 RX ADMIN — CARVEDILOL 6.25 MG: 6.25 TABLET, FILM COATED ORAL at 21:01

## 2025-02-09 RX ADMIN — IPRATROPIUM BROMIDE AND ALBUTEROL SULFATE 3 ML: 2.5; .5 SOLUTION RESPIRATORY (INHALATION) at 20:13

## 2025-02-09 RX ADMIN — MIRTAZAPINE 7.5 MG: 15 TABLET, FILM COATED ORAL at 21:01

## 2025-02-09 RX ADMIN — IPRATROPIUM BROMIDE AND ALBUTEROL SULFATE 3 ML: 2.5; .5 SOLUTION RESPIRATORY (INHALATION) at 12:00

## 2025-02-09 RX ADMIN — Medication 1 L/MIN: at 12:00

## 2025-02-09 RX ADMIN — IPRATROPIUM BROMIDE AND ALBUTEROL SULFATE 3 ML: 2.5; .5 SOLUTION RESPIRATORY (INHALATION) at 07:09

## 2025-02-09 RX ADMIN — SEVELAMER CARBONATE 800 MG: 800 TABLET, FILM COATED ORAL at 09:30

## 2025-02-09 RX ADMIN — GABAPENTIN 100 MG: 100 CAPSULE ORAL at 09:43

## 2025-02-09 RX ADMIN — SEVELAMER CARBONATE 800 MG: 800 TABLET, FILM COATED ORAL at 14:07

## 2025-02-09 RX ADMIN — HEPARIN SODIUM 5000 UNITS: 5000 INJECTION INTRAVENOUS; SUBCUTANEOUS at 05:39

## 2025-02-09 RX ADMIN — CEFTRIAXONE 2 G: 2 INJECTION, POWDER, FOR SOLUTION INTRAMUSCULAR; INTRAVENOUS at 21:02

## 2025-02-09 RX ADMIN — Medication 1 L/MIN: at 07:10

## 2025-02-09 ASSESSMENT — PAIN SCALES - GENERAL
PAINLEVEL_OUTOF10: 0 - NO PAIN
PAINLEVEL_OUTOF10: 0 - NO PAIN

## 2025-02-09 ASSESSMENT — COGNITIVE AND FUNCTIONAL STATUS - GENERAL
EATING MEALS: TOTAL
STANDING UP FROM CHAIR USING ARMS: A LOT
DRESSING REGULAR LOWER BODY CLOTHING: TOTAL
WALKING IN HOSPITAL ROOM: A LOT
DAILY ACTIVITIY SCORE: 9
PERSONAL GROOMING: A LOT
MOVING TO AND FROM BED TO CHAIR: A LOT
MOVING FROM LYING ON BACK TO SITTING ON SIDE OF FLAT BED WITH BEDRAILS: A LITTLE
CLIMB 3 TO 5 STEPS WITH RAILING: A LOT
HELP NEEDED FOR BATHING: A LOT
TURNING FROM BACK TO SIDE WHILE IN FLAT BAD: A LOT
DRESSING REGULAR UPPER BODY CLOTHING: TOTAL
MOBILITY SCORE: 13
TOILETING: A LOT

## 2025-02-09 NOTE — CARE PLAN
Problem: Pain - Adult  Goal: Verbalizes/displays adequate comfort level or baseline comfort level  Outcome: Progressing     Problem: Safety - Adult  Goal: Free from fall injury  Outcome: Progressing     Problem: Discharge Planning  Goal: Discharge to home or other facility with appropriate resources  Outcome: Progressing     Problem: Chronic Conditions and Co-morbidities  Goal: Patient's chronic conditions and co-morbidity symptoms are monitored and maintained or improved  Outcome: Progressing     Problem: Nutrition  Goal: Nutrient intake appropriate for maintaining nutritional needs  Outcome: Progressing     Problem: Skin  Goal: Promote skin healing  2/9/2025 1343 by Vicky Medina RN  Outcome: Progressing  2/9/2025 1343 by Vicky Medina RN  Flowsheets (Taken 2/9/2025 1343)  Promote skin healing: Assess skin/pad under line(s)/device(s)

## 2025-02-09 NOTE — CARE PLAN
The patient's goals for the shift include wants to feel better and go home, have dialysis    The clinical goals for the shift include patient remains safe for this shift

## 2025-02-09 NOTE — PROGRESS NOTES
Alyce Dougherty is a 84 y.o. female     Continues to improve  Has not been seen by PT OT yet  Discussed with patient's son at bedside    Review of Systems     Constitutional: no fever, no chills,   Cardiovascular: no chest pain   Respiratory: no cough, wheezing or shortness of breath a  Gastrointestinal: no abdominal pain, no constipation, no melena, no nausea, no diarrhea, no vomiting and no blood in stools.   Neurological: no headache,   All other systems have been reviewed and are negative for complaint.       Vitals:    02/09/25 1200   BP:    Pulse:    Resp:    Temp:    SpO2: 93%        Scheduled medications  aspirin, 81 mg, oral, Daily  atorvastatin, 80 mg, oral, Daily  azithromycin, 500 mg, oral, q24h  carvedilol, 6.25 mg, oral, BID  cefTRIAXone, 2 g, intravenous, q24h  dextrose, 25 g, intravenous, Once  gabapentin, 100 mg, oral, TID  heparin (porcine), 5,000 Units, subcutaneous, q8h CLEMENT  [Held by provider] ipratropium, 0.5 mg, nebulization, 4x daily  ipratropium-albuteroL, 3 mL, nebulization, TID  isosorbide mononitrate ER, 60 mg, oral, Daily  lidocaine-prilocaine, , Topical, Before Dialysis  [Held by provider] magnesium oxide, 400 mg, oral, Daily  mirtazapine, 7.5 mg, oral, Nightly  oxygen, , inhalation, Continuous - 02/gases  sevelamer carbonate, 800 mg, oral, TID      Continuous medications     PRN medications  PRN medications: benzonatate, dextrose, dextrose, glucagon, glucagon, ipratropium-albuteroL    Lab Review   Results from last 7 days   Lab Units 02/09/25  0608 02/08/25  0540 02/07/25  0554   WBC AUTO x10*3/uL 9.3 9.7 13.5*   HEMOGLOBIN g/dL 10.8* 11.3* 11.1*   HEMATOCRIT % 36.2 37.7 34.5*   PLATELETS AUTO x10*3/uL 109* 103* 123*     Results from last 7 days   Lab Units 02/09/25  0608 02/08/25  0540 02/07/25  0554 02/05/25  1310 02/04/25  1444   SODIUM mmol/L 135* 136 135*   < > 133*   POTASSIUM mmol/L 3.7 3.9 3.9   < > 4.1   CHLORIDE mmol/L 100 99 97*   < > 95*   CO2 mmol/L 30 30 28   < > 25   BUN  mg/dL 19 13 31*   < > 42*   CREATININE mg/dL 5.70* 4.41* 7.42*   < > 8.47*   CALCIUM mg/dL 9.3 9.0 9.1   < > 9.7   PROTEIN TOTAL g/dL  --   --   --   --  7.2   BILIRUBIN TOTAL mg/dL  --   --   --   --  0.8   ALK PHOS U/L  --   --   --   --  221*   ALT U/L  --   --   --   --  3*   AST U/L  --   --   --   --  17   GLUCOSE mg/dL 76 73* 64*   < > 64*    < > = values in this interval not displayed.     Results from last 7 days   Lab Units 02/04/25  1622 02/04/25  1444   TROPHS ng/L 27* 30*        XR chest 1 view   Final Result   1.  Persistent bibasilar infiltrates and effusions for which   follow-up is recommended.             MACRO:   None        Signed by: Robe Alberto 2/7/2025 1:59 PM   Dictation workstation:   [id number]      XR chest 2 views   Final Result   Right basilar consolidation slightly worsened from prior with slight   interval increase of a right-sided parapneumonic effusion.        Signed by: Chandu Pulido 2/4/2025 6:30 PM   Dictation workstation:   [id number]            Physical Exam    Constitutional   General appearance: Alert and in no acute distress.   Pulmonary   Respiratory assessment: No respiratory distress, normal respiratory rhythm and effort.    Auscultation of Lungs: Faint rhonchi  Cardiovascular   Auscultation of heart: Apical pulse normal, heart rate and rhythm normal, normal S1 and S2, no murmurs and no pericardial rub.    Exam for edema: No peripheral edema.   Abdomen   Abdominal Exam: No bruits, normal bowel sounds, soft, non-tender, no abdominal mass palpated.    Liver and Spleen exam: No hepato-splenomegaly.   Musculoskeletal     Inspection/palpation of joints, bones and muscles: No joint swelling. Normal movement of all extremities.   Neurologic   Cranial nerves: Nerves 2-12 were intact, no focal neuro defects.         Assessment/Plan      #Community-acquired pneumonia  Continue IV Rocephin and Zithromax  Improving  #Myoclonus/tremors  From deconditioning  PT OT  pending    #Diarrhea  Now resolved    #Deconditioning  PT OT input pending     #End-stage renal disease  Hemodialysis tomorrow morning     #Hypertension  Continue home medications     #Dyslipidemia  Continue home medications

## 2025-02-10 ENCOUNTER — APPOINTMENT (OUTPATIENT)
Dept: DIALYSIS | Facility: HOSPITAL | Age: 85
End: 2025-02-10
Payer: COMMERCIAL

## 2025-02-10 LAB
ANION GAP SERPL CALC-SCNC: 14 MMOL/L (ref 10–20)
BASOPHILS # BLD AUTO: 0.05 X10*3/UL (ref 0–0.1)
BASOPHILS NFR BLD AUTO: 0.7 %
BUN SERPL-MCNC: 24 MG/DL (ref 6–23)
CALCIUM SERPL-MCNC: 9 MG/DL (ref 8.6–10.3)
CHLORIDE SERPL-SCNC: 100 MMOL/L (ref 98–107)
CO2 SERPL-SCNC: 27 MMOL/L (ref 21–32)
CORTIS AM PEAK SERPL-MSCNC: 21.3 UG/DL (ref 5–20)
CREAT SERPL-MCNC: 6.99 MG/DL (ref 0.5–1.05)
EGFRCR SERPLBLD CKD-EPI 2021: 5 ML/MIN/1.73M*2
EOSINOPHIL # BLD AUTO: 0.19 X10*3/UL (ref 0–0.4)
EOSINOPHIL NFR BLD AUTO: 2.8 %
ERYTHROCYTE [DISTWIDTH] IN BLOOD BY AUTOMATED COUNT: 18.1 % (ref 11.5–14.5)
GLUCOSE BLD MANUAL STRIP-MCNC: 100 MG/DL (ref 74–99)
GLUCOSE BLD MANUAL STRIP-MCNC: 118 MG/DL (ref 74–99)
GLUCOSE BLD MANUAL STRIP-MCNC: 50 MG/DL (ref 74–99)
GLUCOSE BLD MANUAL STRIP-MCNC: 66 MG/DL (ref 74–99)
GLUCOSE BLD MANUAL STRIP-MCNC: 89 MG/DL (ref 74–99)
GLUCOSE BLD MANUAL STRIP-MCNC: 98 MG/DL (ref 74–99)
GLUCOSE SERPL-MCNC: 52 MG/DL (ref 74–99)
HCT VFR BLD AUTO: 34.1 % (ref 36–46)
HGB BLD-MCNC: 11 G/DL (ref 12–16)
IMM GRANULOCYTES # BLD AUTO: 0.03 X10*3/UL (ref 0–0.5)
IMM GRANULOCYTES NFR BLD AUTO: 0.4 % (ref 0–0.9)
LYMPHOCYTES # BLD AUTO: 0.65 X10*3/UL (ref 0.8–3)
LYMPHOCYTES NFR BLD AUTO: 9.6 %
MCH RBC QN AUTO: 27.8 PG (ref 26–34)
MCHC RBC AUTO-ENTMCNC: 32.3 G/DL (ref 32–36)
MCV RBC AUTO: 86 FL (ref 80–100)
MONOCYTES # BLD AUTO: 0.69 X10*3/UL (ref 0.05–0.8)
MONOCYTES NFR BLD AUTO: 10.2 %
NEUTROPHILS # BLD AUTO: 5.13 X10*3/UL (ref 1.6–5.5)
NEUTROPHILS NFR BLD AUTO: 76.3 %
NRBC BLD-RTO: 0 /100 WBCS (ref 0–0)
PLATELET # BLD AUTO: 119 X10*3/UL (ref 150–450)
POTASSIUM SERPL-SCNC: 3.9 MMOL/L (ref 3.5–5.3)
RBC # BLD AUTO: 3.95 X10*6/UL (ref 4–5.2)
SODIUM SERPL-SCNC: 137 MMOL/L (ref 136–145)
WBC # BLD AUTO: 6.7 X10*3/UL (ref 4.4–11.3)

## 2025-02-10 PROCEDURE — 80048 BASIC METABOLIC PNL TOTAL CA: CPT | Performed by: INTERNAL MEDICINE

## 2025-02-10 PROCEDURE — 94640 AIRWAY INHALATION TREATMENT: CPT

## 2025-02-10 PROCEDURE — 2500000005 HC RX 250 GENERAL PHARMACY W/O HCPCS: Performed by: INTERNAL MEDICINE

## 2025-02-10 PROCEDURE — 97165 OT EVAL LOW COMPLEX 30 MIN: CPT | Mod: GO

## 2025-02-10 PROCEDURE — 9420000001 HC RT PATIENT EDUCATION 5 MIN

## 2025-02-10 PROCEDURE — 94664 DEMO&/EVAL PT USE INHALER: CPT

## 2025-02-10 PROCEDURE — 82533 TOTAL CORTISOL: CPT | Mod: AHULAB | Performed by: INTERNAL MEDICINE

## 2025-02-10 PROCEDURE — 8010000001 HC DIALYSIS - HEMODIALYSIS PER DAY

## 2025-02-10 PROCEDURE — 85025 COMPLETE CBC W/AUTO DIFF WBC: CPT | Performed by: INTERNAL MEDICINE

## 2025-02-10 PROCEDURE — 2500000001 HC RX 250 WO HCPCS SELF ADMINISTERED DRUGS (ALT 637 FOR MEDICARE OP): Performed by: INTERNAL MEDICINE

## 2025-02-10 PROCEDURE — 2500000004 HC RX 250 GENERAL PHARMACY W/ HCPCS (ALT 636 FOR OP/ED): Performed by: INTERNAL MEDICINE

## 2025-02-10 PROCEDURE — 2500000001 HC RX 250 WO HCPCS SELF ADMINISTERED DRUGS (ALT 637 FOR MEDICARE OP): Performed by: PHARMACIST

## 2025-02-10 PROCEDURE — 82947 ASSAY GLUCOSE BLOOD QUANT: CPT

## 2025-02-10 PROCEDURE — 97161 PT EVAL LOW COMPLEX 20 MIN: CPT | Mod: GP

## 2025-02-10 PROCEDURE — 1200000002 HC GENERAL ROOM WITH TELEMETRY DAILY

## 2025-02-10 PROCEDURE — 36415 COLL VENOUS BLD VENIPUNCTURE: CPT | Performed by: INTERNAL MEDICINE

## 2025-02-10 PROCEDURE — 99231 SBSQ HOSP IP/OBS SF/LOW 25: CPT | Performed by: INTERNAL MEDICINE

## 2025-02-10 PROCEDURE — 2500000002 HC RX 250 W HCPCS SELF ADMINISTERED DRUGS (ALT 637 FOR MEDICARE OP, ALT 636 FOR OP/ED): Performed by: INTERNAL MEDICINE

## 2025-02-10 RX ADMIN — AZITHROMYCIN DIHYDRATE 500 MG: 500 TABLET ORAL at 20:26

## 2025-02-10 RX ADMIN — ASPIRIN 81 MG: 81 TABLET, COATED ORAL at 08:38

## 2025-02-10 RX ADMIN — MIRTAZAPINE 7.5 MG: 15 TABLET, FILM COATED ORAL at 20:27

## 2025-02-10 RX ADMIN — HEPARIN SODIUM 5000 UNITS: 5000 INJECTION INTRAVENOUS; SUBCUTANEOUS at 14:17

## 2025-02-10 RX ADMIN — HEPARIN SODIUM 5000 UNITS: 5000 INJECTION INTRAVENOUS; SUBCUTANEOUS at 20:27

## 2025-02-10 RX ADMIN — HEPARIN SODIUM 5000 UNITS: 5000 INJECTION INTRAVENOUS; SUBCUTANEOUS at 06:21

## 2025-02-10 RX ADMIN — CARVEDILOL 6.25 MG: 6.25 TABLET, FILM COATED ORAL at 14:17

## 2025-02-10 RX ADMIN — ATORVASTATIN CALCIUM 80 MG: 80 TABLET, FILM COATED ORAL at 08:38

## 2025-02-10 RX ADMIN — GABAPENTIN 100 MG: 100 CAPSULE ORAL at 20:27

## 2025-02-10 RX ADMIN — IPRATROPIUM BROMIDE AND ALBUTEROL SULFATE 3 ML: 2.5; .5 SOLUTION RESPIRATORY (INHALATION) at 07:07

## 2025-02-10 RX ADMIN — SEVELAMER CARBONATE 800 MG: 800 TABLET, FILM COATED ORAL at 08:38

## 2025-02-10 RX ADMIN — LIDOCAINE AND PRILOCAINE: 25; 25 CREAM TOPICAL at 08:40

## 2025-02-10 RX ADMIN — DEXTROSE MONOHYDRATE 12.5 G: 25 INJECTION, SOLUTION INTRAVENOUS at 07:34

## 2025-02-10 RX ADMIN — Medication 1 L/MIN: at 07:07

## 2025-02-10 RX ADMIN — DEXTROSE MONOHYDRATE 12.5 G: 25 INJECTION, SOLUTION INTRAVENOUS at 08:03

## 2025-02-10 RX ADMIN — CEFTRIAXONE 2 G: 2 INJECTION, POWDER, FOR SOLUTION INTRAMUSCULAR; INTRAVENOUS at 20:27

## 2025-02-10 RX ADMIN — CARVEDILOL 6.25 MG: 6.25 TABLET, FILM COATED ORAL at 20:27

## 2025-02-10 RX ADMIN — ISOSORBIDE MONONITRATE 60 MG: 30 TABLET, EXTENDED RELEASE ORAL at 14:17

## 2025-02-10 RX ADMIN — GABAPENTIN 100 MG: 100 CAPSULE ORAL at 08:38

## 2025-02-10 RX ADMIN — GABAPENTIN 100 MG: 100 CAPSULE ORAL at 14:17

## 2025-02-10 ASSESSMENT — PAIN SCALES - GENERAL
PAINLEVEL_OUTOF10: 0 - NO PAIN

## 2025-02-10 ASSESSMENT — COGNITIVE AND FUNCTIONAL STATUS - GENERAL
CLIMB 3 TO 5 STEPS WITH RAILING: TOTAL
DRESSING REGULAR UPPER BODY CLOTHING: TOTAL
DRESSING REGULAR LOWER BODY CLOTHING: TOTAL
EATING MEALS: TOTAL
WALKING IN HOSPITAL ROOM: TOTAL
STANDING UP FROM CHAIR USING ARMS: TOTAL
HELP NEEDED FOR BATHING: TOTAL
TOILETING: TOTAL
CLIMB 3 TO 5 STEPS WITH RAILING: TOTAL
DAILY ACTIVITIY SCORE: 10
MOVING FROM LYING ON BACK TO SITTING ON SIDE OF FLAT BED WITH BEDRAILS: A LITTLE
DRESSING REGULAR LOWER BODY CLOTHING: A LOT
STANDING UP FROM CHAIR USING ARMS: A LOT
PERSONAL GROOMING: A LOT
MOBILITY SCORE: 11
TURNING FROM BACK TO SIDE WHILE IN FLAT BAD: A LITTLE
MOVING TO AND FROM BED TO CHAIR: TOTAL
EATING MEALS: A LOT
MOVING TO AND FROM BED TO CHAIR: TOTAL
PERSONAL GROOMING: A LOT
MOBILITY SCORE: 11
TURNING FROM BACK TO SIDE WHILE IN FLAT BAD: A LITTLE
HELP NEEDED FOR BATHING: A LOT
DAILY ACTIVITIY SCORE: 8
TOILETING: TOTAL
WALKING IN HOSPITAL ROOM: TOTAL
DRESSING REGULAR UPPER BODY CLOTHING: A LOT

## 2025-02-10 ASSESSMENT — PAIN - FUNCTIONAL ASSESSMENT
PAIN_FUNCTIONAL_ASSESSMENT: 0-10

## 2025-02-10 ASSESSMENT — ACTIVITIES OF DAILY LIVING (ADL): LACK_OF_TRANSPORTATION: NO

## 2025-02-10 NOTE — PROGRESS NOTES
02/10/25 1038   Discharge Planning   Living Arrangements Children   Support Systems Children   Assistance Needed relies on others for assistance   Type of Residence Private residence   Home or Post Acute Services Post acute facilities (Rehab/SNF/etc)   Type of Post Acute Facility Services Skilled nursing   Expected Discharge Disposition SNF   Does the patient need discharge transport arranged? Yes   RoundTrip coordination needed? Yes   Financial Resource Strain   How hard is it for you to pay for the very basics like food, housing, medical care, and heating? Not hard   Housing Stability   In the last 12 months, was there a time when you were not able to pay the mortgage or rent on time? N   In the past 12 months, how many times have you moved where you were living? 0   At any time in the past 12 months, were you homeless or living in a shelter (including now)? N   Transportation Needs   In the past 12 months, has lack of transportation kept you from medical appointments or from getting medications? no   In the past 12 months, has lack of transportation kept you from meetings, work, or from getting things needed for daily living? No   Patient Choice   Patient / Family choosing to utilize agency / facility established prior to hospitalization Yes     Met with patient and son Calin at bedside  Original dc plan was home with family, Calin states he and his wife provide all care for patient and transport to appts to include HD 3x weekly. Son states patient is now much weaker and he is concerned if he can care for her at this time. He is interested in her going to [facility] SNF as she has been there before.   DSC asked to send new SNF referral to [facility] and let them know she is HD.    ADOD today/tomorrow  BARRIERS pt/ot, accepting SNF, final plan from ID  DISPO SNF? Vs home with [facility]

## 2025-02-10 NOTE — PRE-PROCEDURE NOTE
Report from Sending RN:    Report From: Lanie Oliveros  Recent Surgery of Procedure: No  Baseline Level of Consciousness (LOC): A&O X's 3  Oxygen Use: Yes  Type: 1L/NC  Diabetic: No  Last BP Med Given Day of Dialysis: see EMAR  Last Pain Med Given: see EMAR  Lab Tests to be Obtained with Dialysis: No  Blood Transfusion to be Given During Dialysis: No  Available IV Access: Yes  Medications to be Administered During Dialysis: No  Continuous IV Infusion Running: No  Restraints on Currently or in the Last 24 Hours: No  Hand-Off Communication: Delay in coming down dur to low BS  Dialysis Catheter Dressing: n/a  Last Dressing Change: n/a

## 2025-02-10 NOTE — PROGRESS NOTES
Physical Therapy                 Therapy Communication Note    Patient Name: Alyce Dougherty  MRN: [id number]  Department: [place] [address]  Room: [address] [place] [address]  Today's Date: 2/10/2025     Completion of this session, clinical decision making, and documentation performed under the supervision of Lizbeth Reyes PT, DPT     Discipline: Physical Therapy    PT Missed Visit: Yes     Missed Visit Reason: Missed Visit Reason: Patient in a medical procedure (Pt. going to dialysis)    Missed Time: Attempt    Comment:

## 2025-02-10 NOTE — PROGRESS NOTES
Occupational Therapy    Evaluation    Patient Name: Alyce Dougherty  MRN: [id number]  Department: [facility] A 6  Room: [facility]60-  Today's Date: 2/10/2025  Time Calculation  Start Time: 1448  Stop Time: 1508  Time Calculation (min): 20 min        Assessment:  OT Assessment: Pt presents with reduced cognition, safety awareness, balance, endurance, strength, ROM and coordination, impeding ADL perfrmance and functional mobility. Pt would benefit from skilled OT services to address these deficits and facilitate highest level of function.  Prognosis: Fair  Barriers to Discharge Home: Caregiver assistance, Cognition needs, Physical needs  Caregiver Assistance: Caregiver assistance needed per identified barriers - however, level of patient's required assistance exceeds assistance available at home  Cognition Needs: 24hr supervision for safety awareness needed, Insight of patient limited regarding functional ability/needs, Cognition-related high falls risk  Physical Needs: 24hr mobility assistance needed, 24hr ADL assistance needed, High falls risk due to function or environment  Evaluation/Treatment Tolerance: Patient limited by fatigue (Cognition)  Medical Staff Made Aware: Yes  End of Session Communication: Bedside nurse  End of Session Patient Position: Bed, 3 rail up, Alarm on (on bedpan, RN notified)  OT Assessment Results: Decreased ADL status, Decreased upper extremity range of motion, Decreased upper extremity strength, Decreased safe judgment during ADL, Decreased cognition, Decreased endurance, Decreased fine motor control, Decreased functional mobility, Decreased IADLs, Decreased trunk control for functional activities  Prognosis: Fair  Barriers to Discharge: Decreased caregiver support  Evaluation/Treatment Tolerance: Patient limited by fatigue (Cognition)  Medical Staff Made Aware: Yes  Strengths: Support and attitude of living partners, Premorbid level of function, Housing layout  Barriers to Participation:  Comorbidities, Insight into problems  Plan:  Treatment Interventions: ADL retraining, Functional transfer training, UE strengthening/ROM, Endurance training, Cognitive reorientation, Patient/family training, Equipment evaluation/education, Neuromuscular reeducation, Fine motor coordination activities, Compensatory technique education  OT Frequency: 3 times per week  OT Discharge Recommendations: Moderate intensity level of continued care  OT Recommended Transfer Status: Assist of 2, Moderate assist  OT - OK to Discharge: Yes (Per POC)  Treatment Interventions: ADL retraining, Functional transfer training, UE strengthening/ROM, Endurance training, Cognitive reorientation, Patient/family training, Equipment evaluation/education, Neuromuscular reeducation, Fine motor coordination activities, Compensatory technique education    Subjective     General:  General  Reason for Referral: SOB, cough, PNA, missed dialysis  Referred By: Chanelle Corado MD  Past Medical History Relevant to Rehab:   Past Medical History:   Diagnosis Date    Chronic combined systolic and diastolic congestive heart failure 01/31/2024    -ECHO 8/1/2022:  The left ventricular systolic function is moderately to severely decreased, with an estimated ejection fraction of 30-35%. There is global hypokinesis of the left ventricle with minor regional variations. The left ventricular cavity size is normal. Spectral Doppler shows a pseudonormal pattern of left ventricular diastolic filling.     Chronic systolic (congestive) heart failure 06/26/2022    End stage renal disease (Multi) 06/26/2022    Essential hypertension 03/14/2023    Hyperlipidemia 03/14/2023    Metabolic encephalopathy 07/20/2022    PAD (peripheral artery disease) (CMS-[facility]) 03/14/2023     OT Missed Visit: Yes  Missed Visit Reason: Patient in a medical procedure (OT orders recieved and chart reviewed. Pt off the floor for dialysis at this time. Will reattempt as able.)  Family/Caregiver  Present: Yes (son present for beginning of session, pleasant and supportive)  Co-Treatment: PT  Co-Treatment Reason: To increase patient safety, transfer ability, and participation.  Prior to Session Communication: Bedside nurse  Patient Position Received: Bed, 3 rail up, Alarm on  Preferred Learning Style: auditory, verbal, visual  General Comment: Pt pleasantly confused however agreeable to eval. Pt's son present for session and assists with providing history.  Precautions:  Hearing/Visual Limitations: Hard of hearing  Medical Precautions: Fall precautions     Date/Time Vitals Session Patient Position Pulse Resp SpO2 BP MAP (mmHg)    02/10/25 1528 --  --  82  22  93 %  117/55  76                 Pain:  Pain Assessment  Pain Assessment: 0-10  0-10 (Numeric) Pain Score: 0 - No pain    Objective   Cognition:  Overall Cognitive Status: Impaired  Orientation Level: Disoriented to situation, Disoriented to time  Insight: Moderate           Home Living:  Type of Home: House  Lives With:  (Son, daughter in  law)  Home Adaptive Equipment: Walker rolling or standard, Cane (rollator)  Home Layout: One level  Home Access: Stairs to enter without rails  Entrance Stairs-Rails: None  Entrance Stairs-Number of Steps: 1  Bathroom Shower/Tub: Tub/shower unit  Bathroom Toilet: Adaptive toilet seating  Bathroom Equipment: Raised toilet seat with rails, Shower chair with back  Prior Function:  Level of [place]: Needs assistance with ADLs, Needs assistance with homemaking  Receives Help From: Family  ADL Assistance:  (Able to dress self, assist with bathing/showering)  Homemaking Assistance: Needs assistance (son & daughter in law completes iADLs)  Ambulatory Assistance: Independent (using rollator, cane used to walker from house to the car. Pt requires assist from son when using cane.)  Prior Function Comments: Denies any falls in the past 6 months.     ADL:  UE Dressing Assistance: Maximal  UE Dressing Deficit: Sukhi RUEMANUEL  Thread LUE, Pull around back  LE Dressing Assistance: Maximal  LE Dressing Deficit: Don/doff R sock, Don/doff L sock, Thread RLE into underwear, Thread LLE into underwear, Pull up over hips  Activity Tolerance:  Endurance: Tolerates 10 - 20 min exercise with multiple rests  Bed Mobility/Transfers: Bed Mobility  Bed Mobility: Yes  Bed Mobility 1  Bed Mobility 1: Supine to sitting  Level of Assistance 1: Close supervision  Bed Mobility Comments 1: Cues for hand/foot placement and sequencing.  Bed Mobility 2  Bed Mobility  2: Sitting to supine  Level of Assistance 2: Maximum assistance, +2  Bed Mobility Comments 2: Assist at trunk and LEs.    Transfers  Transfer: Yes  Transfer 1  Transfer From 1: Bed to  Transfer to 1: Stand  Technique 1: Sit to stand, Stand to sit  Transfer Device 1: Walker, Gait belt  Transfer Level of Assistance 1: Moderate assistance, +2  Trials/Comments 1: Cues for hand placement and scooting to the EOB. Pt completes 2 trials however immediately begins having diarrhea with each attempt to stand. Pt returned to bed to use bed pan.       Sitting Balance:  Static Sitting Balance  Static Sitting-Balance Support: Feet supported  Static Sitting-Level of Assistance: Contact guard  Static Sitting-Comment/Number of Minutes: EOB. Intermittent jerky trunk movements requiring CGA when sitting EOB  Dynamic Sitting Balance  Dynamic Sitting-Balance Support: Feet supported  Dynamic Sitting-Level of Assistance: Contact guard  Dynamic Sitting-Balance: Reaching across midline, Forward lean  Dynamic Sitting-Comments: EOB while completing ADLs  Standing Balance:  Static Standing Balance  Static Standing-Balance Support: Bilateral upper extremity supported  Static Standing-Level of Assistance:  (Min-Mod)      Vision:Vision - Basic Assessment  Current Vision: No visual deficits  Sensation:  Light Touch: No apparent deficits  Strength:  Strength Comments: Limited bilateral shoulder  flexion  Perception:  Inattention/Neglect: Cues to maintain midline in sitting  Coordination:  Movements are Fluid and Coordinated: No  Finger to Nose: Impaired, Bradykinesia (difficulty completing due to jerky UE mid movement)  Coordination Comment: Pt demo jerky UE/LE and trunk movements   Hand Function:  Gross Grasp: Functional  Coordination: Functional  Extremities: RUE   RUE : Within Functional Limits (Grossly greater than or equal to 3-/5 distally) and LUE   LUE: Within Functional Limits (Grossly greater than or equal to 3-/5 distally)    Outcome Measures:[facility] Daily Activity  Putting on and taking off regular lower body clothing: Total  Bathing (including washing, rinsing, drying): A lot  Putting on and taking off regular upper body clothing: A lot  Toileting, which includes using toilet, bedpan or urinal: Total  Taking care of personal grooming such as brushing teeth: A lot  Eating Meals: A lot  Daily Activity - Total Score: 10        Education Documentation  Body Mechanics, taught by Phyllis Shah OT at 2/10/2025  4:42 PM.  Learner: Patient  Readiness: Acceptance  Method: Explanation  Response: Needs Reinforcement    Precautions, taught by Phyllis Shah OT at 2/10/2025  4:42 PM.  Learner: Patient  Readiness: Acceptance  Method: Explanation  Response: Needs Reinforcement    ADL Training, taught by Phyllis Shah OT at 2/10/2025  4:42 PM.  Learner: Patient  Readiness: Acceptance  Method: Explanation  Response: Needs Reinforcement    Education Comments  No comments found.        OP EDUCATION:       Goals:  Encounter Problems       Encounter Problems (Active)       ADLs       Patient will perform sponge UB and LB bathing with minimal assist  level of assistance.       Start:  02/10/25    Expected End:  02/24/25            Patient with complete upper body dressing with stand by assist level of assistance donning and doffing all UE clothes with PRN adaptive equipment while supported sitting and edge of  bed.       Start:  02/10/25    Expected End:  02/24/25            Patient with complete lower body dressing with minimal assist  level of assistance donning and doffing all LE clothes  with PRN adaptive equipment while supported sitting and edge of bed        Start:  02/10/25    Expected End:  02/24/25            Patient will feed self with stand by assist level of assistance and verbal cues, tactile cues, and visual cues using PRN adaptive equipment.       Start:  02/10/25    Expected End:  02/24/25            Patient will complete daily grooming tasks with stand by assist level of assistance and PRN adaptive equipment while supported sitting and edge of bed .       Start:  02/10/25    Expected End:  02/24/25            Patient will complete toileting including hygiene clothing management/hygiene with moderate assist level of assistance and bedside commode.       Start:  02/10/25    Expected End:  02/24/25               MOBILITY       Patient will perform Functional mobility x Household distances/Community Distances with minimal assist  level of assistance and front wheeled walker in order to improve safety and functional mobility.       Start:  02/10/25    Expected End:  02/24/25               TRANSFERS       Patient will perform bed mobility contact guard assist level of assistance and bed rails in order to improve safety and independence with mobility       Start:  02/10/25    Expected End:  02/24/25            Patient will complete sit to stand transfer with minimal assist  level of assistance and front wheeled walker in order to improve safety and prepare for out of bed mobility.       Start:  02/10/25    Expected End:  02/24/25

## 2025-02-10 NOTE — PROGRESS NOTES
Occupational Therapy                 Therapy Communication Note    Patient Name: Alyce Dougherty  MRN: [id number]  Department: [facility] DIALYSIS  Room: 603/603-A  Today's Date: 2/10/2025     Discipline: Occupational Therapy    OT Missed Visit: Yes     Missed Visit Reason: Missed Visit Reason: Patient in a medical procedure (OT orders recieved and chart reviewed. Pt off the floor for dialysis at this time. Will reattempt as able.)    Missed Time: Attempt

## 2025-02-10 NOTE — PROGRESS NOTES
Physical Therapy    Physical Therapy Evaluation    Patient Name: Alyce Dougherty  MRN: [id number]  Department: [place] [address]  Room: [address] [place] [address]  Today's Date: 2/10/2025   Time Calculation  Start Time: 1438  Stop Time: 1508  Time Calculation (min): 30 min    Assessment/Plan   PT Assessment  PT Assessment Results: Decreased strength, Decreased range of motion, Decreased endurance, Impaired balance, Decreased mobility, Decreased cognition, Impaired judgement, Decreased safety awareness  Rehab Prognosis: Good  Barriers to Discharge Home: Cognition needs, Physical needs, Caregiver assistance  Caregiver Assistance: Caregiver assistance needed per identified barriers - however, level of patient's required assistance exceeds assistance available at home  Cognition Needs: 24hr supervision for safety awareness needed, Insight of patient limited regarding functional ability/needs, Cognition-related high falls risk  Physical Needs: Stair navigation into home limited by function/safety, Ambulating household distances limited by function/safety, 24hr mobility assistance needed, 24hr ADL assistance needed  End of Session Communication: Bedside nurse  Assessment Comment: PT Evaluation Completed. The patient presented with decreased mobility, balance, endurance, strength, safety awareness, and increased lethargy, cognitive deficits, and fatigue. These impairments are negatively impacting her ability to perform at baseline functional level, in home environment. The patient is at risk of falls & injury. This patient would benefit from skilled therapy intervention to address limitations and progress towards the PT goals.  Anticipate moderate frequency PT needs at discharge  End of Session Patient Position: Bed, 3 rail up, Alarm on (on bedpan, RN notified)  IP OR SWING BED PT PLAN  Inpatient or Swing Bed: Inpatient  PT Plan  Treatment/Interventions: Bed mobility, Transfer training, Gait training, Balance training, Stair training, Strengthening,  Endurance training, Therapeutic exercise, Range of motion, Therapeutic activity, Home exercise program  PT Plan: Ongoing PT  PT Frequency: 3 times per week  PT Discharge Recommendations: Moderate intensity level of continued care  PT Recommended Transfer Status: Assist x2  PT - OK to Discharge: Yes (PT POC established.)    Subjective   General Visit Information:  General  Reason for Referral: SOB, cough, PNA, missed dialysis  Referred By: Chanelle Corado MD  Past Medical History Relevant to Rehab:   Past Medical History:   Diagnosis Date    Chronic combined systolic and diastolic congestive heart failure 01/31/2024    -ECHO 8/1/2022:  The left ventricular systolic function is moderately to severely decreased, with an estimated ejection fraction of 30-35%. There is global hypokinesis of the left ventricle with minor regional variations. The left ventricular cavity size is normal. Spectral Doppler shows a pseudonormal pattern of left ventricular diastolic filling.     Chronic systolic (congestive) heart failure 06/26/2022    End stage renal disease (Multi) 06/26/2022    Essential hypertension 03/14/2023    Hyperlipidemia 03/14/2023    Metabolic encephalopathy 07/20/2022    PAD (peripheral artery disease) (CMS-[facility]) 03/14/2023       Co-Treatment: OT  Co-Treatment Reason: To increase patient safety, transfer ability, and participation.  Prior to Session Communication: Bedside nurse  Patient Position Received: Bed, 3 rail up, Alarm on  General Comment: Pt pleasantly confused however agreeable to eval. Pt's son present for session and assists with providing history.  Home Living:  Home Living  Type of Home: House  Lives With:  (son, daughter in law)  Home Adaptive Equipment: Walker rolling or standard, Cane (rollator)  Home Layout: One level  Home Access: Stairs to enter without rails  Entrance Stairs-Number of Steps: 1  Bathroom Shower/Tub: Tub/shower unit  Bathroom Toilet: Adaptive toilet seating  Bathroom Equipment:  Raised toilet seat with rails, Shower chair with back  Prior Level of Function:  Prior Function Per Pt/Caregiver Report  Level of [place]: Needs assistance with ADLs, Needs assistance with homemaking  Receives Help From: Family  ADL Assistance:  (IND dressing, assist to complete bathing)  Homemaking Assistance: Needs assistance  Ambulatory Assistance: Independent (using rollator, cane used to walker from house to the car. Pt requires assist from son when using cane.)  Prior Function Comments: Denies any falls in the past 6 months.  Precautions:  Precautions  Hearing/Visual Limitations: Hard of hearing  Medical Precautions: Fall precautions      Date/Time Vitals Session Patient Position Pulse Resp SpO2 BP MAP (mmHg)    02/10/25 1528 --  --  82  22  93 %  117/55  76                 Objective   Pain:  Pain Assessment  Pain Assessment: 0-10  0-10 (Numeric) Pain Score: 0 - No pain  Cognition:  Cognition  Overall Cognitive Status: Impaired  Orientation Level: Disoriented to situation, Disoriented to time  Insight: Moderate    General Assessments:  Activity Tolerance  Endurance: Tolerates 10 - 20 min exercise with multiple rests    Sensation  Light Touch: No apparent deficits            Postural Control  Postural Control: Within Functional Limits    Static Sitting Balance  Static Sitting-Balance Support: Feet supported, Bilateral upper extremity supported  Static Sitting-Level of Assistance: Contact guard  Dynamic Sitting Balance  Dynamic Sitting-Balance Support: Feet supported, Bilateral upper extremity supported  Dynamic Sitting-Level of Assistance: Contact guard  Dynamic Sitting-Comments: Retro leaning    Static Standing Balance  Static Standing-Balance Support: Bilateral upper extremity supported  Static Standing-Level of Assistance: Moderate assistance  Functional Assessments:  Bed Mobility  Bed Mobility: Yes  Bed Mobility 1  Bed Mobility 1: Supine to sitting  Level of Assistance 1: Close supervision  Bed  Mobility Comments 1: Cues for hand/foot placement and sequencing.  Bed Mobility 2  Bed Mobility  2: Sitting to supine  Level of Assistance 2: Maximum assistance, +2  Bed Mobility Comments 2: Assist at trunk and LEs.    Transfers  Transfer: Yes  Transfer 1  Technique 1: Sit to stand, Stand to sit  Transfer Device 1: Walker  Transfer Level of Assistance 1: Moderate assistance, +2  Trials/Comments 1: Cues for hand placement and scooting to the EOB. Pt completes 2 trials however immediately begins having diarrhea with each attempt to stand. Pt returned to bed to use bed pan.    Ambulation/Gait Training  Ambulation/Gait Training Performed: No (Unable to complete at this time.)  Extremity/Trunk Assessments:  RUE   RUE : Within Functional Limits  LUE   LUE: Within Functional Limits  RLE   RLE :  (Strength >3/5 based on observation of functional mobility. ROM WFL.)  LLE   LLE :  (Strength >3/5 based on observation of functional mobility. ROM WFL.)  Outcome Measures:  [facility] Basic Mobility  Turning from your back to your side while in a flat bed without using bedrails: A little  Moving from lying on your back to sitting on the side of a flat bed without using bedrails: A little  Moving to and from bed to chair (including a wheelchair): Total  Standing up from a chair using your arms (e.g. wheelchair or bedside chair): A lot  To walk in hospital room: Total  Climbing 3-5 steps with railing: Total  Basic Mobility - Total Score: 11    Encounter Problems       Encounter Problems (Active)       Balance       Pt will tolerate 10 + mins dynamic standing balance activities with CGA or less and no LOB using a RW       Start:  02/10/25    Expected End:  02/24/25               Mobility       STG - Patient will ambulate 25 ft with CGA and a RW.        Start:  02/10/25    Expected End:  02/24/25               PT Transfers       STG - Patient will perform bed mobility independently.        Start:  02/10/25    Expected End:  02/24/25             STG - Patient will transfer sit to and from stand with CGA and a RW.        Start:  02/10/25    Expected End:  02/24/25               Pain - Adult              Education Documentation  Body Mechanics, taught by Lizbeth Reyes, PT at 2/10/2025  4:28 PM.  Learner: Patient  Readiness: Acceptance  Method: Explanation  Response: Verbalizes Understanding    Mobility Training, taught by Libzeth Reyes, PT at 2/10/2025  4:28 PM.  Learner: Patient  Readiness: Acceptance  Method: Explanation  Response: Verbalizes Understanding    Education Comments  No comments found.

## 2025-02-10 NOTE — POST-PROCEDURE NOTE
Report to Receiving RN:    Report To: Lanie Oliveros  Time Report Called: 1320  Hand-Off Communication: Ms Dougherty is returning to you after a 3.5 hour treatment. She removed 2 liters of fluid and her last /90. Slept most of treatment. BS at 12:30 = 98   Complications During Treatment: No  Ultrafiltration Treatment: No  Medications Administered During Dialysis: No  Blood Products Administered During Dialysis: No  Labs Sent During Dialysis: No  Heparin Drip Rate Changes: No  Dialysis Catheter Dressing: n/a  Last Dressing Change: n/a    Electronic Signatures:  Rhiannon Dickens RN       Last Updated: 1:18 PM by RHIANNON DICKENS

## 2025-02-10 NOTE — PROCEDURES
"Patient seen on dialysis.  F160 x 3.5 hours, BFR//600 mL/minute, 3K bath, 2.5 calcium with a target UF set for 2 L as tolerated.  Her hemoglobin is 11 therefore no need for erythropoietin.  Phosphorus binders ordered.  Tolerating dialysis, continue per orders.  Antimicrobial coverage is noted.    /57 (BP Location: Right arm, Patient Position: Lying)   Pulse 77   Temp 36.2 °C (97.2 °F) (Temporal)   Resp 24   Ht 1.676 m (5' 6\")   Wt 54.9 kg (121 lb)   SpO2 95%   BMI 19.53 kg/m²       No current facility-administered medications on file prior to encounter.     Current Outpatient Medications on File Prior to Encounter   Medication Sig Dispense Refill    albuterol (ProAir HFA) 90 mcg/actuation inhaler Inhale 2 puffs every 4 hours if needed for wheezing or shortness of breath. 8.5 g 5    [] amoxicillin (Amoxil) 500 mg capsule Take 1 capsule (500 mg) by mouth every 12 hours for 7 days. 14 capsule 0    aspirin 81 mg EC tablet Take 1 tablet (81 mg) by mouth once daily.      atorvastatin (Lipitor) 80 mg tablet Take 1 tablet (80 mg) by mouth once daily. 90 tablet 2    benzonatate (Tessalon) 100 mg capsule Take 1 capsule (100 mg) by mouth 3 times a day as needed for cough. Do not crush or chew. 15 capsule 0    carvedilol (Coreg) 6.25 mg tablet Take 1 tablet (6.25 mg) by mouth 2 times a day. 360 tablet 2    gabapentin (Neurontin) 100 mg capsule Take 1 capsule (100 mg) by mouth once daily at bedtime. (Patient not taking: Reported on 2025) 90 capsule 3    gabapentin (Neurontin) 100 mg capsule Take 1 capsule (100 mg) by mouth 3 times a day. 90 capsule 3    isosorbide mononitrate ER (Imdur) 30 mg 24 hr tablet TAKE 1 TABLET(30 MG) BY MOUTH DAILY. DO NOT CRUSH OR CHEW (Patient not taking: Reported on 2025) 90 tablet 1    isosorbide mononitrate ER (Imdur) 60 mg 24 hr tablet Take 1 tablet (60 mg) by mouth once daily. 90 tablet 3    magnesium oxide (Mag-Ox) 400 mg (241.3 mg magnesium) tablet Take 1 " tablet (400 mg) by mouth once daily.      melatonin 3 mg tablet Take 1 tablet (3 mg) by mouth as needed at bedtime for sleep.      mirtazapine (Remeron) 7.5 mg tablet TAKE 1 TABLET(7.5 MG) BY MOUTH DAILY AT BEDTIME (Patient not taking: Reported on 2/5/2025) 90 tablet 1    mirtazapine (Remeron) 7.5 mg tablet Take 1 tablet (7.5 mg) by mouth once daily at bedtime. 30 tablet 2    pantoprazole (ProtoNix) 40 mg EC tablet TAKE 1 TABLET BY MOUTH EVERY MORNING BEFORE EATING (Patient taking differently: Take 1 tablet (40 mg) by mouth once daily in the morning. Take before meals. TAKE 1 TABLET BY MOUTH EVERY MORNING BEFORE EATING) 270 tablet 1    sevelamer HCl (Renagel) 800 mg tablet Take 1 tablet (800 mg) by mouth once daily. Before meals (Patient taking differently: Take 1 tablet (800 mg) by mouth 2 times a day. Before meals) 30 tablet 3    tiotropium (Spiriva with HandiHaler) 18 mcg inhalation capsule Place 1 capsule (18 mcg) into inhaler and inhale once daily at bedtime. (Patient not taking: Reported on 2/5/2025) 30 capsule 11

## 2025-02-10 NOTE — PROGRESS NOTES
"                                                                                                               '' Infectious Disease Progress Note''        Interval Events:  No new symptoms  Afebrile  Blood culture remains negative  WBC 6K      Current antibiotic:  Ceftriaxone  Azithromycin    Physical Exam:  /57 (BP Location: Right arm, Patient Position: Lying)   Pulse 77   Temp 36.2 °C (97.2 °F) (Temporal)   Resp 24   Ht 1.676 m (5' 6\")   Wt 54.9 kg (121 lb)   SpO2 95%   BMI 19.53 kg/m²   General: NAD, nontoxic appearing  Skin: no new rash  Resp: Bibasilar diminished breathing sounds  CV:  normal S1/S2, no murmur   Abd: soft, non-tender  Ext: no edema      Lab Results:  Reviewed      Imaging: reviewed         Assessment:    -Right pneumonia  -Leukocytosis: Resolved  -ESRD on HD    Plan/Recommendations:  -Continue ceftriaxone azithromycin thru tomorrow  -ID is signing off but please call me with any questions or concerns.      Please call ID with any concerns or questions.      Adam Cruz MD  [facility], [place]  #[phone]      "

## 2025-02-11 ENCOUNTER — APPOINTMENT (OUTPATIENT)
Dept: PRIMARY CARE | Facility: CLINIC | Age: 85
End: 2025-02-11
Payer: COMMERCIAL

## 2025-02-11 VITALS
HEIGHT: 66 IN | WEIGHT: 121 LBS | SYSTOLIC BLOOD PRESSURE: 121 MMHG | BODY MASS INDEX: 19.44 KG/M2 | TEMPERATURE: 98.1 F | RESPIRATION RATE: 20 BRPM | HEART RATE: 88 BPM | OXYGEN SATURATION: 97 % | DIASTOLIC BLOOD PRESSURE: 62 MMHG

## 2025-02-11 LAB
ANION GAP SERPL CALC-SCNC: 12 MMOL/L (ref 10–20)
BASOPHILS # BLD AUTO: 0.05 X10*3/UL (ref 0–0.1)
BASOPHILS NFR BLD AUTO: 0.7 %
BUN SERPL-MCNC: 11 MG/DL (ref 6–23)
CALCIUM SERPL-MCNC: 9.2 MG/DL (ref 8.6–10.3)
CHLORIDE SERPL-SCNC: 100 MMOL/L (ref 98–107)
CO2 SERPL-SCNC: 28 MMOL/L (ref 21–32)
CREAT SERPL-MCNC: 4.13 MG/DL (ref 0.5–1.05)
EGFRCR SERPLBLD CKD-EPI 2021: 10 ML/MIN/1.73M*2
EOSINOPHIL # BLD AUTO: 0.14 X10*3/UL (ref 0–0.4)
EOSINOPHIL NFR BLD AUTO: 2 %
ERYTHROCYTE [DISTWIDTH] IN BLOOD BY AUTOMATED COUNT: 18.6 % (ref 11.5–14.5)
GLUCOSE BLD MANUAL STRIP-MCNC: 108 MG/DL (ref 74–99)
GLUCOSE BLD MANUAL STRIP-MCNC: 65 MG/DL (ref 74–99)
GLUCOSE SERPL-MCNC: 70 MG/DL (ref 74–99)
HCT VFR BLD AUTO: 38.3 % (ref 36–46)
HGB BLD-MCNC: 11.6 G/DL (ref 12–16)
HOLD SPECIMEN: NORMAL
IMM GRANULOCYTES # BLD AUTO: 0.03 X10*3/UL (ref 0–0.5)
IMM GRANULOCYTES NFR BLD AUTO: 0.4 % (ref 0–0.9)
LYMPHOCYTES # BLD AUTO: 0.84 X10*3/UL (ref 0.8–3)
LYMPHOCYTES NFR BLD AUTO: 12.1 %
MCH RBC QN AUTO: 27.6 PG (ref 26–34)
MCHC RBC AUTO-ENTMCNC: 30.3 G/DL (ref 32–36)
MCV RBC AUTO: 91 FL (ref 80–100)
MONOCYTES # BLD AUTO: 0.84 X10*3/UL (ref 0.05–0.8)
MONOCYTES NFR BLD AUTO: 12.1 %
NEUTROPHILS # BLD AUTO: 5.04 X10*3/UL (ref 1.6–5.5)
NEUTROPHILS NFR BLD AUTO: 72.7 %
NRBC BLD-RTO: 0 /100 WBCS (ref 0–0)
PLATELET # BLD AUTO: 127 X10*3/UL (ref 150–450)
POTASSIUM SERPL-SCNC: 3.6 MMOL/L (ref 3.5–5.3)
RBC # BLD AUTO: 4.21 X10*6/UL (ref 4–5.2)
SODIUM SERPL-SCNC: 136 MMOL/L (ref 136–145)
WBC # BLD AUTO: 6.9 X10*3/UL (ref 4.4–11.3)

## 2025-02-11 PROCEDURE — 2500000004 HC RX 250 GENERAL PHARMACY W/ HCPCS (ALT 636 FOR OP/ED)

## 2025-02-11 PROCEDURE — 2500000002 HC RX 250 W HCPCS SELF ADMINISTERED DRUGS (ALT 637 FOR MEDICARE OP, ALT 636 FOR OP/ED): Performed by: INTERNAL MEDICINE

## 2025-02-11 PROCEDURE — 82374 ASSAY BLOOD CARBON DIOXIDE: CPT

## 2025-02-11 PROCEDURE — 85025 COMPLETE CBC W/AUTO DIFF WBC: CPT

## 2025-02-11 PROCEDURE — 2500000004 HC RX 250 GENERAL PHARMACY W/ HCPCS (ALT 636 FOR OP/ED): Performed by: INTERNAL MEDICINE

## 2025-02-11 PROCEDURE — 94640 AIRWAY INHALATION TREATMENT: CPT

## 2025-02-11 PROCEDURE — 36415 COLL VENOUS BLD VENIPUNCTURE: CPT

## 2025-02-11 PROCEDURE — 2500000005 HC RX 250 GENERAL PHARMACY W/O HCPCS: Performed by: INTERNAL MEDICINE

## 2025-02-11 PROCEDURE — 2500000001 HC RX 250 WO HCPCS SELF ADMINISTERED DRUGS (ALT 637 FOR MEDICARE OP): Performed by: INTERNAL MEDICINE

## 2025-02-11 PROCEDURE — 82947 ASSAY GLUCOSE BLOOD QUANT: CPT

## 2025-02-11 PROCEDURE — 2500000001 HC RX 250 WO HCPCS SELF ADMINISTERED DRUGS (ALT 637 FOR MEDICARE OP)

## 2025-02-11 RX ORDER — MIRTAZAPINE 15 MG/1
15 TABLET, FILM COATED ORAL NIGHTLY
Status: DISCONTINUED | OUTPATIENT
Start: 2025-02-11 | End: 2025-02-11 | Stop reason: HOSPADM

## 2025-02-11 RX ORDER — AZITHROMYCIN 500 MG/1
500 TABLET, FILM COATED ORAL EVERY 24 HOURS
Status: DISCONTINUED | OUTPATIENT
Start: 2025-02-11 | End: 2025-02-11

## 2025-02-11 RX ORDER — AZITHROMYCIN 500 MG/1
500 TABLET, FILM COATED ORAL EVERY 24 HOURS
Status: COMPLETED | OUTPATIENT
Start: 2025-02-11 | End: 2025-02-11

## 2025-02-11 RX ADMIN — IPRATROPIUM BROMIDE AND ALBUTEROL SULFATE 3 ML: 2.5; .5 SOLUTION RESPIRATORY (INHALATION) at 07:20

## 2025-02-11 RX ADMIN — CARVEDILOL 6.25 MG: 6.25 TABLET, FILM COATED ORAL at 08:28

## 2025-02-11 RX ADMIN — ISOSORBIDE MONONITRATE 60 MG: 30 TABLET, EXTENDED RELEASE ORAL at 08:28

## 2025-02-11 RX ADMIN — SEVELAMER CARBONATE 800 MG: 800 TABLET, FILM COATED ORAL at 08:28

## 2025-02-11 RX ADMIN — CEFTRIAXONE 2 G: 2 INJECTION, POWDER, FOR SOLUTION INTRAMUSCULAR; INTRAVENOUS at 13:17

## 2025-02-11 RX ADMIN — Medication 1 L/MIN: at 07:20

## 2025-02-11 RX ADMIN — GABAPENTIN 100 MG: 100 CAPSULE ORAL at 08:28

## 2025-02-11 RX ADMIN — ATORVASTATIN CALCIUM 80 MG: 80 TABLET, FILM COATED ORAL at 08:29

## 2025-02-11 RX ADMIN — HEPARIN SODIUM 5000 UNITS: 5000 INJECTION INTRAVENOUS; SUBCUTANEOUS at 06:01

## 2025-02-11 RX ADMIN — ASPIRIN 81 MG: 81 TABLET, COATED ORAL at 08:28

## 2025-02-11 RX ADMIN — HEPARIN SODIUM 5000 UNITS: 5000 INJECTION INTRAVENOUS; SUBCUTANEOUS at 13:16

## 2025-02-11 RX ADMIN — SEVELAMER CARBONATE 800 MG: 800 TABLET, FILM COATED ORAL at 11:05

## 2025-02-11 RX ADMIN — AZITHROMYCIN 500 MG: 500 TABLET, FILM COATED ORAL at 13:16

## 2025-02-11 ASSESSMENT — COGNITIVE AND FUNCTIONAL STATUS - GENERAL
DRESSING REGULAR LOWER BODY CLOTHING: TOTAL
MOVING TO AND FROM BED TO CHAIR: TOTAL
STANDING UP FROM CHAIR USING ARMS: TOTAL
DAILY ACTIVITIY SCORE: 6
WALKING IN HOSPITAL ROOM: TOTAL
MOBILITY SCORE: 11
CLIMB 3 TO 5 STEPS WITH RAILING: TOTAL
DRESSING REGULAR UPPER BODY CLOTHING: TOTAL
TOILETING: TOTAL
PERSONAL GROOMING: TOTAL
HELP NEEDED FOR BATHING: TOTAL
EATING MEALS: TOTAL
TURNING FROM BACK TO SIDE WHILE IN FLAT BAD: A LITTLE

## 2025-02-11 ASSESSMENT — PAIN - FUNCTIONAL ASSESSMENT: PAIN_FUNCTIONAL_ASSESSMENT: 0-10

## 2025-02-11 ASSESSMENT — PAIN SCALES - GENERAL: PAINLEVEL_OUTOF10: 0 - NO PAIN

## 2025-02-11 NOTE — CARE PLAN
The patient's goals for the shift include wants to feel better and go home, have dialysis    The clinical goals for the shift include Pt will eat at least 50% of meals    Over the shift, the patient did not make progress toward the following goals. Barriers to progression include . Recommendations to address these barriers include .promote good nutrition

## 2025-02-11 NOTE — DISCHARGE SUMMARY
Discharge Diagnosis  Pneumonia of right lower lobe due to infectious organism    Issues Requiring Follow-Up  ***    Test Results Pending At Discharge  Pending Labs       No current pending labs.            Hospital Course   ***    Pertinent Physical Exam At Time of Discharge  Physical Exam    Home Medications     Medication List      ASK your doctor about these medications     albuterol 90 mcg/actuation inhaler; Commonly known as: ProAir HFA;   Inhale 2 puffs every 4 hours if needed for wheezing or shortness of   breath.   amoxicillin 500 mg capsule; Commonly known as: Amoxil; Take 1 capsule   (500 mg) by mouth every 12 hours for 7 days.; Ask about: Should I take   this medication?   aspirin 81 mg EC tablet   atorvastatin 80 mg tablet; Commonly known as: Lipitor; Take 1 tablet (80   mg) by mouth once daily.   benzonatate 100 mg capsule; Commonly known as: Tessalon; Take 1 capsule   (100 mg) by mouth 3 times a day as needed for cough. Do not crush or chew.   carvedilol 6.25 mg tablet; Commonly known as: Coreg; Take 1 tablet (6.25   mg) by mouth 2 times a day.   doxycycline 100 mg tablet; Commonly known as: Adoxa; Take 1 tablet (100   mg) by mouth 2 times a day for 5 days. Take with a full glass of water and   do not lie down for at least 30 minutes after. Complete full course even   if symptoms improve.; Ask about: Should I take this medication?   * gabapentin 100 mg capsule; Commonly known as: Neurontin; Take 1   capsule (100 mg) by mouth once daily at bedtime.   * gabapentin 100 mg capsule; Commonly known as: Neurontin; Take 1   capsule (100 mg) by mouth 3 times a day.   * isosorbide mononitrate ER 60 mg 24 hr tablet; Commonly known as:   Imdur; Take 1 tablet (60 mg) by mouth once daily.   * isosorbide mononitrate ER 30 mg 24 hr tablet; Commonly known as:   Imdur; TAKE 1 TABLET(30 MG) BY MOUTH DAILY. DO NOT CRUSH OR CHEW   magnesium oxide 400 mg (241.3 mg magnesium) tablet; Commonly known as:   Mag-Ox   melatonin 3  mg tablet   * mirtazapine 7.5 mg tablet; Commonly known as: Remeron; TAKE 1   TABLET(7.5 MG) BY MOUTH DAILY AT BEDTIME   * mirtazapine 7.5 mg tablet; Commonly known as: Remeron; Take 1 tablet   (7.5 mg) by mouth once daily at bedtime.   pantoprazole 40 mg EC tablet; Commonly known as: ProtoNix; TAKE 1 TABLET   BY MOUTH EVERY MORNING BEFORE EATING   sevelamer HCl 800 mg tablet; Commonly known as: Renagel; Take 1 tablet   (800 mg) by mouth once daily. Before meals   tiotropium 18 mcg inhalation capsule; Commonly known as: Spiriva with   HandiHaler; Place 1 capsule (18 mcg) into inhaler and inhale once daily at   bedtime.  * This list has 6 medication(s) that are the same as other medications   prescribed for you. Read the directions carefully, and ask your doctor or   other care provider to review them with you.       Outpatient Follow-Up  Future Appointments   Date Time Provider Department Center   2/11/2025  3:30 PM Chanelle Corado MD [id number] [facility]       Chanelle Corado MD

## 2025-02-11 NOTE — PROGRESS NOTES
02/11/25 1054   Discharge Planning   Expected Discharge Disposition SNF     Spoke to patients son Calin, he is aware [facility] can accept  He is aware that patient may be ready for dc later today.  Reports of diarrhea, Dr Corado would like stool sample before patient is dc'd, he states patient can leave if SNF is ok with following up with results after discharge.   This TCC has message out to SNF regarding this above, bedside nurse did state that patient had very small BM with no odor. Bedside RN states she will get stool sample if patient has BM again.    ADOD today?  BARRIERS stool sample?  SNF decision if patient can admit after stool sample before results?  [facility] SN[facility]- have HD approval    Son aware of dc time and place, along with , and bedside RN and provider.

## 2025-02-12 ENCOUNTER — NURSING HOME VISIT (OUTPATIENT)
Dept: POST ACUTE CARE | Facility: EXTERNAL LOCATION | Age: 85
End: 2025-02-12
Payer: COMMERCIAL

## 2025-02-12 DIAGNOSIS — I10 ESSENTIAL HYPERTENSION: Chronic | ICD-10-CM

## 2025-02-12 DIAGNOSIS — Z74.09 IMPAIRED FUNCTIONAL MOBILITY, BALANCE, GAIT, AND ENDURANCE: ICD-10-CM

## 2025-02-12 DIAGNOSIS — Z99.2 END-STAGE RENAL DISEASE ON HEMODIALYSIS (MULTI): ICD-10-CM

## 2025-02-12 DIAGNOSIS — J44.9 CHRONIC OBSTRUCTIVE PULMONARY DISEASE, UNSPECIFIED COPD TYPE (MULTI): ICD-10-CM

## 2025-02-12 DIAGNOSIS — J18.9 PNEUMONIA OF RIGHT LOWER LOBE DUE TO INFECTIOUS ORGANISM: Primary | ICD-10-CM

## 2025-02-12 DIAGNOSIS — N18.6 END-STAGE RENAL DISEASE ON HEMODIALYSIS (MULTI): ICD-10-CM

## 2025-02-12 PROCEDURE — 99310 SBSQ NF CARE HIGH MDM 45: CPT | Performed by: PHYSICIAN ASSISTANT

## 2025-02-12 NOTE — LETTER
Patient: Alyce Dougherty  : 1940    Encounter Date: 2025  Name: Alyce Dougherty  YOB: 1940    Chief complaint: Community acquired pneumonia.    HPI: This is a 84 year old  female who has a medical history significant for ESRD on hemodialysis, hypertension, pulmonary hypertension, malignant neoplasm of breast, acute and chronic systolic heart failure, cardiomegaly, diverticulosis, and pneumonia. Patient presented to the ER with complaints of upper upper respiratory symptoms of cough and shortness of breath. Chest x-ray showed Patchy right basilar consolidation. Cardiomegaly. Diffuse pulmonary  vascular congestion. No apparent pleural effusion. Aortic atherosclerosis with tortuosity.. WBC elevated at 13.5. She was diagnosed with community-acquired pneumonia and discharged on oral amoxicillin and doxycycline. Patient returned with worsening cough and shortness of breath. Chest x-ray showed Right basilar consolidation slightly worsened from prior with slight interval increase of a right-sided parapneumonic effusion. She was treated with Rocephin and Azithromycin with improvement. Blood cultures were negative. She was discharged to SNF for rehab.    Pneumonia  She complains of cough and shortness of breath. There is no difficulty breathing or hemoptysis. This is a new problem. The current episode started in the past 7 days. The problem has been gradually improving. The cough is non-productive. Pertinent negatives include no chest pain, dyspnea on exertion, ear congestion, ear pain, fever, nasal congestion or sore throat. Her symptoms are aggravated by strenuous activity. Relieved by: antibiotics. She reports moderate improvement on treatment. Her past medical history is significant for COPD.     Review of systems:   ROS negative except were noted in HPI.    Code Status: full code    BP (!) 102/49   Pulse 72   Temp 37.1 °C (98.8 °F)   Resp 18   Ht 1.676 m (5'  "6\")   Wt 54.9 kg (121 lb)   SpO2 93%   BMI 19.53 kg/m²      Physical Exam  Constitutional:       General: She is not in acute distress.  HENT:      Head: Normocephalic.      Nose: Nose normal.      Mouth/Throat:      Mouth: Mucous membranes are moist.   Eyes:      Extraocular Movements: Extraocular movements intact.      Pupils: Pupils are equal, round, and reactive to light.   Cardiovascular:      Rate and Rhythm: Normal rate and regular rhythm.      Pulses: Normal pulses.      Heart sounds: Normal heart sounds.      Comments: L arm AV fistula with positive bruit.  Pulmonary:      Effort: Pulmonary effort is normal.      Breath sounds: Normal breath sounds.      Comments: Wearing oxygen at 3 liters per nasal cannula.  Abdominal:      General: Bowel sounds are normal. There is no distension.      Palpations: Abdomen is soft.      Tenderness: There is no abdominal tenderness. There is no guarding.   Genitourinary:     Comments: Hemodialysis patient.  Musculoskeletal:         General: Normal range of motion.      Cervical back: Normal range of motion.   Skin:     General: Skin is warm and dry.      Capillary Refill: Capillary refill takes less than 2 seconds.   Neurological:      General: No focal deficit present.      Mental Status: She is alert and oriented to person, place, and time.   Psychiatric:         Mood and Affect: Mood normal.         Behavior: Behavior normal.        Medications reviewed during visit at facility.  Isosorbide ER 60 mg po daily  Magnesium 400 mg po daily  Coreg 6.25 mg po daily  Gabapentin 100 mg po tid  Mirtazapine 7.5 mg po daily  Atorvastatin 80 mg po daily  Sevelamer 800 mg po with meals  Tylenol 650 mg po q 4 hours prn  Albuterol-Budesonide Inhalation Aerosol 90-80 MCG/ACT two puffs q 4 hours prn  Benzonatate Oral Capsule 100 mg po q 8 hours prn  Melatonin 3 mg po q hs  Pantoprazole 40 mg po daily  Aspirin 81 mg po daily  Sevelamer 800 mg po with meals  Labs reviewed at facility:   " Contains abnormal data CBC and Auto Differential  Order: [id number]   Status: Final result       Visible to patient: No (inaccessible in [facility])    0 Result Notes            Component  Ref Range & Units 3 d ago 4 d ago 5 d ago 6 d ago 7 d ago 8 d ago 9 d ago   WBC  4.4 - 11.3 x10*3/uL 6.9 6.7 9.3 9.7 13.5 High  4.5 5.4   nRBC  0.0 - 0.0 /100 WBCs 0.0 0.0 0.0 0.0 0.0 0.0 0.0   RBC  4.00 - 5.20 x10*6/uL 4.21 3.95 Low  3.88 Low  3.98 Low  3.95 Low  4.18 4.31   Hemoglobin  12.0 - 16.0 g/dL 11.6 Low  11.0 Low  10.8 Low  11.3 Low  11.1 Low  11.7 Low  12.1   Hematocrit  36.0 - 46.0 % 38.3 34.1 Low  36.2 37.7 34.5 Low  37.3 37.5   MCV  80 - 100 fL 91 86 93 95 87 89 87   MCH  26.0 - 34.0 pg 27.6 27.8 27.8 28.4 28.1 28.0 28.1   MCHC  32.0 - 36.0 g/dL 30.3 Low  32.3 29.8 Low  30.0 Low  32.2 31.4 Low  32.3   RDW  11.5 - 14.5 % 18.6 High  18.1 High  18.6 High  18.6 High  18.0 High  18.3 High  17.8 High    Platelets  150 - 450 x10*3/uL 127 Low  119 Low  109 Low  103 Low  123 Low  118 Low  143 Low    Neutrophils %  40.0 - 80.0 % 72.7 76.3 80.0 82.4 84.7 69.8 69.5   Immature Granulocytes %, Automated  0.0 - 0.9 % 0.4 0.4 CM 0.4 CM 0.3 CM 0.7 CM 0.7 CM 0.4 CM   Comment: Immature Granulocyte Count (IG) includes promyelocytes, myelocytes and metamyelocytes but does not include bands. Percent differential counts (%) should be interpreted in the context of the absolute cell counts (cells/UL).   Lymphocytes %  13.0 - 44.0 % 12.1 9.6 9.5 7.5 6.5 13.7 15.9   Monocytes %  2.0 - 10.0 % 12.1 10.2 7.7 8.4 7.2 10.9 9.4   Eosinophils %  0.0 - 6.0 % 2.0 2.8 2.0 1.0 0.7 4.2 3.9   Basophils %  0.0 - 2.0 % 0.7 0.7 0.4 0.4 0.2 0.7 0.9   Neutrophils Absolute  1.60 - 5.50 x10*3/uL 5.04 5.13 CM 7.44 High  CM 7.99 High  CM 11.42 High  CM 3.15 CM 3.75 CM   Comment: Percent differential counts (%) should be interpreted in the context of the absolute cell counts (cells/uL).   Immature Granulocytes Absolute, Automated  0.00 - 0.50 x10*3/uL 0.03 0.03 0.04  0.03 0.09 0.03 0.02   Lymphocytes Absolute  0.80 - 3.00 x10*3/uL 0.84 0.65 Low  0.88 0.73 Low  0.87 0.62 Low  0.86   Monocytes Absolute  0.05 - 0.80 x10*3/uL 0.84 High  0.69 0.72 0.82 High  0.97 High  0.49 0.51   Eosinophils Absolute  0.00 - 0.40 x10*3/uL 0.14 0.19 0.19 0.10 0.09 0.19 0.21   Basophils Absolute  0.00 - 0.10 x10*3/uL 0.05 0.05 0.04 0.04 0.03 0.03 0.05   Resulting Agency [facility]             Specimen Collected: 02/11/25 05:36 Last Resulted: 02/11/25 07:31   Contains abnormal data Basic Metabolic Panel  Order: [id number]   Status: Final result       Visible to patient: No (inaccessible in [facility])    0 Result Notes       2 HM Topics            Component  Ref Range & Units 3 d ago 4 d ago 5 d ago 6 d ago 7 d ago 8 d ago 9 d ago   Glucose  74 - 99 mg/dL 70 Low  52 Low Panic  76 73 Low  64 Low  79 113 High    Sodium  136 - 145 mmol/L 136 137 135 Low  136 135 Low  134 Low  132 Low    Potassium  3.5 - 5.3 mmol/L 3.6 3.9 3.7 3.9 CM 3.9 3.7 3.9   Chloride  98 - 107 mmol/L 100 100 100 99 97 Low  96 Low  94 Low    Bicarbonate  21 - 32 mmol/L 28 27 30 30 28 28 26   Anion Gap  10 - 20 mmol/L 12 14 9 Low  11 14 14 16   Urea Nitrogen  6 - 23 mg/dL 11 24 High  19 13 31 High  25 High  47 High    Creatinine  0.50 - 1.05 mg/dL 4.13 High  6.99 High  5.70 High  4.41 High  7.42 High  6.43 High  9.35 High    eGFR  >60 mL/min/1.73m*2 10 Low  5 Low  CM 7 Low  CM 9 Low  CM 5 Low  CM 6 Low  CM 4 Low  CM   Comment: Calculations of estimated GFR are performed using the 2021 CKD-EPI Study Refit equation without the race variable for the IDMS-Traceable creatinine methods.  https://jasn.asnjournals.org/content/early/2021/09/22/ASN.4382087481   Calcium  8.6 - 10.3 mg/dL 9.2 9.0 9.3 9.0 9.1 8.8 9.1   Resulting Agency [facility]             Specimen Collected: 02/11/25 05:36 Last Resulted: 02/11/25 08:01       Assessment/Plan   Problem List Items Addressed This Visit       Essential hypertension  (Chronic)     Review BP readings. Continue with Coreg 6.25 mg po bid.         Chronic obstructive pulmonary disease (Multi)     Albuterol-Budesonide Inhalation Aerosol 90-80 MCG/ACT two puffs q 4 hours prn. Supplemental oxygen.         End-stage renal disease on hemodialysis (Multi)     Routine hemodialysis Mon, Weds, and Friday. Monitor L arm AV fistula for signs of bleeding or infection.         Pneumonia of right lower lobe due to infectious organism - Primary     Completed IV antibiotics. Monitor BMP CBC with diff q Tuesday. Review labs. Oxygen at 3 liters per nasal cannula.         Impaired functional mobility, balance, gait, and endurance     PT and OT to assess and treat.            Time:  I spent 45 minutes or greater with the patient. Greater than 50% of this time was spent in counseling and or coordination of care. The time includes prep time of reviewing vital signs, report from direct nursing staff and or therapists, hospital documentation, reviewing labs, radiographs, diagnostic tests and or consultations, time directly spent with the patient interviewing, examining, and education regarding diagnosis, treatments, and medications, as well as documentation in the electronic medical record, and reviewing the plan of care and any new orders with the patient, nursing staff and other staff directly related to the patients care.      Jake Cisneros PA-C       Electronically Signed By: Jake Cisneros PA-C   2/14/25 10:38 AM

## 2025-02-14 ENCOUNTER — NURSING HOME VISIT (OUTPATIENT)
Dept: POST ACUTE CARE | Facility: EXTERNAL LOCATION | Age: 85
End: 2025-02-14
Payer: COMMERCIAL

## 2025-02-14 VITALS
OXYGEN SATURATION: 93 % | TEMPERATURE: 98.8 F | HEIGHT: 66 IN | WEIGHT: 121 LBS | RESPIRATION RATE: 18 BRPM | HEART RATE: 72 BPM | SYSTOLIC BLOOD PRESSURE: 102 MMHG | DIASTOLIC BLOOD PRESSURE: 49 MMHG | BODY MASS INDEX: 19.44 KG/M2

## 2025-02-14 DIAGNOSIS — I50.42 CHRONIC COMBINED SYSTOLIC AND DIASTOLIC CONGESTIVE HEART FAILURE: Primary | Chronic | ICD-10-CM

## 2025-02-14 DIAGNOSIS — J18.9 COMMUNITY ACQUIRED PNEUMONIA OF RIGHT LUNG, UNSPECIFIED PART OF LUNG: ICD-10-CM

## 2025-02-14 PROBLEM — Z74.09 IMPAIRED FUNCTIONAL MOBILITY, BALANCE, GAIT, AND ENDURANCE: Status: ACTIVE | Noted: 2025-02-14

## 2025-02-14 PROCEDURE — 99306 1ST NF CARE HIGH MDM 50: CPT | Performed by: INTERNAL MEDICINE

## 2025-02-14 ASSESSMENT — ENCOUNTER SYMPTOMS
FEVER: 0
DIFFICULTY BREATHING: 0
HEMOPTYSIS: 0
SORE THROAT: 0
DYSPNEA ON EXERTION: 0
COUGH: 1
SHORTNESS OF BREATH: 1

## 2025-02-14 ASSESSMENT — COPD QUESTIONNAIRES: COPD: 1

## 2025-02-14 NOTE — ASSESSMENT & PLAN NOTE
Completed IV antibiotics. Monitor BMP CBC with diff q Tuesday. Review labs. Oxygen at 3 liters per nasal cannula.

## 2025-02-14 NOTE — ASSESSMENT & PLAN NOTE
Routine hemodialysis Mon, Weds, and Friday. Monitor L arm AV fistula for signs of bleeding or infection.

## 2025-02-14 NOTE — LETTER
Patient: Alyce Dougherty  : 1940    Encounter Date: 2025    Subjective  Patient ID: Alyce Dougherty is a 84 y.o. female who presents for No chief complaint on file..  HPI  Past medical history hypertension hyperlipidemia ESRD osteoarthritis diastolic CHF PAD anemia breast cancer status postlumpectomy history of pelvic fracture  Recent hospital admission dyspnea right-sided pneumonia Ceftriaxone azithromycin COVID-negative    Patient states overall feels well she just has arm pain on the left side because she states she did not receive from nursing the topical anesthetic prior she usually gets it 30 minutes prior then its numb prior to dialysis this time they put her on right before otherwise feels fine  Denies swelling fever chills redness paralysis paresthesias              Health Maintenance:      Colonoscopy:      Mammogram:      Pelvic/Pap:      Low dose chest CT:      Aorta duplex:      Optho:      Podiatry:        Vaccines:      Refer to Epic Vaccination Log        ROS:      General: denies fever/chills/weight loss      Head: denies HA/trauma/masses/dizziness      Eyes: denies vision change/loss of vision/blurry vision/diplopia/eye pain      Ears: denies hearing loss/tinnitus/otalgia/otorrhea      Nose: denies nasal drainage/anosmia      Throat: denies dysphagia/odynophagia      Lymphatics: denies lymph node swelling      Breast: denies masses/discharge/dimpling/skin changes      Cardiac: denies CP/palpitations/orthopnea/PND      Pulmonary: denies dyspnea/cough/wheezing      GI: denies abd pain/n/v/diarrhea/melena/hematochezia/hematemesis      : denies dysuria/hematuria/change frequency      Genital: denies genital discharge/lesions      Skin: denies rashes/lesions/masses      MSK: Some soreness at the left dialysis site due to the pinprick otherwise feels fine denies weakness/swelling/edema/gait imbalance/pain      Neuro: denies paresthesias/seizures/dysarthria      Psych: denies  "depression/anxiety/suicidal or homicidal ideations            Objective  There were no vitals taken for this visit.     Physical Exam:     General: AO3, NAD     Head: atraumatic/NC     Eyes: EOMI/PERRLA. Negative APD     Ears: TM pearly gray, EAC clear. No lesions or erythema     Nose: symmetric nares, no discharge     Throat: trachea midline, uvula midline pink mucosa. No thyromegaly     Lymphatics: no cervical/supraclavicular/ant or posterior cervical adenopathy/axillary/inguinal adenopathy     Breast: not examined     Chest: no deformity or tenderness to palpation     Pulm: CTA b/l, no wheeze/rhonchi/rales. nonlabored     Cardiac: RRR +s1s2, no m/r/g.      GI: soft, NT/ND. Normoactive Bsx4. No rebound/guarding.     Rectal: not examined     Genital: not examined     MSK: Positive thrill and bruit left upper extremity site clean dry intact 5/5 strength UE LE. No edema/clubbing/cyanosis     Skin: no rashes/lesions     Vascular: 2+ palp DP PT radials b/l. Negative carotid bruit     Neuro: CNII-XII intact. No focal deficits. Reflexes 2/4 brachioradialis bicep tricep patellar achilles. Finger to nose intact.     Psych: appropriate mood/affect                    No results found for: \"BMPR1A\", \"CBCDIF\"      Assessment/Plan  Diagnoses and all orders for this visit:  Chronic combined systolic and diastolic congestive heart failure  Community acquired pneumonia of right lung, unspecified part of lung    Order written to apply topical anesthetic 60 minutes prior to dialysis sessions for patient comfort discussed with nursing team    PT OT    Brodie Knowles DO, FACLEA Knowles DO      Electronically Signed By: Brodie Knowles DO   2/14/25  8:46 PM  "

## 2025-02-14 NOTE — PROGRESS NOTES
"2/12/2025  Name: Alyce Dougherty  YOB: 1940    Chief complaint: Community acquired pneumonia.    HPI: This is a 84 year old  female who has a medical history significant for ESRD on hemodialysis, hypertension, pulmonary hypertension, malignant neoplasm of breast, acute and chronic systolic heart failure, cardiomegaly, diverticulosis, and pneumonia. Patient presented to the ER with complaints of upper upper respiratory symptoms of cough and shortness of breath. Chest x-ray showed Patchy right basilar consolidation. Cardiomegaly. Diffuse pulmonary  vascular congestion. No apparent pleural effusion. Aortic atherosclerosis with tortuosity.. WBC elevated at 13.5. She was diagnosed with community-acquired pneumonia and discharged on oral amoxicillin and doxycycline. Patient returned with worsening cough and shortness of breath. Chest x-ray showed Right basilar consolidation slightly worsened from prior with slight interval increase of a right-sided parapneumonic effusion. She was treated with Rocephin and Azithromycin with improvement. Blood cultures were negative. She was discharged to SNF for rehab.    Pneumonia  She complains of cough and shortness of breath. There is no difficulty breathing or hemoptysis. This is a new problem. The current episode started in the past 7 days. The problem has been gradually improving. The cough is non-productive. Pertinent negatives include no chest pain, dyspnea on exertion, ear congestion, ear pain, fever, nasal congestion or sore throat. Her symptoms are aggravated by strenuous activity. Relieved by: antibiotics. She reports moderate improvement on treatment. Her past medical history is significant for COPD.     Review of systems:   ROS negative except were noted in HPI.    Code Status: full code    BP (!) 102/49   Pulse 72   Temp 37.1 °C (98.8 °F)   Resp 18   Ht 1.676 m (5' 6\")   Wt 54.9 kg (121 lb)   SpO2 93%   BMI 19.53 kg/m²      Physical " Exam  Constitutional:       General: She is not in acute distress.  HENT:      Head: Normocephalic.      Nose: Nose normal.      Mouth/Throat:      Mouth: Mucous membranes are moist.   Eyes:      Extraocular Movements: Extraocular movements intact.      Pupils: Pupils are equal, round, and reactive to light.   Cardiovascular:      Rate and Rhythm: Normal rate and regular rhythm.      Pulses: Normal pulses.      Heart sounds: Normal heart sounds.      Comments: L arm AV fistula with positive bruit.  Pulmonary:      Effort: Pulmonary effort is normal.      Breath sounds: Normal breath sounds.      Comments: Wearing oxygen at 3 liters per nasal cannula.  Abdominal:      General: Bowel sounds are normal. There is no distension.      Palpations: Abdomen is soft.      Tenderness: There is no abdominal tenderness. There is no guarding.   Genitourinary:     Comments: Hemodialysis patient.  Musculoskeletal:         General: Normal range of motion.      Cervical back: Normal range of motion.   Skin:     General: Skin is warm and dry.      Capillary Refill: Capillary refill takes less than 2 seconds.   Neurological:      General: No focal deficit present.      Mental Status: She is alert and oriented to person, place, and time.   Psychiatric:         Mood and Affect: Mood normal.         Behavior: Behavior normal.        Medications reviewed during visit at facility.  Isosorbide ER 60 mg po daily  Magnesium 400 mg po daily  Coreg 6.25 mg po daily  Gabapentin 100 mg po tid  Mirtazapine 7.5 mg po daily  Atorvastatin 80 mg po daily  Sevelamer 800 mg po with meals  Tylenol 650 mg po q 4 hours prn  Albuterol-Budesonide Inhalation Aerosol 90-80 MCG/ACT two puffs q 4 hours prn  Benzonatate Oral Capsule 100 mg po q 8 hours prn  Melatonin 3 mg po q hs  Pantoprazole 40 mg po daily  Aspirin 81 mg po daily  Sevelamer 800 mg po with meals  Labs reviewed at facility:   Contains abnormal data CBC and Auto Differential  Order: [id number]    Status: Final result       Visible to patient: No (inaccessible in [facility])    0 Result Notes            Component  Ref Range & Units 3 d ago 4 d ago 5 d ago 6 d ago 7 d ago 8 d ago 9 d ago   WBC  4.4 - 11.3 x10*3/uL 6.9 6.7 9.3 9.7 13.5 High  4.5 5.4   nRBC  0.0 - 0.0 /100 WBCs 0.0 0.0 0.0 0.0 0.0 0.0 0.0   RBC  4.00 - 5.20 x10*6/uL 4.21 3.95 Low  3.88 Low  3.98 Low  3.95 Low  4.18 4.31   Hemoglobin  12.0 - 16.0 g/dL 11.6 Low  11.0 Low  10.8 Low  11.3 Low  11.1 Low  11.7 Low  12.1   Hematocrit  36.0 - 46.0 % 38.3 34.1 Low  36.2 37.7 34.5 Low  37.3 37.5   MCV  80 - 100 fL 91 86 93 95 87 89 87   MCH  26.0 - 34.0 pg 27.6 27.8 27.8 28.4 28.1 28.0 28.1   MCHC  32.0 - 36.0 g/dL 30.3 Low  32.3 29.8 Low  30.0 Low  32.2 31.4 Low  32.3   RDW  11.5 - 14.5 % 18.6 High  18.1 High  18.6 High  18.6 High  18.0 High  18.3 High  17.8 High    Platelets  150 - 450 x10*3/uL 127 Low  119 Low  109 Low  103 Low  123 Low  118 Low  143 Low    Neutrophils %  40.0 - 80.0 % 72.7 76.3 80.0 82.4 84.7 69.8 69.5   Immature Granulocytes %, Automated  0.0 - 0.9 % 0.4 0.4 CM 0.4 CM 0.3 CM 0.7 CM 0.7 CM 0.4 CM   Comment: Immature Granulocyte Count (IG) includes promyelocytes, myelocytes and metamyelocytes but does not include bands. Percent differential counts (%) should be interpreted in the context of the absolute cell counts (cells/UL).   Lymphocytes %  13.0 - 44.0 % 12.1 9.6 9.5 7.5 6.5 13.7 15.9   Monocytes %  2.0 - 10.0 % 12.1 10.2 7.7 8.4 7.2 10.9 9.4   Eosinophils %  0.0 - 6.0 % 2.0 2.8 2.0 1.0 0.7 4.2 3.9   Basophils %  0.0 - 2.0 % 0.7 0.7 0.4 0.4 0.2 0.7 0.9   Neutrophils Absolute  1.60 - 5.50 x10*3/uL 5.04 5.13 CM 7.44 High  CM 7.99 High  CM 11.42 High  CM 3.15 CM 3.75 CM   Comment: Percent differential counts (%) should be interpreted in the context of the absolute cell counts (cells/uL).   Immature Granulocytes Absolute, Automated  0.00 - 0.50 x10*3/uL 0.03 0.03 0.04 0.03 0.09 0.03 0.02   Lymphocytes Absolute  0.80 - 3.00 x10*3/uL  0.84 0.65 Low  0.88 0.73 Low  0.87 0.62 Low  0.86   Monocytes Absolute  0.05 - 0.80 x10*3/uL 0.84 High  0.69 0.72 0.82 High  0.97 High  0.49 0.51   Eosinophils Absolute  0.00 - 0.40 x10*3/uL 0.14 0.19 0.19 0.10 0.09 0.19 0.21   Basophils Absolute  0.00 - 0.10 x10*3/uL 0.05 0.05 0.04 0.04 0.03 0.03 0.05   Resulting Agency [facility]             Specimen Collected: 02/11/25 05:36 Last Resulted: 02/11/25 07:31   Contains abnormal data Basic Metabolic Panel  Order: [id number]   Status: Final result       Visible to patient: No (inaccessible in [facility])    0 Result Notes       2 HM Topics            Component  Ref Range & Units 3 d ago 4 d ago 5 d ago 6 d ago 7 d ago 8 d ago 9 d ago   Glucose  74 - 99 mg/dL 70 Low  52 Low Panic  76 73 Low  64 Low  79 113 High    Sodium  136 - 145 mmol/L 136 137 135 Low  136 135 Low  134 Low  132 Low    Potassium  3.5 - 5.3 mmol/L 3.6 3.9 3.7 3.9 CM 3.9 3.7 3.9   Chloride  98 - 107 mmol/L 100 100 100 99 97 Low  96 Low  94 Low    Bicarbonate  21 - 32 mmol/L 28 27 30 30 28 28 26   Anion Gap  10 - 20 mmol/L 12 14 9 Low  11 14 14 16   Urea Nitrogen  6 - 23 mg/dL 11 24 High  19 13 31 High  25 High  47 High    Creatinine  0.50 - 1.05 mg/dL 4.13 High  6.99 High  5.70 High  4.41 High  7.42 High  6.43 High  9.35 High    eGFR  >60 mL/min/1.73m*2 10 Low  5 Low  CM 7 Low  CM 9 Low  CM 5 Low  CM 6 Low  CM 4 Low  CM   Comment: Calculations of estimated GFR are performed using the 2021 CKD-EPI Study Refit equation without the race variable for the IDMS-Traceable creatinine methods.  https://jasn.asnjournals.org/content/early/2021/09/22/ASN.5679801096   Calcium  8.6 - 10.3 mg/dL 9.2 9.0 9.3 9.0 9.1 8.8 9.1   Resulting Agency [facility]             Specimen Collected: 02/11/25 05:36 Last Resulted: 02/11/25 08:01       Assessment/Plan    Problem List Items Addressed This Visit       Essential hypertension (Chronic)     Review BP readings. Continue with Coreg 6.25 mg po  bid.         Chronic obstructive pulmonary disease (Multi)     Albuterol-Budesonide Inhalation Aerosol 90-80 MCG/ACT two puffs q 4 hours prn. Supplemental oxygen.         End-stage renal disease on hemodialysis (Multi)     Routine hemodialysis Mon, Weds, and Friday. Monitor L arm AV fistula for signs of bleeding or infection.         Pneumonia of right lower lobe due to infectious organism - Primary     Completed IV antibiotics. Monitor BMP CBC with diff q Tuesday. Review labs. Oxygen at 3 liters per nasal cannula.         Impaired functional mobility, balance, gait, and endurance     PT and OT to assess and treat.            Time:  I spent 45 minutes or greater with the patient. Greater than 50% of this time was spent in counseling and or coordination of care. The time includes prep time of reviewing vital signs, report from direct nursing staff and or therapists, hospital documentation, reviewing labs, radiographs, diagnostic tests and or consultations, time directly spent with the patient interviewing, examining, and education regarding diagnosis, treatments, and medications, as well as documentation in the electronic medical record, and reviewing the plan of care and any new orders with the patient, nursing staff and other staff directly related to the patients care.      Jake Cisneros PA-C

## 2025-02-14 NOTE — ASSESSMENT & PLAN NOTE
Albuterol-Budesonide Inhalation Aerosol 90-80 MCG/ACT two puffs q 4 hours prn. Supplemental oxygen.

## 2025-02-15 NOTE — PROGRESS NOTES
Subjective   Patient ID: Alyce Dougherty is a 84 y.o. female who presents for No chief complaint on file..  HPI  Past medical history hypertension hyperlipidemia ESRD osteoarthritis diastolic CHF PAD anemia breast cancer status postlumpectomy history of pelvic fracture  Recent hospital admission dyspnea right-sided pneumonia Ceftriaxone azithromycin COVID-negative    Patient states overall feels well she just has arm pain on the left side because she states she did not receive from nursing the topical anesthetic prior she usually gets it 30 minutes prior then its numb prior to dialysis this time they put her on right before otherwise feels fine  Denies swelling fever chills redness paralysis paresthesias              Health Maintenance:      Colonoscopy:      Mammogram:      Pelvic/Pap:      Low dose chest CT:      Aorta duplex:      Optho:      Podiatry:        Vaccines:      Refer to Epic Vaccination Log        ROS:      General: denies fever/chills/weight loss      Head: denies HA/trauma/masses/dizziness      Eyes: denies vision change/loss of vision/blurry vision/diplopia/eye pain      Ears: denies hearing loss/tinnitus/otalgia/otorrhea      Nose: denies nasal drainage/anosmia      Throat: denies dysphagia/odynophagia      Lymphatics: denies lymph node swelling      Breast: denies masses/discharge/dimpling/skin changes      Cardiac: denies CP/palpitations/orthopnea/PND      Pulmonary: denies dyspnea/cough/wheezing      GI: denies abd pain/n/v/diarrhea/melena/hematochezia/hematemesis      : denies dysuria/hematuria/change frequency      Genital: denies genital discharge/lesions      Skin: denies rashes/lesions/masses      MSK: Some soreness at the left dialysis site due to the pinprick otherwise feels fine denies weakness/swelling/edema/gait imbalance/pain      Neuro: denies paresthesias/seizures/dysarthria      Psych: denies depression/anxiety/suicidal or homicidal ideations            Objective   There were  "no vitals taken for this visit.     Physical Exam:     General: AO3, NAD     Head: atraumatic/NC     Eyes: EOMI/PERRLA. Negative APD     Ears: TM pearly gray, EAC clear. No lesions or erythema     Nose: symmetric nares, no discharge     Throat: trachea midline, uvula midline pink mucosa. No thyromegaly     Lymphatics: no cervical/supraclavicular/ant or posterior cervical adenopathy/axillary/inguinal adenopathy     Breast: not examined     Chest: no deformity or tenderness to palpation     Pulm: CTA b/l, no wheeze/rhonchi/rales. nonlabored     Cardiac: RRR +s1s2, no m/r/g.      GI: soft, NT/ND. Normoactive Bsx4. No rebound/guarding.     Rectal: not examined     Genital: not examined     MSK: Positive thrill and bruit left upper extremity site clean dry intact 5/5 strength UE LE. No edema/clubbing/cyanosis     Skin: no rashes/lesions     Vascular: 2+ palp DP PT radials b/l. Negative carotid bruit     Neuro: CNII-XII intact. No focal deficits. Reflexes 2/4 brachioradialis bicep tricep patellar achilles. Finger to nose intact.     Psych: appropriate mood/affect                    No results found for: \"BMPR1A\", \"CBCDIF\"      Assessment/Plan   Diagnoses and all orders for this visit:  Chronic combined systolic and diastolic congestive heart failure  Community acquired pneumonia of right lung, unspecified part of lung    Order written to apply topical anesthetic 60 minutes prior to dialysis sessions for patient comfort discussed with nursing team    PT OT    Brodie Knowles DO, FACOI       Brodie Knowles DO  "

## 2025-02-24 ENCOUNTER — DOCUMENTATION (OUTPATIENT)
Dept: PRIMARY CARE | Facility: CLINIC | Age: 85
End: 2025-02-24
Payer: COMMERCIAL

## 2025-02-24 ENCOUNTER — TELEPHONE (OUTPATIENT)
Dept: PRIMARY CARE | Facility: CLINIC | Age: 85
End: 2025-02-24
Payer: COMMERCIAL

## 2025-02-24 NOTE — PROGRESS NOTES
Called nursing home, advised that yes I will sign the death certificate- this was a natural causes case. Spoke to Deborah  Patient unfortunately passed on [birth year] 23, my partner Dr Ferraro notified me patient found unresponsive, pea, cpr [phone] called       View All Conversations on this Encounter  Caroline Gilmore routed conversation to You6 hours ago (11:39 AM)     Caroline Gilmore6 hours ago (11:39 AM)       Passed away at [place] 23, are you signing death certificiate.  [facility] [phone]         Note         [facility] [phone]  Caroline Gilmore

## 2025-02-26 LAB
ATRIAL RATE: 75 BPM
P AXIS: 94 DEGREES
P OFFSET: 159 MS
P ONSET: 104 MS
PR INTERVAL: 212 MS
Q ONSET: 210 MS
QRS COUNT: 12 BEATS
QRS DURATION: 84 MS
QT INTERVAL: 392 MS
QTC CALCULATION(BAZETT): 437 MS
QTC FREDERICIA: 422 MS
R AXIS: 130 DEGREES
T AXIS: -41 DEGREES
T OFFSET: 406 MS
VENTRICULAR RATE: 75 BPM